# Patient Record
Sex: MALE | Race: WHITE | NOT HISPANIC OR LATINO | Employment: OTHER | URBAN - METROPOLITAN AREA
[De-identification: names, ages, dates, MRNs, and addresses within clinical notes are randomized per-mention and may not be internally consistent; named-entity substitution may affect disease eponyms.]

---

## 2017-01-05 ENCOUNTER — OFFICE VISIT (OUTPATIENT)
Dept: DERMATOLOGY | Facility: CLINIC | Age: 67
End: 2017-01-05

## 2017-01-05 DIAGNOSIS — B35.1 ONYCHOMYCOSIS: Primary | ICD-10-CM

## 2017-01-05 DIAGNOSIS — L73.8 SEBACEOUS HYPERPLASIA: ICD-10-CM

## 2017-01-05 ASSESSMENT — PAIN SCALES - GENERAL: PAINLEVEL: NO PAIN (0)

## 2017-01-05 NOTE — NURSING NOTE
Dermatology Rooming Note    Jefry Hatfield's goals for this visit include:   Chief Complaint   Patient presents with     Derm Problem     Patient comes to clinic today to check a spot on his face.     Ivy Gardner CMA

## 2017-01-05 NOTE — PROGRESS NOTES
[facility] Dermatology Note      Dermatology Problem List:  1. Sebaceous hyperplasia  2. Possible onychomycosis vs trauma, R 1st/2nd toenails, with likely ACD to topical tea aren oil vs triple abx    Encounter Date: Jan 5, 2017    CC:   Chief Complaint   Patient presents with     Derm Problem     Patient comes to clinic today to check a spot on his face.         History of Present Illness:  Mr. Jefry Hatfield is a 66 year old male who present for eval of raised non-tender bumps on his left cheek, present for over 1 year, not growing painful, or bleeding. He also reports thickening and yellowing of his R 1st and 2nd toenails, not improving with topical tea tree oil (rather, worsening with a new itchy rash on the treated toes x4 weeks).     Past Medical History:   Patient Active Problem List   Diagnosis     Vision changes     Senile nuclear sclerosis     Malignant myopia     Pseudophakia of both eyes - Both Eyes     Myopic degeneration     Anisometropia     Cervicalgia     Cervical radiculopathy     Past Medical History   Diagnosis Date     Posterior staphyloma      LE     Pseudophakia of left eye      Myopic degeneration      Cataract, right eye      Retinal detachment RE 1971,[id number]     BE     Anisometropia      High myopia      Past Surgical History   Procedure Laterality Date     Scleral buckle  1971     RE     Scleral buckle  1980     LE     Cataract iol, rt/lt Bilateral 11/14/12LE 7/1/14RE     BE     Phacoemulsification clear cornea with standard intraocular lens implant  7/1/2014     Procedure: PHACOEMULSIFICATION CLEAR CORNEA WITH STANDARD INTRAOCULAR LENS IMPLANT;  Surgeon: Milan Bernardo MD;  Location: [facility]     Yag capsulotomy od (right eye)  10/30/2014       Medications:  Current Outpatient Prescriptions   Medication Sig Dispense Refill     NORTRIPTYLINE HCL PO Take 125 mg by mouth daily       LORazepam (ATIVAN) 0.5 MG tablet Take 30 minutes prior toMRI, may repeat one  time as needed if not relaxed for MRI.  Do not operate a vehicle after taking this medication 2 tablet 0     CALCIUM-MAG-VIT C-VIT D PO Take by mouth 2 times daily       VITAMIN D, CHOLECALCIFEROL, PO Take by mouth daily       Omega-3 Fatty Acids (OMEGA-3 FISH OIL PO) Take by mouth daily       Saw Palmetto, Serenoa repens, (SAW PALMETTO EXTRACT PO) Take by mouth daily          No Known Allergies      Review of Systems:  General: No recent infections, fevers, chills, malaise, arthralgias, unexplained weight/appetite changes  Skin: No new/changing moles, nothing bleeding/nonhealing/painful/tender/rapidly-growing.  Lymphatic: No subcutaneous lumps/bumps.      Physical exam:  Vitals: There were no vitals taken for this visit.  GEN: Well-appearing male, no discomfort or distress, cooperative with the exam  SKIN: Focused exam of face, right foot  - L cheek: umbilicated 2mm yellow-white papules x4  -yellowing/thickening R first and 2nd toenails, no webspace maceration  - xerotic red thin crusted plaques on the dorsal first two toes  -No other lesions of concern on areas examined.     Impression/Plan:  1. Sebaceous hyperplasia   - Benign nature was discussed. No further intervention required at this time.     2. Possibly onychomycosis, R 1st and 2nd toenails. No webspace maceration  - Clipping for PAS  - if negative, will RX urea 40% cream. If positive, will discuss possible oral terbinafine      3. ACD, likely to tea tree oil vs triple abx oint  - Advise discontinuing and using a daily moisturizer    Follow-up: pending clipping result.      Discussed and examined with [facility] staff dermatologist Dr. Chiara Lux.    Arash Lakhani MD, TR  PGY-4, Dermatology      Staff Involved:  Resident(Arash Lakhani)/Staff(as above)    .I was present for key portions of the history and exam.  See resident note for pertinent history, exam, and treatment plan.  Chiara Lux MD

## 2017-01-05 NOTE — Clinical Note
1/5/2017       RE: Jefry Hatfield  [address] [address]     Dear Colleague,    Thank you for referring your patient, Jefry Hatfield, to the [facility] DERMATOLOGY at [facility]. Please see a copy of my visit note below.    [facility] Dermatology Note      Dermatology Problem List:  1. Sebaceous hyperplasia  2. Possible onychomycosis vs trauma, R 1st/2nd toenails, with likely ACD to topical tea aren oil vs triple abx    Encounter Date: Jan 5, 2017    CC:   Chief Complaint   Patient presents with     Derm Problem     Patient comes to clinic today to check a spot on his face.         History of Present Illness:  Mr. Jefry Hatfield is a 66 year old male who present for eval of raised non-tender bumps on his left cheek, present for over 1 year, not growing painful, or bleeding. He also reports thickening and yellowing of his R 1st and 2nd toenails, not improving with topical tea tree oil (rather, worsening with a new itchy rash on the treated toes x4 weeks).     Past Medical History:   Patient Active Problem List   Diagnosis     Vision changes     Senile nuclear sclerosis     Malignant myopia     Pseudophakia of both eyes - Both Eyes     Myopic degeneration     Anisometropia     Cervicalgia     Cervical radiculopathy     Past Medical History   Diagnosis Date     Posterior staphyloma      LE     Pseudophakia of left eye      Myopic degeneration      Cataract, right eye      Retinal detachment RE 1971,[id number]     BE     Anisometropia      High myopia      Past Surgical History   Procedure Laterality Date     Scleral buckle  1971     RE     Scleral buckle  1980     LE     Cataract iol, rt/lt Bilateral 11/14/12LE 7/1/14RE     BE     Phacoemulsification clear cornea with standard intraocular lens implant  7/1/2014     Procedure: PHACOEMULSIFICATION CLEAR CORNEA WITH STANDARD INTRAOCULAR LENS IMPLANT;  Surgeon: Milan Bernardo,  MD;  Location:  [facility]     Yag capsulotomy od (right eye)  10/30/2014       Medications:  Current Outpatient Prescriptions   Medication Sig Dispense Refill     NORTRIPTYLINE HCL PO Take 125 mg by mouth daily       LORazepam (ATIVAN) 0.5 MG tablet Take 30 minutes prior toMRI, may repeat one time as needed if not relaxed for MRI.  Do not operate a vehicle after taking this medication 2 tablet 0     CALCIUM-MAG-VIT C-VIT D PO Take by mouth 2 times daily       VITAMIN D, CHOLECALCIFEROL, PO Take by mouth daily       Omega-3 Fatty Acids (OMEGA-3 FISH OIL PO) Take by mouth daily       Saw Palmetto, Serenoa repens, (SAW PALMETTO EXTRACT PO) Take by mouth daily          No Known Allergies      Review of Systems:  General: No recent infections, fevers, chills, malaise, arthralgias, unexplained weight/appetite changes  Skin: No new/changing moles, nothing bleeding/nonhealing/painful/tender/rapidly-growing.  Lymphatic: No subcutaneous lumps/bumps.      Physical exam:  Vitals: There were no vitals taken for this visit.  GEN: Well-appearing male, no discomfort or distress, cooperative with the exam  SKIN: Focused exam of face, right foot  - L cheek: umbilicated 2mm yellow-white papules x4  -yellowing/thickening R first and 2nd toenails, no webspace maceration  - xerotic red thin crusted plaques on the dorsal first two toes  -No other lesions of concern on areas examined.     Impression/Plan:  1. Sebaceous hyperplasia   - Benign nature was discussed. No further intervention required at this time.     2. Possibly onychomycosis, R 1st and 2nd toenails. No webspace maceration  - Clipping for PAS  - if negative, will RX urea 40% cream. If positive, will discuss possible oral terbinafine      3. ACD, likely to tea tree oil vs triple abx oint  - Advise discontinuing and using a daily moisturizer    Follow-up: pending clipping result.      Discussed and examined with [facility] staff dermatologist Dr. Chiara Lux.    Arash Lakhani MD,  TR  PGY-4, Dermatology      Staff Involved:  Resident(Arash Lakhani)/Staff(as above)    .I was present for key portions of the history and exam.  See resident note for pertinent history, exam, and treatment plan.  Chiara Lux MD        Again, thank you for allowing me to participate in the care of your patient.      Sincerely,    Arash Lakhani MD

## 2017-01-05 NOTE — PATIENT INSTRUCTIONS
Go to the lab for a blood draw    If appropriate, start terbinafine 250 mg by mouth daily for 6 weeks.    Go to the lab for a blood draw 6 weeks after starting the medication; if normal, we will prescribe the last 6 weeks.    Side effects: taste changes, GI upset. Stop the medication and call the clinic if you develop severe fatigue or a new rash.    Follow-up: 6 months

## 2017-01-09 LAB — COPATH REPORT: NORMAL

## 2017-01-16 ENCOUNTER — TELEPHONE (OUTPATIENT)
Dept: DERMATOLOGY | Facility: CLINIC | Age: 67
End: 2017-01-16

## 2017-01-16 DIAGNOSIS — L60.3 NAIL DYSTROPHY: Primary | ICD-10-CM

## 2017-01-16 RX ORDER — UREA 40 %
CREAM (GRAM) TOPICAL
Qty: 28 G | Refills: 5 | Status: SHIPPED | OUTPATIENT
Start: 2017-01-16 | End: 2018-03-27

## 2017-01-16 NOTE — TELEPHONE ENCOUNTER
Reached Mr. Hatfield to discuss negative nail PAS. Will RX urea 40% cream nightly under bandage occlusion to help with file/nipper debridement. Will order 3-mo recall for reassessment.    Arash Lakhani MD, TR  PGY-4, Dermatology

## 2017-05-04 ENCOUNTER — OFFICE VISIT (OUTPATIENT)
Dept: OPHTHALMOLOGY | Facility: CLINIC | Age: 67
End: 2017-05-04
Attending: OPHTHALMOLOGY
Payer: COMMERCIAL

## 2017-05-04 DIAGNOSIS — H44.23 MYOPIC DEGENERATION, BILATERAL: Primary | ICD-10-CM

## 2017-05-04 PROCEDURE — 92134 CPTRZ OPH DX IMG PST SGM RTA: CPT | Mod: ZF | Performed by: OPHTHALMOLOGY

## 2017-05-04 PROCEDURE — 99212 OFFICE O/P EST SF 10 MIN: CPT | Mod: ZF

## 2017-05-04 ASSESSMENT — SLIT LAMP EXAM - LIDS
COMMENTS: NORMAL
COMMENTS: NORMAL

## 2017-05-04 ASSESSMENT — VISUAL ACUITY
OD_PH_CC: 20/125
OS_PH_CC: 20/100+1
OS_CC: 20/200
OD_CC: 20/150
CORRECTION_TYPE: CONTACTS
METHOD: SNELLEN - LINEAR

## 2017-05-04 ASSESSMENT — EXTERNAL EXAM - RIGHT EYE: OD_EXAM: NORMAL

## 2017-05-04 ASSESSMENT — CONF VISUAL FIELD
OD_INFERIOR_NASAL_RESTRICTION: 3
OS_INFERIOR_NASAL_RESTRICTION: 3
OD_INFERIOR_TEMPORAL_RESTRICTION: 3
OS_INFERIOR_TEMPORAL_RESTRICTION: 1
OD_SUPERIOR_TEMPORAL_RESTRICTION: 3
OS_SUPERIOR_TEMPORAL_RESTRICTION: 3

## 2017-05-04 ASSESSMENT — TONOMETRY
IOP_METHOD: TONOPEN
OS_IOP_MMHG: 16
OD_IOP_MMHG: 16

## 2017-05-04 ASSESSMENT — EXTERNAL EXAM - LEFT EYE: OS_EXAM: NORMAL

## 2017-05-04 NOTE — NURSING NOTE
Chief Complaints and History of Present Illnesses   Patient presents with     Follow Up For     Myopic degeneration, bilateral - Both Eyes stable     HPI    Affected eye(s):  Both   Symptoms:        Duration:  1 year   Frequency:  Constant       Do you have eye pain now?:  No      Comments:  Pt. States that he is doing well.  No change in VA BE.  No c/o comfort BE.  No flashes BE.  Occasional floaters BE.  Smiley CHURCH 8:19 AM May 4, 2017

## 2017-05-04 NOTE — MR AVS SNAPSHOT
After Visit Summary   5/4/2017    Jefry Hatfield    MRN: [id number]           Patient Information     Date Of Birth          1950        Visit Information        Provider Department      5/4/2017 8:15 AM Lisa Chang MD [facility]        Today's Diagnoses     Myopic degeneration, bilateral    -  1       Follow-ups after your visit        Your next 10 appointments already scheduled     May 16, 2017  8:00 AM CDT   New Patient Visit with Gabriela Saenz OD   Eye Clinic ([facility])    [facility] 9th Fl  Clinic [address]  [address] [address]   [phone]              Who to contact     Please call your clinic at [phone] to:    Ask questions about your health    Make or cancel appointments    Discuss your medicines    Learn about your test results    Speak to your doctor   If you have compliments or concerns about an experience at your clinic, or if you wish to file a complaint, please contact [facility] Patient Relations at [phone] or email us at [email or web address]         Additional Information About Your Visit        MyChart Information     [employer] gives you secure access to your electronic health record. If you see a primary care provider, you can also send messages to your care team and make appointments. If you have questions, please call your primary care clinic.  If you do not have a primary care provider, please call [phone] and they will assist you.      [employer] is an electronic gateway that provides easy, online access to your medical records. With [employer], you can request a clinic appointment, read your test results, renew a prescription or communicate with your care team.     To access your existing account, please contact your [facility] or call [phone] for assistance.        Care EveryWhere ID     This is your Care EveryWhere ID.  This could be used by other organizations to access your [place] medical records  [id number]         Blood Pressure from Last 3 Encounters:   08/18/15 131/89   07/01/14 109/82   02/20/13 127/90    Weight from Last 3 Encounters:   08/18/15 80.7 kg (178 lb)   08/06/15 80.7 kg (178 lb)   07/30/15 80.7 kg (178 lb)              We Performed the Following     OCT Retina Spectralis OU (both eyes)        Primary Care Provider    None       No address on file        Thank you!     Thank you for choosing EYE CLINIC  for your care. Our goal is always to provide you with excellent care. Hearing back from our patients is one way we can continue to improve our services. Please take a few minutes to complete the written survey that you may receive in the mail after your visit with us. Thank you!             Your Updated Medication List - Protect others around you: Learn how to safely use, store and throw away your medicines at www.disposemymeds.org.          This list is accurate as of: 5/4/17 10:49 AM.  Always use your most recent med list.                   Brand Name Dispense Instructions for use    CALCIUM-MAG-VIT C-VIT D PO      Take by mouth 2 times daily       LORazepam 0.5 MG tablet    ATIVAN    2 tablet    Take 30 minutes prior toMRI, may repeat one time as needed if not relaxed for MRI.  Do not operate a vehicle after taking this medication       NORTRIPTYLINE HCL PO      Take 125 mg by mouth daily       OMEGA-3 FISH OIL PO      Take by mouth daily       SAW PALMETTO EXTRACT PO      Take by mouth daily       Urea 40 % Crea     28 g    Apply a thin layer to affected toenails under bandage occlusion nightly as needed.       VITAMIN D (CHOLECALCIFEROL) PO      Take by mouth daily

## 2017-05-04 NOTE — PROGRESS NOTES
CC: follow up myopic degeneration, posterior staphylomas  HPI: Jefry Hatfield is a  66 year old year-old patient with history of myopic degeneration both eyes., He has been followed in the past by Dr. Gopi Anne. Patient here for fundus evaluation. Reports progressive decreased vision both eyes, no new flashes and floaters     OCULAR HISTORY  Retinal detachment  With scleral buckle both eyes  right eye 1971, [id number]  Cataract extraction/IOL right eye 7-1-14  Cataract extraction/IOL left eye 11-14-12  S/p yag pc right eye 10-30-14    Retinal Imaging:  OCT  5-4-17  RE: posterior staphyloma, retina atrophic changes. Thin choroid  LE: posterior staphyloma, retina atrophic changes. Thin choroid and trace Epiretinal membrane     Assessment & Plan:    1.  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 pseudophakia both eyes - observe    Plan:  - observe  Discussed with patient the use of low vision aids including orcam camera  Consider follow up with low vision - Dr. Kate Herrera  Follow up in 12 months, sooner as needed    ~~~~~~~~~~~~~~~~~~~~~~~~~~~~~~~~~~   Complete documentation of historical and exam elements from today's encounter can be found in the full encounter summary report (not reduplicated in this progress note).  I personally obtained the chief complaint(s) and history of present illness.  I confirmed and edited as necessary the review of systems, past medical/surgical history, family history, social history, and examination findings as documented by others; and I examined the patient myself.  I personally reviewed the relevant tests, images, and reports as documented above.  I formulated and edited as  necessary the assessment and plan and discussed the findings and management plan with the patient and family    Lisa Chang MD  .  Retina Service   Department of Ophthalmology and Visual Neurosciences   [facility]  Phone: [phone]   Fax: [phone]

## 2017-05-16 ENCOUNTER — OFFICE VISIT (OUTPATIENT)
Dept: OPTOMETRY | Facility: CLINIC | Age: 67
End: 2017-05-16

## 2017-05-16 DIAGNOSIS — H44.23 DEGENERATIVE MYOPIA, BILATERAL: Primary | ICD-10-CM

## 2017-05-16 DIAGNOSIS — Z96.1 PSEUDOPHAKIA: ICD-10-CM

## 2017-05-16 DIAGNOSIS — H52.203 ASTIGMATISM, BILATERAL: ICD-10-CM

## 2017-05-16 ASSESSMENT — REFRACTION_CURRENTRX
OS_DIAMETER: 14.9
OD_SPHERE: -12.00
OS_SPHERE: -1.50
OS_BASECURVE: 9.0
OD_BRAND: ONEFIT 2.0
OD_DIAMETER: 14.2
OD_DIAMETER: 14.9
OD_BASECURVE: 7.5
OS_BRAND: ONEFIT 2.0
OS_SPHERE: -3.50
OS_DIAMETER: 14.7
OD_BASECURVE: 9.0
OS_BASECURVE: 7.4
OD_SPHERE: -3.00

## 2017-05-16 ASSESSMENT — VISUAL ACUITY
OS_CC: 20/200
METHOD: SNELLEN - LINEAR
OD_CC: 20/200
CORRECTION_TYPE: CONTACTS

## 2017-05-16 ASSESSMENT — KERATOMETRY
OS_AXISANGLE_DEGREES: 49
OD_K1POWER_DIOPTERS: 43.10
OS_K2POWER_DIOPTERS: 43.89
OD_AXISANGLE_DEGREES: 116
METHOD_AUTO_MANUAL: TOPOGRAPHY
OD_AXISANGLE2_DEGREES: 26
OS_K1POWER_DIOPTERS: 42.94
OS_AXISANGLE2_DEGREES: 139
OD_K2POWER_DIOPTERS: 44.06

## 2017-05-16 ASSESSMENT — SLIT LAMP EXAM - LIDS
COMMENTS: NORMAL
COMMENTS: NORMAL

## 2017-05-16 ASSESSMENT — EXTERNAL EXAM - LEFT EYE: OS_EXAM: NORMAL

## 2017-05-16 ASSESSMENT — EXTERNAL EXAM - RIGHT EYE: OD_EXAM: NORMAL

## 2017-05-16 NOTE — PROGRESS NOTES
A/P  1.) Degenerative Myopia/Anisometropia OU  -Currently in soft lenses, interested in improving acuity  -Does note subjective increase in acuity with scleral lens trial today  -Given motivation and current level of fit it is reasonable to pursue scleral lens fitting  -High anisometropia s/p CE/IOL  -Reviewed findings with pt, he would like to attempt     RTC 2 weeks for CL dispense    Contact Lens Billing  V-Code:  - GP scleral  Final Contact Lens Rx      Brand Base Curve Diameter Sphere Lens Addl. Specs   Right Onefit 2.0 (TO BE ORDERED) 7.7 15.2 -13.00 x-tra limbal clearance, std edge Optimum Extra clear   Left Onefit 2.0 (TO BE ORDERED) 7.7 15.2 -3.50 xtra limbal clearance, std edge Optimum Extra blue            # of units: 2  Price per Unit: $250    This patient requires contact lenses that are medically necessary for either improvement in vision over spectacles, support of the ocular surface, or other therapeutic benefit. These are not cosmetic contact lenses.     Encounter Diagnoses   Name Primary?     Astigmatism, bilateral      Degenerative myopia, bilateral Yes     Pseudophakia

## 2017-05-16 NOTE — MR AVS SNAPSHOT
After Visit Summary   5/16/2017    Jefry Hatfield    MRN: [id number]           Patient Information     Date Of Birth          1950        Visit Information        Provider Department      5/16/2017 8:00 AM Gabriela Saenz, [facility]        Today's Diagnoses     Degenerative myopia, bilateral    -  1    Astigmatism, bilateral        Pseudophakia           Follow-ups after your visit        Who to contact     Please call your clinic at [phone] to:    Ask questions about your health    Make or cancel appointments    Discuss your medicines    Learn about your test results    Speak to your doctor   If you have compliments or concerns about an experience at your clinic, or if you wish to file a complaint, please contact [facility] Patient Relations at [phone] or email us at [email or web address]         Additional Information About Your Visit        MyChart Information     [employer] gives you secure access to your electronic health record. If you see a primary care provider, you can also send messages to your care team and make appointments. If you have questions, please call your primary care clinic.  If you do not have a primary care provider, please call [phone] and they will assist you.      "[employer] is an electronic gateway that provides easy, online access to your medical records. With "[employer] you can request a clinic appointment, read your test results, renew a prescription or communicate with your care team.     To access your existing account, please contact your [facility] or call [phone] for assistance.        Care EveryWhere ID     This is your Care EveryWhere ID. This could be used by other organizations to access your [place] medical records  [id number]         Blood Pressure from Last 3 Encounters:   08/18/15 131/89   07/01/14 109/82   02/20/13 127/90    Weight from Last 3  Encounters:   08/18/15 80.7 kg (178 lb)   08/06/15 80.7 kg (178 lb)   07/30/15 80.7 kg (178 lb)              We Performed the Following     Corneal Topography OU (both eyes)        Primary Care Provider    None       No address on file        Thank you!     Thank you for choosing EYE CLINIC  for your care. Our goal is always to provide you with excellent care. Hearing back from our patients is one way we can continue to improve our services. Please take a few minutes to complete the written survey that you may receive in the mail after your visit with us. Thank you!             Your Updated Medication List - Protect others around you: Learn how to safely use, store and throw away your medicines at www.disposemymeds.org.          This list is accurate as of: 5/16/17  4:09 PM.  Always use your most recent med list.                   Brand Name Dispense Instructions for use    CALCIUM-MAG-VIT C-VIT D PO      Take by mouth 2 times daily       LORazepam 0.5 MG tablet    ATIVAN    2 tablet    Take 30 minutes prior toMRI, may repeat one time as needed if not relaxed for MRI.  Do not operate a vehicle after taking this medication       NORTRIPTYLINE HCL PO      Take 125 mg by mouth daily       OMEGA-3 FISH OIL PO      Take by mouth daily       SAW PALMETTO EXTRACT PO      Take by mouth daily       Urea 40 % Crea     28 g    Apply a thin layer to affected toenails under bandage occlusion nightly as needed.       VITAMIN D (CHOLECALCIFEROL) PO      Take by mouth daily

## 2017-05-24 ENCOUNTER — OFFICE VISIT (OUTPATIENT)
Dept: OPTOMETRY | Facility: CLINIC | Age: 67
End: 2017-05-24

## 2017-05-24 DIAGNOSIS — H52.203 ASTIGMATISM, BILATERAL: ICD-10-CM

## 2017-05-24 DIAGNOSIS — H44.23 DEGENERATIVE MYOPIA, BILATERAL: Primary | ICD-10-CM

## 2017-05-24 DIAGNOSIS — Z96.1 PSEUDOPHAKIA: ICD-10-CM

## 2017-05-24 ASSESSMENT — REFRACTION_CURRENTRX
OD_DIAMETER: 15.2
OS_ADDL_SPECS: OPTIMUM EXTRA BLUE
OD_BASECURVE: 7.7
OD_BRAND: ONEFIT 2.0
OS_BRAND: ONEFIT 2.0
OS_SPHERE: -3.50
OD_ADDL_SPECS: OPTIMUM EXTRA CLEAR
OS_BASECURVE: 7.7
OS_DIAMETER: 15.2
OD_SPHERE: -13.00

## 2017-05-24 ASSESSMENT — VISUAL ACUITY
OD_CC: 20/150-
OS_CC: 20/150-
METHOD: SNELLEN - LINEAR
CORRECTION_TYPE: CONTACTS

## 2017-05-24 ASSESSMENT — SLIT LAMP EXAM - LIDS
COMMENTS: NORMAL
COMMENTS: NORMAL

## 2017-05-24 ASSESSMENT — EXTERNAL EXAM - LEFT EYE: OS_EXAM: NORMAL

## 2017-05-24 ASSESSMENT — EXTERNAL EXAM - RIGHT EYE: OD_EXAM: NORMAL

## 2017-05-24 NOTE — MR AVS SNAPSHOT
After Visit Summary   5/24/2017    Jefry Hatfield    MRN: [id number]           Patient Information     Date Of Birth          1950        Visit Information        Provider Department      5/24/2017 3:00 PM Gabriela Saenz, [facility]        Today's Diagnoses     Degenerative myopia, bilateral    -  1    Pseudophakia        Astigmatism, bilateral           Follow-ups after your visit        Who to contact     Please call your clinic at [phone] to:    Ask questions about your health    Make or cancel appointments    Discuss your medicines    Learn about your test results    Speak to your doctor   If you have compliments or concerns about an experience at your clinic, or if you wish to file a complaint, please contact [facility] Patient Relations at [phone] or email us at [email or web address]         Additional Information About Your Visit        MyChart Information     [employer] gives you secure access to your electronic health record. If you see a primary care provider, you can also send messages to your care team and make appointments. If you have questions, please call your primary care clinic.  If you do not have a primary care provider, please call [phone] and they will assist you.      [employer] is an electronic gateway that provides easy, online access to your medical records. With [employer], you can request a clinic appointment, read your test results, renew a prescription or communicate with your care team.     To access your existing account, please contact your [facility] or call [phone] for assistance.        Care EveryWhere ID     This is your Care EveryWhere ID. This could be used by other organizations to access your [place] medical records  [id number]         Blood Pressure from Last 3 Encounters:   08/18/15 131/89   07/01/14 109/82   02/20/13 127/90    Weight from Last 3  Encounters:   08/18/15 80.7 kg (178 lb)   08/06/15 80.7 kg (178 lb)   07/30/15 80.7 kg (178 lb)              Today, you had the following     No orders found for display       Primary Care Provider    None       No address on file        Thank you!     Thank you for choosing EYE CLINIC  for your care. Our goal is always to provide you with excellent care. Hearing back from our patients is one way we can continue to improve our services. Please take a few minutes to complete the written survey that you may receive in the mail after your visit with us. Thank you!             Your Updated Medication List - Protect others around you: Learn how to safely use, store and throw away your medicines at www.disposemymeds.org.          This list is accurate as of: 5/24/17 11:59 PM.  Always use your most recent med list.                   Brand Name Dispense Instructions for use    CALCIUM-MAG-VIT C-VIT D PO      Take by mouth 2 times daily       LORazepam 0.5 MG tablet    ATIVAN    2 tablet    Take 30 minutes prior toMRI, may repeat one time as needed if not relaxed for MRI.  Do not operate a vehicle after taking this medication       NORTRIPTYLINE HCL PO      Take 125 mg by mouth daily       OMEGA-3 FISH OIL PO      Take by mouth daily       SAW PALMETTO EXTRACT PO      Take by mouth daily       Urea 40 % Crea     28 g    Apply a thin layer to affected toenails under bandage occlusion nightly as needed.       VITAMIN D (CHOLECALCIFEROL) PO      Take by mouth daily

## 2017-05-25 NOTE — PROGRESS NOTES
A/P  1.) Degenerative Myopia/Anisometropia OU  -Currently in soft lenses, interested in improving acuity  -Good comfort/fit with scleral lenses today  -Successful I&R, reviewed CL care and hygiene with pt  -Dispensed lenses, pt will try for several week to compare performance and acuity over soft lenses    RTC 2-3 weeks for f/u

## 2017-08-30 ENCOUNTER — OFFICE VISIT (OUTPATIENT)
Dept: OPTOMETRY | Facility: CLINIC | Age: 67
End: 2017-08-30

## 2017-08-30 DIAGNOSIS — H44.23 DEGENERATIVE MYOPIA, BILATERAL: Primary | ICD-10-CM

## 2017-08-30 DIAGNOSIS — Z96.1 PSEUDOPHAKIA: ICD-10-CM

## 2017-08-30 ASSESSMENT — REFRACTION_CURRENTRX
OS_BASECURVE: 7.7
OD_BASECURVE: 7.7
OD_BRAND: ONEFIT 2.0
OD_SPHERE: -13.00
OS_BRAND: ONEFIT 2.0
OS_DIAMETER: 15.2
OS_ADDL_SPECS: OPTIMUM EXTRA BLUE
OD_DIAMETER: 15.2
OS_SPHERE: -3.50
OD_ADDL_SPECS: OPTIMUM EXTRA CLEAR

## 2017-08-30 ASSESSMENT — SLIT LAMP EXAM - LIDS
COMMENTS: NORMAL
COMMENTS: NORMAL

## 2017-08-30 ASSESSMENT — VISUAL ACUITY
METHOD: SNELLEN - LINEAR
OD_CC: 20/200
CORRECTION_TYPE: CONTACTS
OS_CC: 20/200

## 2017-08-30 ASSESSMENT — EXTERNAL EXAM - RIGHT EYE: OD_EXAM: NORMAL

## 2017-08-30 ASSESSMENT — EXTERNAL EXAM - LEFT EYE: OS_EXAM: NORMAL

## 2017-08-30 NOTE — MR AVS SNAPSHOT
After Visit Summary   8/30/2017    Jefry Hatfield    MRN: [id number]           Patient Information     Date Of Birth          1950        Visit Information        Provider Department      8/30/2017 3:00 PM Gabriela Saenz, [facility]        Today's Diagnoses     Degenerative myopia, bilateral    -  1    Pseudophakia           Follow-ups after your visit        Who to contact     Please call your clinic at [phone] to:    Ask questions about your health    Make or cancel appointments    Discuss your medicines    Learn about your test results    Speak to your doctor   If you have compliments or concerns about an experience at your clinic, or if you wish to file a complaint, please contact [facility] Patient Relations at [phone] or email us at [email or web address]         Additional Information About Your Visit        MyChart Information     Lumiert gives you secure access to your electronic health record. If you see a primary care provider, you can also send messages to your care team and make appointments. If you have questions, please call your primary care clinic.  If you do not have a primary care provider, please call [phone] and they will assist you.      gBox is an electronic gateway that provides easy, online access to your medical records. With gBox, you can request a clinic appointment, read your test results, renew a prescription or communicate with your care team.     To access your existing account, please contact your [facility] or call [phone] for assistance.        Care EveryWhere ID     This is your Care EveryWhere ID. This could be used by other organizations to access your [place] medical records  [id number]         Blood Pressure from Last 3 Encounters:   08/18/15 131/89   07/01/14 109/82   02/20/13 127/90    Weight from Last 3 Encounters:   08/18/15 80.7 kg (178  lb)   08/06/15 80.7 kg (178 lb)   07/30/15 80.7 kg (178 lb)              Today, you had the following     No orders found for display       Primary Care Provider    None       No address on file        Equal Access to Services     UET BOSTON : Megan arminda quiroz dhaval Desai, waaxda luqadaha, qaybta kaalmada adefrancisca, danika henrytila imani. So [facility] [phone].    ATENCIÓN: Si habla español, tiene a payan disposición servicios gratuitos de asistencia lingüística. Llame al [phone].    We comply with applicable federal civil rights laws and [place] laws. We do not discriminate on the basis of race, color, national origin, age, disability sex, sexual orientation or gender identity.            Thank you!     Thank you for choosing EYE CLINIC  for your care. Our goal is always to provide you with excellent care. Hearing back from our patients is one way we can continue to improve our services. Please take a few minutes to complete the written survey that you may receive in the mail after your visit with us. Thank you!             Your Updated Medication List - Protect others around you: Learn how to safely use, store and throw away your medicines at www.disposemymeds.org.          This list is accurate as of: 8/30/17 11:59 PM.  Always use your most recent med list.                   Brand Name Dispense Instructions for use Diagnosis    CALCIUM-MAG-VIT C-VIT D PO      Take by mouth 2 times daily        LORazepam 0.5 MG tablet    ATIVAN    2 tablet    Take 30 minutes prior toMRI, may repeat one time as needed if not relaxed for MRI.  Do not operate a vehicle after taking this medication    Cervicalgia       NORTRIPTYLINE HCL PO      Take 125 mg by mouth daily        OMEGA-3 FISH OIL PO      Take by mouth daily        SAW PALMETTO EXTRACT PO      Take by mouth daily        Urea 40 % Crea     28 g    Apply a thin layer to affected toenails under bandage occlusion nightly as needed.    Nail dystrophy        VITAMIN D (CHOLECALCIFEROL) PO      Take by mouth daily

## 2017-09-01 NOTE — PROGRESS NOTES
A/P  1.) Degenerative Myopia/Anisometropia OU  -Overall doing well in part-time scleral lens wear, alternates with soft lenses  -Did not return for f/u on initial lenses  -Several episode of lid swelling after wearing scleral lenses (happens quickly after lens instillation)  -Does not sound like tight lens syndrome, though the peripheral curves could be slightly looser  -Rec switching totally to PF  (Clearcare) for rigid lenses, use addipak instead of reusable Purilens bottle  -If continued episodes, I would recommend ordering new lenses with looser peripheries    Monitor as needed for worsening symptoms

## 2018-01-22 ENCOUNTER — OFFICE VISIT (OUTPATIENT)
Dept: OPTOMETRY | Facility: CLINIC | Age: 68
End: 2018-01-22
Payer: COMMERCIAL

## 2018-01-22 DIAGNOSIS — H44.23 DEGENERATIVE MYOPIA, BILATERAL: Primary | ICD-10-CM

## 2018-01-22 DIAGNOSIS — H52.31 ANISOMETROPIA: ICD-10-CM

## 2018-01-22 DIAGNOSIS — Z96.1 PSEUDOPHAKIA: ICD-10-CM

## 2018-01-22 ASSESSMENT — REFRACTION_CURRENTRX
OS_DIAMETER: 14.7
OS_SPHERE: -1.50
OS_DIAMETER: 14.1
OD_BASECURVE: 9.0
OS_BASECURVE: 9.0
OD_BASECURVE: 8.5
OD_DIAMETER: 14.2
OS_BASECURVE: 8.5
OS_BRAND: DAILIES TOTAL 1
OD_SPHERE: -11.00
OD_DIAMETER: 14.1
OD_BRAND: DAILIES TOTAL 1
OS_SPHERE: -1.50
OD_SPHERE: -12.00

## 2018-01-22 ASSESSMENT — VISUAL ACUITY
OS_CC: 20/150
CORRECTION_TYPE: CONTACTS
OD_CC: 20/300
METHOD: SNELLEN - LINEAR

## 2018-01-22 ASSESSMENT — EXTERNAL EXAM - RIGHT EYE: OD_EXAM: NORMAL

## 2018-01-22 ASSESSMENT — EXTERNAL EXAM - LEFT EYE: OS_EXAM: NORMAL

## 2018-01-22 ASSESSMENT — SLIT LAMP EXAM - LIDS
COMMENTS: NORMAL
COMMENTS: NORMAL

## 2018-01-22 NOTE — MR AVS SNAPSHOT
After Visit Summary   1/22/2018    Jefry Hatfield    MRN: [id number]           Patient Information     Date Of Birth          1950        Visit Information        Provider Department      1/22/2018 8:30 AM Gabriela Saenz, [facility]        Today's Diagnoses     Degenerative myopia, bilateral    -  1    Pseudophakia        Anisometropia           Follow-ups after your visit        Who to contact     Please call your clinic at [phone] to:    Ask questions about your health    Make or cancel appointments    Discuss your medicines    Learn about your test results    Speak to your doctor   If you have compliments or concerns about an experience at your clinic, or if you wish to file a complaint, please contact [facility] Patient Relations at [phone] or email us at [email or web address]         Additional Information About Your Visit        MyChart Information     Ovulinet gives you secure access to your electronic health record. If you see a primary care provider, you can also send messages to your care team and make appointments. If you have questions, please call your primary care clinic.  If you do not have a primary care provider, please call [phone] and they will assist you.      Sparkcloud is an electronic gateway that provides easy, online access to your medical records. With Sparkcloud, you can request a clinic appointment, read your test results, renew a prescription or communicate with your care team.     To access your existing account, please contact your [facility] or call [phone] for assistance.        Care EveryWhere ID     This is your Care EveryWhere ID. This could be used by other organizations to access your [place] medical records  [id number]         Blood Pressure from Last 3 Encounters:   08/18/15 131/89   07/01/14 109/82   02/20/13 127/90    Weight from Last 3 Encounters:    08/18/15 80.7 kg (178 lb)   08/06/15 80.7 kg (178 lb)   07/30/15 80.7 kg (178 lb)              Today, you had the following     No orders found for display       Primary Care Provider    None Specified       No primary provider on file.        Equal Access to Services     TREYTOBIAS LUDY : Hadii aad ku hadangelitokerri Sokristiali, waaxda luqadaha, qaybta kaalmada keelyvalentinoclari, danika hernandeschiloviolette igleisas . So [facility] [phone].    ATENCIÓN: Si habla español, tiene a payan disposición servicios gratuitos de asistencia lingüística. Llame al [phone].    We comply with applicable federal civil rights laws and [place] laws. We do not discriminate on the basis of race, color, national origin, age, disability, sex, sexual orientation, or gender identity.            Thank you!     Thank you for choosing [facility]  for your care. Our goal is always to provide you with excellent care. Hearing back from our patients is one way we can continue to improve our services. Please take a few minutes to complete the written survey that you may receive in the mail after your visit with us. Thank you!             Your Updated Medication List - Protect others around you: Learn how to safely use, store and throw away your medicines at www.disposemymeds.org.          This list is accurate as of: 1/22/18 11:59 PM.  Always use your most recent med list.                   Brand Name Dispense Instructions for use Diagnosis    CALCIUM-MAG-VIT C-VIT D PO      Take by mouth 2 times daily        LORazepam 0.5 MG tablet    ATIVAN    2 tablet    Take 30 minutes prior toMRI, may repeat one time as needed if not relaxed for MRI.  Do not operate a vehicle after taking this medication    Cervicalgia       NORTRIPTYLINE HCL PO      Take 125 mg by mouth daily        OMEGA-3 FISH OIL PO      Take by mouth daily        SAW PALMETTO EXTRACT PO      Take by mouth daily        Urea 40 % Crea     28 g    Apply a thin layer to affected toenails under bandage  occlusion nightly as needed.    Nail dystrophy       VITAMIN D (CHOLECALCIFEROL) PO      Take by mouth daily

## 2018-01-22 NOTE — PROGRESS NOTES
A/P  1.) Degenerative Myopia/Anisometropia OU  -Currently wearing [place] soft lenses on planned replacement schedule  -Rec switching to higher Dk, more frequent replacement given peripheral corneal vascularization  -Pt prefers to stay in -12.00 right eye  -Okay to try DT1, but will order Continentals as well    Contact Lens Billing  V-Code:  - Soft spherical  Final Contact Lens Rx      Brand Base Curve Diameter Sphere   Right [place] Clear 9.0 14.2 -12.00   Left [place] Clear 9.0 14.7 -1.50            # of units: 2  Price per Unit: $45    This patient requires contact lenses that are medically necessary for either improvement in vision over spectacles, support of the ocular surface, or other therapeutic benefit. These are not cosmetic contact lenses.     Encounter Diagnoses   Name Primary?     Degenerative myopia, bilateral Yes     Pseudophakia      Anisometropia

## 2018-02-06 ENCOUNTER — OFFICE VISIT (OUTPATIENT)
Dept: OPTOMETRY | Facility: CLINIC | Age: 68
End: 2018-02-06

## 2018-02-06 DIAGNOSIS — Z96.1 PSEUDOPHAKIA: ICD-10-CM

## 2018-02-06 DIAGNOSIS — H44.23 DEGENERATIVE MYOPIA, BILATERAL: Primary | ICD-10-CM

## 2018-02-06 DIAGNOSIS — H52.31 ANISOMETROPIA: ICD-10-CM

## 2018-02-06 NOTE — MR AVS SNAPSHOT
After Visit Summary   2/6/2018    Jefry Hatfield    MRN: [id number]           Patient Information     Date Of Birth          1950        Visit Information        Provider Department      2/6/2018 8:00 AM Gabriela Saenz, [facility]        Today's Diagnoses     Degenerative myopia, bilateral    -  1    Pseudophakia        Anisometropia           Follow-ups after your visit        Who to contact     Please call your clinic at [phone] to:    Ask questions about your health    Make or cancel appointments    Discuss your medicines    Learn about your test results    Speak to your doctor   If you have compliments or concerns about an experience at your clinic, or if you wish to file a complaint, please contact [facility] Patient Relations at [phone] or email us at [email or web address]         Additional Information About Your Visit        MyChart Information     Kumu Networkst gives you secure access to your electronic health record. If you see a primary care provider, you can also send messages to your care team and make appointments. If you have questions, please call your primary care clinic.  If you do not have a primary care provider, please call [phone] and they will assist you.      RunMyProcess is an electronic gateway that provides easy, online access to your medical records. With RunMyProcess, you can request a clinic appointment, read your test results, renew a prescription or communicate with your care team.     To access your existing account, please contact your [facility] or call [phone] for assistance.        Care EveryWhere ID     This is your Care EveryWhere ID. This could be used by other organizations to access your [place] medical records  [id number]         Blood Pressure from Last 3 Encounters:   08/18/15 131/89   07/01/14 109/82   02/20/13 127/90    Weight from Last 3 Encounters:    08/18/15 80.7 kg (178 lb)   08/06/15 80.7 kg (178 lb)   07/30/15 80.7 kg (178 lb)              Today, you had the following     No orders found for display       Primary Care Provider    None Specified       No primary provider on file.        Equal Access to Services     TREYTOBIAS LUDY : Hadii aad ku hadangelitokerri Sokristiali, waaxda luqadaha, qaybta kaalmada keelyvalentinoclari, danika hernandeschiloviolette iglesias . So [facility] [phone].    ATENCIÓN: Si habla español, tiene a payan disposición servicios gratuitos de asistencia lingüística. Llame al [phone].    We comply with applicable federal civil rights laws and [place] laws. We do not discriminate on the basis of race, color, national origin, age, disability, sex, sexual orientation, or gender identity.            Thank you!     Thank you for choosing [facility]  for your care. Our goal is always to provide you with excellent care. Hearing back from our patients is one way we can continue to improve our services. Please take a few minutes to complete the written survey that you may receive in the mail after your visit with us. Thank you!             Your Updated Medication List - Protect others around you: Learn how to safely use, store and throw away your medicines at www.disposemymeds.org.          This list is accurate as of 2/6/18  3:14 PM.  Always use your most recent med list.                   Brand Name Dispense Instructions for use Diagnosis    CALCIUM-MAG-VIT C-VIT D PO      Take by mouth 2 times daily        LORazepam 0.5 MG tablet    ATIVAN    2 tablet    Take 30 minutes prior toMRI, may repeat one time as needed if not relaxed for MRI.  Do not operate a vehicle after taking this medication    Cervicalgia       NORTRIPTYLINE HCL PO      Take 125 mg by mouth daily        OMEGA-3 FISH OIL PO      Take by mouth daily        SAW PALMETTO EXTRACT PO      Take by mouth daily        Urea 40 % Crea     28 g    Apply a thin layer to affected toenails under bandage  occlusion nightly as needed.    Nail dystrophy       VITAMIN D (CHOLECALCIFEROL) PO      Take by mouth daily

## 2018-02-06 NOTE — PROGRESS NOTES
No office visit. CL order only.  Pt liked dailies, would like extended trial of them.    Contact Lens Billing  V-Code:  - Soft spherical  Final Contact Lens Rx      Brand Base Curve Diameter Sphere Lens   Right Dailies Total 1 8.5 14.1 -11.00/-12.00 pt trialing both kirkpatrick   Left Dailies Total 1 8.5 14.1 -1.50          Final Contact Lens Rx #2      Brand Base Curve Diameter Sphere Lens   Right [place] Clear 9.0 14.2 -12.00    Left [place] Clear 9.0 14.7 -1.50             # of units:   2 30-packs right eye (one -11.00 power, one -12.00 power) @ $40 per 30-pack  1 90-pack left eye @ $95 per 90-pack    This patient requires contact lenses that are medically necessary for either improvement in vision over spectacles, support of the ocular surface, or other therapeutic benefit. These are not cosmetic contact lenses.     Encounter Diagnoses   Name Primary?     Degenerative myopia, bilateral Yes     Pseudophakia      Anisometropia         Date of last eye exam: 1/22/18

## 2018-03-25 ASSESSMENT — ENCOUNTER SYMPTOMS
BACK PAIN: 0
MUSCLE WEAKNESS: 1
JOINT SWELLING: 0
MYALGIAS: 1
MUSCLE CRAMPS: 1
STIFFNESS: 0
HEMATURIA: 0
ARTHRALGIAS: 1
FLANK PAIN: 0
DYSURIA: 0
DIFFICULTY URINATING: 1
NECK PAIN: 0

## 2018-03-27 ENCOUNTER — OFFICE VISIT (OUTPATIENT)
Dept: INTERNAL MEDICINE | Facility: CLINIC | Age: 68
End: 2018-03-27
Payer: COMMERCIAL

## 2018-03-27 VITALS
HEIGHT: 72 IN | HEART RATE: 68 BPM | DIASTOLIC BLOOD PRESSURE: 73 MMHG | WEIGHT: 174.1 LBS | SYSTOLIC BLOOD PRESSURE: 109 MMHG | BODY MASS INDEX: 23.58 KG/M2

## 2018-03-27 DIAGNOSIS — Z23 NEED FOR PROPHYLACTIC VACCINATION WITH TETANUS-DIPHTHERIA (TD): ICD-10-CM

## 2018-03-27 DIAGNOSIS — Z13.6 SCREENING FOR AAA (ABDOMINAL AORTIC ANEURYSM): ICD-10-CM

## 2018-03-27 DIAGNOSIS — Z12.11 SPECIAL SCREENING FOR MALIGNANT NEOPLASMS, COLON: ICD-10-CM

## 2018-03-27 DIAGNOSIS — Z11.59 NEED FOR HEPATITIS C SCREENING TEST: ICD-10-CM

## 2018-03-27 DIAGNOSIS — Z76.89 ENCOUNTER TO ESTABLISH CARE: ICD-10-CM

## 2018-03-27 DIAGNOSIS — Z00.00 ROUTINE GENERAL MEDICAL EXAMINATION AT A HEALTH CARE FACILITY: Primary | ICD-10-CM

## 2018-03-27 LAB
CHOLEST SERPL-MCNC: 184 MG/DL
GLUCOSE SERPL-MCNC: 100 MG/DL (ref 70–99)
HDLC SERPL-MCNC: 46 MG/DL
LDLC SERPL CALC-MCNC: 115 MG/DL
NONHDLC SERPL-MCNC: 138 MG/DL
PSA SERPL-ACNC: 0.87 UG/L (ref 0–4)
TRIGL SERPL-MCNC: 116 MG/DL

## 2018-03-27 ASSESSMENT — PAIN SCALES - GENERAL: PAINLEVEL: NO PAIN (0)

## 2018-03-27 ASSESSMENT — ACTIVITIES OF DAILY LIVING (ADL)
IN_THE_PAST_7_DAYS,_DID_YOU_NEED_HELP_FROM_OTHERS_TO_TAKE_CARE_OF_THINGS_SUCH_AS_LAUNDRY_AND_HOUSEKEEPING,_BANKING,_SHOPPING,_USING_THE_TELEPHONE,_FOOD_PREPARATION,_TRANSPORTATION,_OR_TAKING_YOUR_OWN_MEDICATIONS?: N
IN_THE_PAST_7_DAYS,_DID_YOU_NEED_HELP_FROM_OTHERS_TO_PERFORM_EVERYDAY_ACTIVITIES_SUCH_AS_EATING,_GETTING_DRESSED,_GROOMING,_BATHING,_WALKING,_OR_USING_THE_TOILET: N

## 2018-03-27 NOTE — PATIENT INSTRUCTIONS
[facility]: [phone]     [facility] Medication Refill Request Information:  * Please contact your pharmacy regarding ANY request for medication refills.  ** [facility] Prescription Fax = [phone]  * Please allow 3 business days for routine medication refills.  * Please allow 5 business days for controlled substance medication refills.     [facility] Test Result notification information:  *You will be notified with in 7-10 days of your appointment day regarding the results of your test.  If you are on MyChart you will be notified as soon as the provider has reviewed the results and signed off on them.      Colonoscopy [phone]

## 2018-03-27 NOTE — MR AVS SNAPSHOT
After Visit Summary   3/27/2018    Jefry Hatfield    MRN: [id number]           Patient Information     Date Of Birth          1950        Visit Information        Provider Department      3/27/2018 3:05 PM Daniela Palomo MD [facility]        Today's Diagnoses     Encounter to establish care    -  1    Need for hepatitis C screening test        Need for prophylactic vaccination with tetanus-diphtheria (TD)        Screening for AAA (abdominal aortic aneurysm)          Care Instructions    [facility]: [phone]     [facility] Medication Refill Request Information:  * Please contact your pharmacy regarding ANY request for medication refills.  ** [facility] Pres[facility] Fax = [phone]  * Please allow 3 business days for routine medication refills.  * Please allow 5 business days for controlled substance medication refills.     Primary Care Center Test Result notification information:  *You will be notified with in 7-10 days of your appointment day regarding the results of your test.  If you are on MyChart you will be notified as soon as the provider has reviewed the results and signed off on them.      Colonoscopy [phone]            Follow-ups after your visit        Additional Services     GASTROENTEROLOGY ADULT REF PROCEDURE ONLY       Last Lab Result: No results found for: CR  Body mass index is 23.5 kg/(m^2).     Needed:  No  Language:  English    Patient will be contacted to schedule procedure.     Please be aware that coverage of these services is subject to the terms and limitations of your health insurance plan.  Call member services at your health plan with any benefit or coverage questions.  Any procedures must be performed at a [facility] facility OR coordinated by your clinic's referral office.    Please bring the following with you to your appointment:    (1) Any X-Rays, CTs or MRIs which have been performed.  Contact the facility  where they were done to arrange for  prior to your scheduled appointment.    (2) List of current medications   (3) This referral request   (4) Any documents/labs given to you for this referral                  Your next 10 appointments already scheduled     Mar 27, 2018  5:45 PM CDT   LAB with  LAB   [facility] ([facility])    [address] [place] [address]  1st Floor  [facility] [id number] [phone]           Please do not eat 10-12 hours before your appointment if you are coming in fasting for labs on lipids, cholesterol, or glucose (sugar). This does not apply to pregnant women. Water, hot tea and black coffee (with nothing added) are okay. Do not drink other fluids, diet soda or chew gum.            Apr 02, 2018  3:00 PM CDT   (Arrive by 2:45 PM)   Return Visit with Milan Scruggs MD   [facility] ([facility])    [address] [place] [address]  [address] [facility] [id number] [phone]              Future tests that were ordered for you today     Open Future Orders        Priority Expected Expires Ordered    Hepatitis C Screen Reflex to HCV RNA Quant and Genotype Routine 3/27/2018 3/27/2019 3/27/2018    Lipid panel reflex to direct LDL Fasting Routine 3/27/2018 3/27/2019 3/27/2018    Glucose Routine 3/27/2018 3/27/2019 3/27/2018    Vitamin D Deficiency Routine 3/27/2018 3/27/2019 3/27/2018    PSA screen Routine 3/27/2018 3/27/2019 3/27/2018            Who to contact     Please call your clinic at [phone] to:    Ask questions about your health    Make or cancel appointments    Discuss your medicines    Learn about your test results    Speak to your doctor            Additional Information About Your Visit        MyChart Information     [employer] Therapeuticst gives you secure access to your electronic health record. If you see a primary care provider, you can also send messages to your care team and make appointments. If you have questions,  "please call your primary care clinic.  If you do not have a primary care provider, please call [phone] and they will assist you.      Gen110 is an electronic gateway that provides easy, online access to your medical records. With Gen110, you can request a clinic appointment, read your test results, renew a prescription or communicate with your care team.     To access your existing account, please contact your [facility] or call [phone] for assistance.        Care EveryWhere ID     This is your Care EveryWhere ID. This could be used by other organizations to access your [place] medical records  [id number]        Your Vitals Were     Pulse Height BMI (Body Mass Index)             68 1.833 m (6' 0.17\") 23.5 kg/m2          Blood Pressure from Last 3 Encounters:   03/27/18 109/73   08/18/15 131/89   07/01/14 109/82    Weight from Last 3 Encounters:   03/27/18 79 kg (174 lb 1.6 oz)   08/18/15 80.7 kg (178 lb)   08/06/15 80.7 kg (178 lb)              We Performed the Following     GASTROENTEROLOGY ADULT REF PROCEDURE ONLY     Pneumococcal vaccine 13 valent PCV13 IM (Prevnar) [17918]     TDAP ( BOOSTRIX AGES 10-64)          Today's Medication Changes          These changes are accurate as of 3/27/18  4:47 PM.  If you have any questions, ask your nurse or doctor.               Stop taking these medicines if you haven't already. Please contact your care team if you have questions.     LORazepam 0.5 MG tablet   Commonly known as:  ATIVAN   Stopped by:  Daniela Palomo MD           NORTRIPTYLINE HCL PO   Stopped by:  Daniela Palomo MD           Urea 40 % Crea   Stopped by:  Daniela Palomo MD                    Primary Care Provider Office Phone # Fax #    Daniela Palomo [phone]       [address] [address]        Equal Access to Services     UTE BOSTON AH: Megan Desai, oswaldo daly, surinder arreola " danika leechacorta gretario henryaaclarence ah. [facility] [phone].    ATENCIÓN: Si habla thu, tiene a payan disposición servicios gratuitos de asistencia lingüística. Gael al [phone].    We comply with applicable federal civil rights laws and [place] laws. We do not discriminate on the basis of race, color, national origin, age, disability, sex, sexual orientation, or gender identity.            Thank you!     Thank you for choosing [facility]  for your care. Our goal is always to provide you with excellent care. Hearing back from our patients is one way we can continue to improve our services. Please take a few minutes to complete the written survey that you may receive in the mail after your visit with us. Thank you!             Your Updated Medication List - Protect others around you: Learn how to safely use, store and throw away your medicines at www.disposemymeds.org.          This list is accurate as of 3/27/18  4:47 PM.  Always use your most recent med list.                   Brand Name Dispense Instructions for use Diagnosis    CALCIUM-MAG-VIT C-VIT D PO      Take by mouth 2 times daily        OMEGA-3 FISH OIL PO      Take by mouth daily        SAW PALMETTO EXTRACT PO      Take by mouth daily        VITAMIN D (CHOLECALCIFEROL) PO      Take by mouth daily

## 2018-03-27 NOTE — NURSING NOTE
Chief Complaint   Patient presents with     Establish Care     pt here to establish care     Physical     pt here for physical     Mignon Guevara CMA at 3:35 PM on 3/27/2018.

## 2018-03-27 NOTE — PROGRESS NOTES
"  PRIMARY CARE CENTER         HPI:       Jefry Hatfield is a 67 year old male who presents for the following  Patient presents with: Establish Care (pt here to establish care) and Physical (pt here for physical)    No acute complains. Patient has a history of cerivcal radiculopathy and follows with sports medicine. 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     He is pretty active and exercises regularly. Swims for half an hour a week.  He also does [employer] and rowing for 45 minutes twice a week. He also walks 2 miles daily to and from work. He follows a healthy diet. He is , lives with wife and both are planning to retire next year.         Problem, Medication and Allergy Lists were reviewed and are current.  Patient is a new patient to this clinic and so  I reviewed/updated the Past Medical History, the Family History and the Social History.          Review of Systems:   ROS  I have personally reviewed and updated the complete ROS on the day of the visit.      Positive for decreased urine stream        Physical Exam:   /73  Pulse 68  Ht 1.833 m (6' 0.17\")  Wt 79 kg (174 lb 1.6 oz)  BMI 23.5 kg/m2  Body mass index is 23.5 kg/(m^2).  Vitals were reviewed       GENERAL APPEARANCE: healthy, alert and no distress     EYES: EOMI,  PERRL     HENT: ear canals and TM's normal and nose and mouth without ulcers or lesions     NECK: No adenopathy, no asymmetry, masses, or scars and thyroid normal to palpation     RESP: lungs clear to auscultation - no rales, rhonchi or wheezes     CV: regular rates and rhythm, normal S1 S2, no S3 or S4 and no murmur, click or rub     ABDOMEN:  soft, nontender, no HSM or masses and bowel sounds normal     Rectal exam: prostate symmetric w/o nodularity, no masses palpated     MS: extremities normal- no gross deformities noted, no evidence of inflammation in joints, FROM in all extremities.     SKIN: no suspicious " lesions or rashes     NEURO: AOx3, moves all extremities      PSYCH: mentation appears normal. and affect normal/bright     LYMPHATICS: No cervical adenopathy        Results:     Assessment and Plan     Jefry was seen today for establish care and physical.    Diagnoses and all orders for this visit:    Encounter to establish care  -     Lipid panel reflex to direct LDL Fasting; Future  -     GASTROENTEROLOGY ADULT REF PROCEDURE ONLY  -     Pneumococcal vaccine 13 valent PCV13 IM (Prevnar) [07309]  -     Glucose; Future  -     Vitamin D Deficiency; Future  -     Hepatitis C Screen Reflex to HCV RNA Quant and Genotype; Future  -     TDAP ( BOOSTRIX AGES 10-64)    Decreased urine stream  For the past year. No other urinary symptoms. Rectal exam shows a normal size prostate with no nodularity. His symptoms is likely 2/2 to mild BPH. Not causing him any significant distress. Discussed with patient the utility of PSA check and false positives.    -     PSA screen; Future    RTC in 6 months    Options for treatment and follow-up care were reviewed with the patient. Jefry Hatfield engaged in the decision making process and verbalized understanding of the options discussed and agreed with the final plan.    Daniela Palomo MD  [facility]  [phone]  Mar 27, 2018    Pt was seen and plan of care discussed with Dr. Christina.     Attending Addendum:  Patient seen and examined with resident in clinic today.  Pertinent portions of history and exam were independently verified by myself.  I agree with the exam and plan as outlined above with the following modifications: none.  Kate Christina MD  Internal Medicine

## 2018-03-28 ENCOUNTER — TELEPHONE (OUTPATIENT)
Dept: GASTROENTEROLOGY | Facility: CLINIC | Age: 68
End: 2018-03-28

## 2018-03-28 LAB
DEPRECATED CALCIDIOL+CALCIFEROL SERPL-MC: 76 UG/L (ref 20–75)
HCV AB SERPL QL IA: NONREACTIVE

## 2018-04-02 ENCOUNTER — OFFICE VISIT (OUTPATIENT)
Dept: ORTHOPEDICS | Facility: CLINIC | Age: 68
End: 2018-04-02
Payer: COMMERCIAL

## 2018-04-02 VITALS — WEIGHT: 174 LBS | HEIGHT: 72 IN | RESPIRATION RATE: 16 BRPM | BODY MASS INDEX: 23.57 KG/M2

## 2018-04-02 DIAGNOSIS — M67.911 TENDINOPATHY OF ROTATOR CUFF, RIGHT: Primary | ICD-10-CM

## 2018-04-02 NOTE — MR AVS SNAPSHOT
After Visit Summary   4/2/2018    Jefry Hatfield    MRN: [id number]           Patient Information     Date Of Birth          1950        Visit Information        Provider Department      4/2/2018 3:00 PM Milan Scruggs MD [facility] Sports Medicine        Today's Diagnoses     Tendinopathy of rotator cuff, right    -  1       Follow-ups after your visit        Your next 10 appointments already scheduled     Apr 19, 2018  4:30 PM CDT   (Arrive by 4:15 PM)   [facility] Extremity with Blake Murray PT   [facility] Physical Therapy [facility] ([facility])    [address] [place] [address] [phone]            Apr 26, 2018  5:10 PM CDT   AMADOU Extremity with Blake Murray PT   [facility] ([facility])    [address] [place] [address] [phone]            May 25, 2018   Procedure with Zak Mcclelland MD   [facility] ([facility])    [address] [place] [address] [phone]           Located in the Clinics and Surgery Center at [address] [place] [address].   parking is very convenient and highly recommended.  is a $6 flat rate fee.  Both  and self parkers should enter the main arrival plaza from [facility]; parking attendants will direct you based on your parking preference.              Who to contact     Please call your clinic at [phone] to:    Ask questions about your health    Make or cancel appointments    Discuss your medicines    Learn about your test results    Speak to your doctor            Additional Information About Your Visit        MyChart Information     [employer] gives you secure access to your electronic health record. If you see a primary care provider, you can also send messages to your care team and make  "appointments. If you have questions, please call your primary care clinic.  If you do not have a primary care provider, please call [phone] and they will assist you.      [employer] is an electronic gateway that provides easy, online access to your medical records. With [employer], you can request a clinic appointment, read your test results, renew a prescription or communicate with your care team.     To access your existing account, please contact your [facility] or call [phone] for assistance.        Care EveryWhere ID     This is your Care EveryWhere ID. This could be used by other organizations to access your [place] medical records  [id number]        Your Vitals Were     Respirations Height BMI (Body Mass Index)             16 6' 0.17\" (1.833 m) 23.49 kg/m2          Blood Pressure from Last 3 Encounters:   03/27/18 109/73   08/18/15 131/89   07/01/14 109/82    Weight from Last 3 Encounters:   04/02/18 174 lb (78.9 kg)   03/27/18 174 lb 1.6 oz (79 kg)   08/18/15 178 lb (80.7 kg)              Today, you had the following     No orders found for display       Primary Care Provider Office Phone # Fax #    Daniela Palomo [phone]       [place] [address]        Equal Access to Services     [place] : Hadii arminda quiroz hadangelitoo Sodenise, waaxda luqadaha, qaybta kaalmada adechacortayada, danika diallo. So [facility] [phone].    ATENCIÓN: Si habla español, tiene a payan disposición servicios gratuitos de asistencia lingüística. Llame al [phone].    We comply with applicable federal civil rights laws and [place] laws. We do not discriminate on the basis of race, color, national origin, age, disability, sex, sexual orientation, or gender identity.            Thank you!     Thank you for choosing [facility]  for your care. Our goal is always to provide you with excellent care. Hearing back from " our patients is one way we can continue to improve our services. Please take a few minutes to complete the written survey that you may receive in the mail after your visit with us. Thank you!             Your Updated Medication List - Protect others around you: Learn how to safely use, store and throw away your medicines at www.disposemymeds.org.          This list is accurate as of 4/2/18 11:59 PM.  Always use your most recent med list.                   Brand Name Dispense Instructions for use Diagnosis    CALCIUM-MAG-VIT C-VIT D PO      Take by mouth 2 times daily        OMEGA-3 FISH OIL PO      Take by mouth daily        SAW PALMETTO EXTRACT PO      Take by mouth daily        VITAMIN D (CHOLECALCIFEROL) PO      Take by mouth daily

## 2018-04-02 NOTE — LETTER
4/2/2018      RE: Jefry Hatfield  [address] [address]        Subjective:   Jefry Hatfield is a 67 year old male who presents with right shoulder pain. He swims, rows and runs a couple times a week. He is right handed. 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      Background:   Date of injury: NA   Duration of symptoms: 3 months  Mechanism of Injury: Insidious Onset; Unknown   Aggravating factors: sleeping on right shoulder, reaching,crawling with swimming sometimes will irritate his shoulder  Relieving Factors: rest  Prior Evaluation: Chiropractor, PT for cervical     PAST MEDICAL, SOCIAL, SURGICAL AND FAMILY HISTORY: He  has a past medical history of Anisometropia; Cataract, right eye; High myopia; Myopic degeneration; Posterior staphyloma; Pseudophakia of left eye; and Retinal detachment (RE 1971,IS2128). He also has no past medical history of Diabetes (H) or Diabetic retinopathy (H).  He  has a past surgical history that includes Scleral buckle (1971); Scleral buckle (1980); cataract iol, rt/lt (Bilateral, 11/14/12LE 7/1/14RE); Phacoemulsification clear cornea with standard intraocular lens implant (7/1/2014); YAG Capsulotomy OD (right eye) (10/30/2014); and appendectomy.  His family history includes Asthma in his father; DIABETES in his paternal grandfather; Dementia in his mother; Heart Failure in his father. There is no history of CANCER, Glaucoma, or Macular Degeneration.  He reports that he has never smoked. He has never used smokeless tobacco. He reports that he drinks alcohol. He reports that he does not use illicit drugs.    ALLERGIES: He is allergic to ranitidine.    CURRENT MEDICATIONS: He has a current medication list which includes the following prescription(s): calcium-mag-vit c-vit d, cholecalciferol, omega-3 fatty acids, and saw palmetto (serenoa repens).     REVIEW OF SYSTEMS: 3 point review of systems is negative except as noted above.     Exam:   Resp 16  Ht 6'  "0.17\" (1.833 m)  Wt 174 lb (78.9 kg)  BMI 23.49 kg/m2     CONSTITUTIONAL: healthy, alert and no distress  HEAD: Normocephalic. No masses, lesions, tenderness or abnormalities  SKIN: no suspicious lesions or rashes  GAIT: normal  NEUROLOGIC: Non-focal  PSYCHIATRIC: affect normal/bright and mentation appears normal.    MUSCULOSKELETAL:   Right Shoulder  Inspection no atrophy, but kyphotic posture with ER of both scapulae  Palpation tender lateral shoulder, nontender biceps or ac joint. nontender cervical spine.    AROM  abduction 170 ER 40 IR L1  PROM full    Strength:   FF 5/5 + pain  abduction 5/5+ pain   ER at 0 deg 5/5 +pain   ER at 90 deg 5/5- pain  (-) belly press    (+) Neer's, (+) James (-) modified James  (-) ac joint tenderness (-) crossover test  (-) [place]'s (-) crank test  (-) Yergason's (-) Speeds  (-) sulcus sign (-) increased translation with load and shift (-) apprehension (-) relocation test       Assessment/Plan:   R cuff tendinopathy and impingment  Hx Cervical DDD  --hx of myopia which affects his biomechanics. Goal of returning to swimming with less pain. We discussed it is reassuring this improves with swimming. He has a screen mount that he will use more consistently for better biomechanics at work.  --we discussed good periscapular strengthening and some cuff strengthening. He will set apt with Robin Taylor for this 1-2 apt and recheck as needed.  --we did review differences in symptoms between shoulder and neck pain.  --if symptoms not improving in 4-6 weeks f/u.      Milan Scruggs MD CAQ        "

## 2018-04-02 NOTE — PROGRESS NOTES
" Subjective:   Jefry Hatfield is a 67 year old male who presents with right shoulder pain. He swims, rows and runs a couple times a week. He is right handed. 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      Background:   Date of injury: NA   Duration of symptoms: 3 months  Mechanism of Injury: Insidious Onset; Unknown   Aggravating factors: sleeping on right shoulder, reaching,crawling with swimming sometimes will irritate his shoulder  Relieving Factors: rest  Prior Evaluation: Chiropractor, PT for cervical     PAST MEDICAL, SOCIAL, SURGICAL AND FAMILY HISTORY: He  has a past medical history of Anisometropia; Cataract, right eye; High myopia; Myopic degeneration; Posterior staphyloma; Pseudophakia of left eye; and Retinal detachment (RE 1971,[id number]). He also has no past medical history of Diabetes (H) or Diabetic retinopathy (H).  He  has a past surgical history that includes Scleral buckle (1971); Scleral buckle (1980); cataract iol, rt/lt (Bilateral, 11/14/12LE 7/1/14RE); Phacoemulsification clear cornea with standard intraocular lens implant (7/1/2014); YAG Capsulotomy OD (right eye) (10/30/2014); and appendectomy.  His family history includes Asthma in his father; DIABETES in his paternal grandfather; Dementia in his mother; Heart Failure in his father. There is no history of CANCER, Glaucoma, or Macular Degeneration.  He reports that he has never smoked. He has never used smokeless tobacco. He reports that he drinks alcohol. He reports that he does not use illicit drugs.    ALLERGIES: He is allergic to ranitidine.    CURRENT MEDICATIONS: He has a current medication list which includes the following prescription(s): calcium-mag-vit c-vit d, cholecalciferol, omega-3 fatty acids, and saw palmetto (serenoa repens).     REVIEW OF SYSTEMS: 3 point review of systems is negative except as noted above.     Exam:   Resp 16  Ht 6' 0.17\" (1.833 m)  Wt 174 lb (78.9 kg)  BMI 23.49 kg/m2     CONSTITUTIONAL: healthy, alert " and no distress  HEAD: Normocephalic. No masses, lesions, tenderness or abnormalities  SKIN: no suspicious lesions or rashes  GAIT: normal  NEUROLOGIC: Non-focal  PSYCHIATRIC: affect normal/bright and mentation appears normal.    MUSCULOSKELETAL:   Right Shoulder  Inspection no atrophy, but kyphotic posture with ER of both scapulae  Palpation tender lateral shoulder, nontender biceps or ac joint. nontender cervical spine.    AROM  abduction 170 ER 40 IR L1  PROM full    Strength:   FF 5/5 + pain  abduction 5/5+ pain   ER at 0 deg 5/5 +pain   ER at 90 deg 5/5- pain  (-) belly press    (+) Neer's, (+) James (-) modified James  (-) ac joint tenderness (-) crossover test  (-) [place]'s (-) crank test  (-) Yergason's (-) Speeds  (-) sulcus sign (-) increased translation with load and shift (-) apprehension (-) relocation test       Assessment/Plan:   R cuff tendinopathy and impingment  Hx Cervical DDD  --hx of myopia which affects his biomechanics. Goal of returning to swimming with less pain. We discussed it is reassuring this improves with swimming. He has a screen mount that he will use more consistently for better biomechanics at work.  --we discussed good periscapular strengthening and some cuff strengthening. He will set apt with Robin Taylor for this 1-2 apt and recheck as needed.  --we did review differences in symptoms between shoulder and neck pain.  --if symptoms not improving in 4-6 weeks f/u.      Milan Scruggs MD CAQ

## 2018-04-19 ENCOUNTER — THERAPY VISIT (OUTPATIENT)
Dept: PHYSICAL THERAPY | Facility: CLINIC | Age: 68
End: 2018-04-19
Payer: COMMERCIAL

## 2018-04-19 DIAGNOSIS — M25.511 SHOULDER PAIN, RIGHT: Primary | ICD-10-CM

## 2018-04-19 PROCEDURE — 97161 PT EVAL LOW COMPLEX 20 MIN: CPT | Mod: GP | Performed by: PHYSICAL THERAPIST

## 2018-04-19 PROCEDURE — 97110 THERAPEUTIC EXERCISES: CPT | Mod: GP | Performed by: PHYSICAL THERAPIST

## 2018-04-19 PROCEDURE — 97112 NEUROMUSCULAR REEDUCATION: CPT | Mod: GP | Performed by: PHYSICAL THERAPIST

## 2018-04-19 NOTE — MR AVS SNAPSHOT
After Visit Summary   4/19/2018    Jefry Hatfield    MRN: [id number]           Patient Information     Date Of Birth          1950        Visit Information        Provider Department      4/19/2018 4:30 PM Blake Murray PT M [facility] Therapy [facility]        Today's Diagnoses     Shoulder pain, right    -  1       Follow-ups after your visit        Your next 10 appointments already scheduled     May 17, 2018 12:00 PM CDT   [facility] Extremity with CHRIS Douglas [facility] ([facility])    [address] [place] [address] [phone]            May 25, 2018   Procedure with Zak Mcclelland MD   [facility] ([facility])    [address] [place] [address] [phone]           Located in the Clinics and Surgery Center at [address] [place] [address].   parking is very convenient and highly recommended.  is a $6 flat rate fee.  Both  and self parkers should enter the main arrival plaza from [facility]; parking attendants will direct you based on your parking preference.              Who to contact     If you have questions or need follow up information about today's clinic visit or your schedule please contact [facility] PHYSICAL THERAPY [facility] directly at [phone].  Normal or non-critical lab and imaging results will be communicated to you by MyChart, letter or phone within 4 business days after the clinic has received the results. If you do not hear from us within 7 days, please contact the clinic through MyChart or phone. If you have a critical or abnormal lab result, we will notify you by phone as soon as possible.  Submit refill requests through [employer] or call your pharmacy and they will forward the refill request to us. Please allow 3 business days for your refill to be completed.           Additional Information About Your Visit        HAM-IThart Information     [employer] gives you secure access to your electronic health record. If you see a primary care provider, you can also send messages to your care team and make appointments. If you have questions, please call your primary care clinic.  If you do not have a primary care provider, please call [phone] and they will assist you.        Care EveryWhere ID     This is your Care EveryWhere ID. This could be used by other organizations to access your [place] medical records  [id number]         Blood Pressure from Last 3 Encounters:   03/27/18 109/73   08/18/15 131/89   07/01/14 109/82    Weight from Last 3 Encounters:   04/02/18 78.9 kg (174 lb)   03/27/18 79 kg (174 lb 1.6 oz)   08/18/15 80.7 kg (178 lb)              We Performed the Following     HC PT EVAL, LOW COMPLEXITY     AMADOU INITIAL EVAL REPORT     NEUROMUSCULAR RE-EDUCATION     THERAPEUTIC EXERCISES        Primary Care Provider Office Phone # Fax #    Daniela Palomo [phone]       [place] [address]        Equal Access to Services     [facility] Center[facility] : Hadii arminda ku hadbrandin Sodenise, waaxda lumaritzaadaha, qaybta kaalmada jesus, danika iglesias . So [facility] [phone].    ATENCIÓN: Si habla español, tiene a payan disposición servicios gratuitos de asistencia lingüística. LlLake [facility] [phone].    We comply with applicable federal civil rights laws and [place] laws. We do not discriminate on the basis of race, color, national origin, age, disability, sex, sexual orientation, or gender identity.            Thank you!     Thank you for choosing [facility] PHYSICAL THERAPY [facility]  for your care. Our goal is always to provide you with excellent care. Hearing back from our patients is one way we can continue to improve our services. Please take a few minutes to complete the written survey that you may receive in the mail after  your visit with us. Thank you!             Your Updated Medication List - Protect others around you: Learn how to safely use, store and throw away your medicines at www.disposemymeds.org.          This list is accurate as of 4/19/18 11:59 PM.  Always use your most recent med list.                   Brand Name Dispense Instructions for use Diagnosis    CALCIUM-MAG-VIT C-VIT D PO      Take by mouth 2 times daily        OMEGA-3 FISH OIL PO      Take by mouth daily        SAW PALMETTO EXTRACT PO      Take by mouth daily        VITAMIN D (CHOLECALCIFEROL) PO      Take by mouth daily

## 2018-04-19 NOTE — PROGRESS NOTES
[facility] Initial Evaluation  Subjective:  Patient is a 67 year old male presenting with rehab right shoulder hpi.   Jefry Hatfield is a 67 year old male with a right shoulder condition.  Condition occurred with:  Unknown cause.  Condition occurred: for unknown reasons.  This is a new condition  February 2018.    Patient reports pain:  Lateral.    Pain is described as aching and is intermittent   Associated symptoms:  Painful arc.   Symptoms are exacerbated by using arm behind back, using arm overhead, using arm at shoulder level, lying on extremity and certain positions and relieved by rest and activity/movement.      Previous treatment includes chiropractic and physical therapy.  There was mild improvement following previous treatment.  General health as reported by patient is good.    Medical allergies: no.  Other surgeries include:  None reported.  Current medications:  None as reported by the patient.  Current occupation is Works at the [employer].  Patient is working in normal job without restrictions.  Primary job tasks include:  Prolonged sitting.    Barriers include:  None as reported by the patient.    Red flags:  None as reported by the patient.                        Objective:  Standing Alignment:    Cervical/Thoracic:  Forward head and thoracic kyphosis increased  Shoulder/UE:  Rounded shoulders and scapular winging R  Lumbar:  Lordosis decr and posterior pelvic tilt            Gait:    Gait Type:  Normal                         Cervical/Thoracic Evaluation  Cervical AROM: normal         Headaches: none                         Shoulder Evaluation:  ROM:  AROM:  normal                                  Strength:  normal  Flexion: Right: 5-/5     Pain:           External Rotation:   Right:4+/5     Pain:            Stability Testing:  normal      Special Tests:      Right shoulder positive for the following special tests:Impingement  Right shoulder negative for the following  special tests:Labral and Rotator cuff tear  Palpation:  normal                                             General Evaluation:  AROM:          Upper Extremity:  Significant findings:  Scapular Dyskinesis, Upper Trap Dominant                                                                 Patient seen Chiropractor was given multiple stretching exercises.     ROS    Assessment/Plan:    Patient is a 67 year old male with right side shoulder complaints.    Patient has the following significant findings with corresponding treatment plan.                Diagnosis 1:  Right Shoulder Pain  Pain -  hot/cold therapy, US, electric stimulation, manual therapy, education and home program  Decreased strength - therapeutic exercise, therapeutic activities and home program  Decreased proprioception - neuro re-education, therapeutic activities and home program  Impaired muscle performance - neuro re-education and home program  Decreased function - therapeutic activities, home program and functional performance testing    Therapy Evaluation Codes:   1) History comprised of:   Personal factors that impact the plan of care:      Age and poor vision.    Comorbidity factors that impact the plan of care are:      None.     Medications impacting care: None.  2) Examination of Body Systems comprised of:   Body structures and functions that impact the plan of care:      Shoulder.   Activity limitations that impact the plan of care are:      Grasping, Lifting, Working, Sleeping and Reaching.  3) Clinical presentation characteristics are:   Stable/Uncomplicated.  4) Decision-Making    Low complexity using standardized patient assessment instrument and/or measureable assessment of functional outcome.  Cumulative Therapy Evaluation is: Low complexity.    Previous and current functional limitations:  (See Goal Flow Sheet for this information)    Short term and Long term goals: (See Goal Flow Sheet for this information)     Communication ability:   Patient appears to be able to clearly communicate and understand verbal and written communication and follow directions correctly.  Treatment Explanation - The following has been discussed with the patient:   RX ordered/plan of care  Anticipated outcomes  Possible risks and side effects  This patient would benefit from PT intervention to resume normal activities.   Rehab potential is good.    Frequency:  2 X a month, once daily  Duration:  for 2 months  Discharge Plan:  Achieve all LTG.  Independent in home treatment program.  Reach maximal therapeutic benefit.    Please refer to the daily flowsheet for treatment today, total treatment time and time spent performing 1:1 timed codes.

## 2018-04-26 ENCOUNTER — THERAPY VISIT (OUTPATIENT)
Dept: PHYSICAL THERAPY | Facility: CLINIC | Age: 68
End: 2018-04-26
Payer: COMMERCIAL

## 2018-04-26 DIAGNOSIS — M25.511 SHOULDER PAIN, RIGHT: ICD-10-CM

## 2018-04-26 PROCEDURE — 97110 THERAPEUTIC EXERCISES: CPT | Mod: GP | Performed by: PHYSICAL THERAPIST

## 2018-04-26 PROCEDURE — 97112 NEUROMUSCULAR REEDUCATION: CPT | Mod: GP | Performed by: PHYSICAL THERAPIST

## 2018-04-28 ENCOUNTER — HOSPITAL ENCOUNTER (EMERGENCY)
Facility: CLINIC | Age: 68
Discharge: HOME | End: 2018-04-29
Attending: EMERGENCY MEDICINE | Admitting: EMERGENCY MEDICINE
Payer: COMMERCIAL

## 2018-04-28 ENCOUNTER — NURSE TRIAGE (OUTPATIENT)
Dept: NURSING | Facility: CLINIC | Age: 68
End: 2018-04-28

## 2018-04-28 DIAGNOSIS — N20.0 NEPHROLITHIASIS: ICD-10-CM

## 2018-04-28 DIAGNOSIS — N13.2 HYDRONEPHROSIS WITH RENAL AND URETERAL CALCULUS OBSTRUCTION: ICD-10-CM

## 2018-04-28 PROCEDURE — 99285 EMERGENCY DEPT VISIT HI MDM: CPT | Mod: 25

## 2018-04-28 PROCEDURE — 99285 EMERGENCY DEPT VISIT HI MDM: CPT | Mod: Z6 | Performed by: EMERGENCY MEDICINE

## 2018-04-28 NOTE — ED AVS SNAPSHOT
[facility], [place], Emergency Department    [address] [place] [id number]    Phone:  [phone]                                       Jefry Hatfield   MRN: [id number]    Department:  [facility], Emergency Department   Date of Visit:  4/28/2018           After Visit Summary Signature Page     I have received my discharge instructions, and my questions have been answered. I have discussed any challenges I see with this plan with the nurse or doctor.    ..........................................................................................................................................  Patient/Patient Representative Signature      ..........................................................................................................................................  Patient Representative Print Name and Relationship to Patient    ..................................................               ................................................  Date                                            Time    ..........................................................................................................................................  Reviewed by Signature/Title    ...................................................              ..............................................  Date                                                            Time

## 2018-04-28 NOTE — ED AVS SNAPSHOT
" [facility], Emergency Department    [address], [place] [address]    Phone:  [phone]                                       Jefry Hatfield   MRN: [id number]    Department:  [facility], Emergency Department   Date of Visit:  4/28/2018           Patient Information     Date Of Birth          1950        Your diagnoses for this visit were:     Nephrolithiasis        You were seen by Blas Escalera MD.        Discharge Instructions       Please make an appointment to follow up with Urology Clinic (phone: [phone]).         * KIDNEY STONE (w/ Colic)    The sharp cramping pain and nausea/vomiting that you have is due to a small stone which has formed in the kidney and is now passing down a narrow tube (ureter) on its way to your bladder. Once it reaches your bladder, the pain will stop. The stone may pass in your urine stream in one piece. [The size may be 1/16\" to 1/4\" (1-6mm)]. Or, the stone may also break up into joann fragments which you may not even notice.  Once you have had a kidney stone there is a risk for recurrence in the future.  HOME CARE:      Drink lots of fluid (at least 8-10 glasses of water a day).    Most stones will pass on their own, but may take from a few hours to a few days.    Each time you urinate, do so in a jar. Pour the urine from the jar through the strainer and into the toilet. Continue doing this until 24 hours after your pain stops. By then, if there was a kidney stone, it should pass from your bladder. Some stones dissolve into sand-like particles and pass right through the strainer. In that case, you won't ever see a stone.    Save any stone that you find in the strainer and bring it to your doctor for analysis. It may be possible to prevent certain types of stones from forming. Therefore, it is important to know what kind of stone you have.    Try to stay as active as possible since this will help the stone pass. Do not stay in bed unless " your pain prevents you from getting up. You may notice a red, pink or brown color to your urine. This is normal while passing a kidney stone.  FOLLOW UP with your doctor or return to this facility if the pain lasts more than 48 hours.  GET PROMPT MEDICAL ATTENTION if any of the following occur:    Pain that is not controlled by the medicine given    Repeated vomiting or unable to keep down fluids    Weakness, dizziness or fainting    Fever over 101  F (38.3  C)    Passage of solid red or brown urine (can't see through it) or urine with lots of blood clots    Unable to pass urine for 8 hours and increasing bladder pressure    5067-5465 [employer]. [address] [place] [address]. All rights reserved. This information is not intended as a substitute for professional medical care. Always follow your healthcare professional's instructions.  This information has been modified by your health care provider with permission from the publisher.        Your next 10 appointments already scheduled     May 17, 2018 12:00 PM CDT   [facility] Extremity with Blake Murray PT   [facility] ([facility])    [address] [place] [address]   [phone]            May 25, 2018   Procedure with Zak Mcclelland MD   [facility] ([facility])    [address] [place] [address]   [phone]           Located in the Clinics and Surgery Center at [address] [place] [address].   parking is very convenient and highly recommended.  is a $6 flat rate fee.  Both  and self parkers should enter the main arrival plaza from [facility]; parking attendants will direct you based on your parking preference.              24 Hour Appointment Hotline       To make an appointment at any [facility], call [phone] ([phone]). If you  don't have a family doctor or clinic, we will help you find one. [facility] clinics are conveniently located to serve the needs of you and your family.             Review of your medicines      START taking        Dose / Directions Last dose taken    oxyCODONE IR 5 MG tablet   Commonly known as:  ROXICODONE   Dose:  5 mg   Quantity:  15 tablet        Take 1 tablet (5 mg) by mouth every 6 hours as needed for pain   Refills:  0        tamsulosin 0.4 MG capsule   Commonly known as:  FLOMAX   Dose:  0.4 mg   Quantity:  10 capsule        Take 1 capsule (0.4 mg) by mouth daily for 10 doses   Refills:  0          Our records show that you are taking the medicines listed below. If these are incorrect, please call your family doctor or clinic.        Dose / Directions Last dose taken    CALCIUM-MAG-VIT C-VIT D PO        Take by mouth 2 times daily   Refills:  0        OMEGA-3 FISH OIL PO        Take by mouth daily   Refills:  0        SAW PALMETTO EXTRACT PO        Take by mouth daily   Refills:  0        VITAMIN D (CHOLECALCIFEROL) PO        Take by mouth daily   Refills:  0                Information about OPIOIDS     PRESCRIPTION OPIOIDS: WHAT YOU NEED TO KNOW   You have a prescription for an opioid (narcotic) pain medicine. Opioids can cause addiction. If you have a history of chemical dependency of any type, you are at a higher risk of becoming addicted to opioids. Only take this medicine after all other options have been tried. Take it for as short a time and as few doses as possible.     Do not:    Drive. If you drive while taking these medicines, you could be arrested for driving under the influence (DUI).    Operate heavy machinery    Do any other dangerous activities while taking these medicines.     Drink any alcohol while taking these medicines.      Take with any other medicines that contain acetaminophen. Read all labels carefully. Look for the word  acetaminophen  or  Tylenol.  Ask your pharmacist if you have  questions or are unsure.    Store your pills in a secure place, locked if possible. We will not replace any lost or stolen medicine. If you don t finish your medicine, please throw away (dispose) as directed by your pharmacist. The [place] Pollution Control Agency has more information about safe disposal: https://www.pca.[facility].mn.us/living-green/managing-unwanted-medications    All opioids tend to cause constipation. Drink plenty of water and eat foods that have a lot of fiber, such as fruits, vegetables, prune juice, apple juice and high-fiber cereal. Take a laxative (Miralax, milk of magnesia, Colace, Senna) if you don t move your bowels at least every other day.         Prescriptions were sent or printed at these locations (2 Prescriptions)                   Other Prescriptions                Printed at Department/Unit printer (2 of 2)         oxyCODONE IR (ROXICODONE) 5 MG tablet               tamsulosin (FLOMAX) 0.4 MG capsule                Procedures and tests performed during your visit     Abd/pelvis CT no contrast - Stone Protocol    Basic metabolic panel    CBC with platelets differential    UA with Microscopic reflex to Culture      Orders Needing Specimen Collection     None      Pending Results     Date and Time Order Name Status Description    4/29/2018 0018 Abd/pelvis CT no contrast - Stone Protocol Preliminary             Pending Culture Results     No orders found for last 3 day(s).            Pending Results Instructions     If you had any lab results that were not finalized at the time of your Discharge, you can call the ED Lab Result RN at [phone]. You will be contacted by this team for any positive Lab results or changes in treatment. The nurses are available 7 days a week from 10A to 6:30P.  You can leave a message 24 hours per day and they will return your call.        Thank you for choosing [facility]       Thank you for choosing [facility] for your care. Our goal is always to provide you  with excellent care. Hearing back from our patients is one way we can continue to improve our services. Please take a few minutes to complete the written survey that you may receive in the mail after you visit with us. Thank you!        [facility] Information     [facility] gives you secure access to your electronic health record. If you see a primary care provider, you can also send messages to your care team and make appointments. If you have questions, please call your primary care clinic.  If you do not have a primary care provider, please call [phone] and they will assist you.        Care EveryWhere ID     This is your Care EveryWhere ID. This could be used by other organizations to access your [facility] medical records  [id number]        Equal Access to Services     UTE [place] : Megan Desai, oswaldo daly, surinder lee, danika diallo. So [facility] [phone].    ATENCIÓN: Si habla español, tiene a payan disposición servicios gratuitos de asistencia lingüística. Llame al [phone].    We comply with applicable federal civil rights laws and [place] laws. We do not discriminate on the basis of race, color, national origin, age, disability, sex, sexual orientation, or gender identity.            After Visit Summary       This is your record. Keep this with you and show to your community pharmacist(s) and doctor(s) at your next visit.

## 2018-04-29 ENCOUNTER — APPOINTMENT (OUTPATIENT)
Dept: CT IMAGING | Facility: CLINIC | Age: 68
End: 2018-04-29
Attending: EMERGENCY MEDICINE
Payer: COMMERCIAL

## 2018-04-29 VITALS
HEIGHT: 72 IN | TEMPERATURE: 97.5 F | RESPIRATION RATE: 18 BRPM | WEIGHT: 175 LBS | BODY MASS INDEX: 23.7 KG/M2 | SYSTOLIC BLOOD PRESSURE: 120 MMHG | DIASTOLIC BLOOD PRESSURE: 70 MMHG | OXYGEN SATURATION: 100 % | HEART RATE: 60 BPM

## 2018-04-29 LAB
ALBUMIN UR-MCNC: 10 MG/DL
AMORPH CRY #/AREA URNS HPF: ABNORMAL /HPF
ANION GAP SERPL CALCULATED.3IONS-SCNC: 4 MMOL/L (ref 3–14)
APPEARANCE UR: CLEAR
BASOPHILS # BLD AUTO: 0 10E9/L (ref 0–0.2)
BASOPHILS NFR BLD AUTO: 0.2 %
BILIRUB UR QL STRIP: NEGATIVE
BUN SERPL-MCNC: 29 MG/DL (ref 7–30)
CALCIUM SERPL-MCNC: 8.8 MG/DL (ref 8.5–10.1)
CHLORIDE SERPL-SCNC: 106 MMOL/L (ref 94–109)
CO2 SERPL-SCNC: 31 MMOL/L (ref 20–32)
COLOR UR AUTO: YELLOW
CREAT SERPL-MCNC: 1.24 MG/DL (ref 0.66–1.25)
DIFFERENTIAL METHOD BLD: NORMAL
EOSINOPHIL # BLD AUTO: 0.1 10E9/L (ref 0–0.7)
EOSINOPHIL NFR BLD AUTO: 0.8 %
ERYTHROCYTE [DISTWIDTH] IN BLOOD BY AUTOMATED COUNT: 12.6 % (ref 10–15)
GFR SERPL CREATININE-BSD FRML MDRD: 58 ML/MIN/1.7M2
GLUCOSE SERPL-MCNC: 115 MG/DL (ref 70–99)
GLUCOSE UR STRIP-MCNC: NEGATIVE MG/DL
HCT VFR BLD AUTO: 44.5 % (ref 40–53)
HGB BLD-MCNC: 15.5 G/DL (ref 13.3–17.7)
HGB UR QL STRIP: ABNORMAL
IMM GRANULOCYTES # BLD: 0 10E9/L (ref 0–0.4)
IMM GRANULOCYTES NFR BLD: 0.2 %
KETONES UR STRIP-MCNC: 40 MG/DL
LEUKOCYTE ESTERASE UR QL STRIP: NEGATIVE
LYMPHOCYTES # BLD AUTO: 1.9 10E9/L (ref 0.8–5.3)
LYMPHOCYTES NFR BLD AUTO: 28.5 %
MCH RBC QN AUTO: 31.5 PG (ref 26.5–33)
MCHC RBC AUTO-ENTMCNC: 34.8 G/DL (ref 31.5–36.5)
MCV RBC AUTO: 90 FL (ref 78–100)
MONOCYTES # BLD AUTO: 0.4 10E9/L (ref 0–1.3)
MONOCYTES NFR BLD AUTO: 6 %
MUCOUS THREADS #/AREA URNS LPF: PRESENT /LPF
NEUTROPHILS # BLD AUTO: 4.3 10E9/L (ref 1.6–8.3)
NEUTROPHILS NFR BLD AUTO: 64.3 %
NITRATE UR QL: NEGATIVE
NRBC # BLD AUTO: 0 10*3/UL
NRBC BLD AUTO-RTO: 0 /100
PH UR STRIP: 8 PH (ref 5–7)
PLATELET # BLD AUTO: 181 10E9/L (ref 150–450)
POTASSIUM SERPL-SCNC: 4.5 MMOL/L (ref 3.4–5.3)
RADIOLOGIST FLAGS: ABNORMAL
RBC # BLD AUTO: 4.92 10E12/L (ref 4.4–5.9)
RBC #/AREA URNS AUTO: 1 /HPF (ref 0–2)
SODIUM SERPL-SCNC: 141 MMOL/L (ref 133–144)
SOURCE: ABNORMAL
SP GR UR STRIP: 1.02 (ref 1–1.03)
UROBILINOGEN UR STRIP-MCNC: NORMAL MG/DL (ref 0–2)
WBC # BLD AUTO: 6.6 10E9/L (ref 4–11)
WBC #/AREA URNS AUTO: 2 /HPF (ref 0–5)

## 2018-04-29 PROCEDURE — 96375 TX/PRO/DX INJ NEW DRUG ADDON: CPT

## 2018-04-29 PROCEDURE — 81001 URINALYSIS AUTO W/SCOPE: CPT | Performed by: EMERGENCY MEDICINE

## 2018-04-29 PROCEDURE — 96361 HYDRATE IV INFUSION ADD-ON: CPT | Mod: 59

## 2018-04-29 PROCEDURE — 74176 CT ABD & PELVIS W/O CONTRAST: CPT

## 2018-04-29 PROCEDURE — 25000128 H RX IP 250 OP 636: Performed by: EMERGENCY MEDICINE

## 2018-04-29 PROCEDURE — 25000132 ZZH RX MED GY IP 250 OP 250 PS 637: Performed by: EMERGENCY MEDICINE

## 2018-04-29 PROCEDURE — 80048 BASIC METABOLIC PNL TOTAL CA: CPT | Performed by: EMERGENCY MEDICINE

## 2018-04-29 PROCEDURE — 85025 COMPLETE CBC W/AUTO DIFF WBC: CPT | Performed by: EMERGENCY MEDICINE

## 2018-04-29 PROCEDURE — 96374 THER/PROPH/DIAG INJ IV PUSH: CPT

## 2018-04-29 RX ORDER — HYDROMORPHONE HYDROCHLORIDE 1 MG/ML
1 INJECTION, SOLUTION INTRAMUSCULAR; INTRAVENOUS; SUBCUTANEOUS ONCE
Status: COMPLETED | OUTPATIENT
Start: 2018-04-29 | End: 2018-04-29

## 2018-04-29 RX ORDER — TAMSULOSIN HYDROCHLORIDE 0.4 MG/1
0.4 CAPSULE ORAL DAILY
Status: DISCONTINUED | OUTPATIENT
Start: 2018-04-29 | End: 2018-04-29

## 2018-04-29 RX ORDER — OXYCODONE HYDROCHLORIDE 5 MG/1
5 TABLET ORAL EVERY 6 HOURS PRN
Qty: 15 TABLET | Refills: 0 | Status: SHIPPED | OUTPATIENT
Start: 2018-04-29 | End: 2021-09-01

## 2018-04-29 RX ORDER — TAMSULOSIN HYDROCHLORIDE 0.4 MG/1
0.4 CAPSULE ORAL ONCE
Status: COMPLETED | OUTPATIENT
Start: 2018-04-29 | End: 2018-04-29

## 2018-04-29 RX ORDER — KETOROLAC TROMETHAMINE 30 MG/ML
15 INJECTION, SOLUTION INTRAMUSCULAR; INTRAVENOUS ONCE
Status: COMPLETED | OUTPATIENT
Start: 2018-04-29 | End: 2018-04-29

## 2018-04-29 RX ORDER — TAMSULOSIN HYDROCHLORIDE 0.4 MG/1
0.4 CAPSULE ORAL DAILY
Qty: 10 CAPSULE | Refills: 0 | Status: SHIPPED | OUTPATIENT
Start: 2018-04-29 | End: 2019-03-19

## 2018-04-29 RX ADMIN — HYDROMORPHONE HYDROCHLORIDE 1 MG: 1 INJECTION, SOLUTION INTRAMUSCULAR; INTRAVENOUS; SUBCUTANEOUS at 00:35

## 2018-04-29 RX ADMIN — SODIUM CHLORIDE 1000 ML: 9 INJECTION, SOLUTION INTRAVENOUS at 00:35

## 2018-04-29 RX ADMIN — SODIUM CHLORIDE 1000 ML: 9 INJECTION, SOLUTION INTRAVENOUS at 02:42

## 2018-04-29 RX ADMIN — KETOROLAC TROMETHAMINE 15 MG: 30 INJECTION, SOLUTION INTRAMUSCULAR at 03:07

## 2018-04-29 RX ADMIN — TAMSULOSIN HYDROCHLORIDE 0.4 MG: 0.4 CAPSULE ORAL at 02:00

## 2018-04-29 ASSESSMENT — ENCOUNTER SYMPTOMS
BACK PAIN: 1
DYSURIA: 0
DIFFICULTY URINATING: 0
NAUSEA: 0
FEVER: 0
HEMATURIA: 0
ABDOMINAL PAIN: 1
VOMITING: 0
FREQUENCY: 0

## 2018-04-29 NOTE — ED PROVIDER NOTES
[facility] EMERGENCY DEPARTMENT ([facility])  4/28/18   History     Chief Complaint   Patient presents with     Back Pain     Abdominal Pain     Testicular/scrotal Pain     HPI  Jefry Hatfield is a 67 year old male with a medical history significant for cervical radiculopathy and s/p appendectomy who presents to the Emergency Department for evaluation of back pain and abdominal pain.  The patient states that he was doing some stretches tonight and after he began to notice some right lower back pain.  He soon after developed RLQ abdominal pain that began radiating down into his right groin.  He denies any fevers, nausea, vomiting, chest pain, difficulty urinating, hematuria or pain radiating down his leg.  The patient states that his bowel movements have been normal, he notes that he has not had a bowel movement today.  The patient is s/p appendectomy.  He denies any history of kidney stones.    I have reviewed the Medications, Allergies, Past Medical and Surgical History, and Social History in the [facility] system.    Past Medical History:   Diagnosis Date     Anisometropia      Cataract, right eye      High myopia      Myopic degeneration      Posterior staphyloma     LE     Pseudophakia of left eye      Retinal detachment RE 1971,[id number]    BE       Past Surgical History:   Procedure Laterality Date     APPENDECTOMY       CATARACT IOL, RT/LT Bilateral 11/14/12LE 7/1/14RE    BE     PHACOEMULSIFICATION CLEAR CORNEA WITH STANDARD INTRAOCULAR LENS IMPLANT  7/1/2014    Procedure: PHACOEMULSIFICATION CLEAR CORNEA WITH STANDARD INTRAOCULAR LENS IMPLANT;  Surgeon: Milan Bernardo MD;  Location: [facility]     SCLERAL BUCKLE  1971    RE     SCLERAL BUCKLE  1980    LE     YAG CAPSULOTOMY OD (RIGHT EYE)  10/30/2014       Family History   Problem Relation Age of Onset     Dementia Mother      Heart Failure Father      Asthma Father      DIABETES Paternal Grandfather      CANCER No family hx of      Glaucoma No  family hx of      Macular Degeneration No family hx of        Social History   Substance Use Topics     Smoking status: Never Smoker     Smokeless tobacco: Never Used     Alcohol use Yes      Comment: occaisonal        Current Facility-Administered Medications   Medication     0.9% sodium chloride BOLUS     HYDROmorphone (PF) (DILAUDID) injection 1 mg     Current Outpatient Prescriptions   Medication     CALCIUM-MAG-VIT C-VIT D PO     Omega-3 Fatty Acids (OMEGA-3 FISH OIL PO)     Saw Palmetto, Serenoa repens, (SAW PALMETTO EXTRACT PO)     VITAMIN D, CHOLECALCIFEROL, PO        Allergies   Allergen Reactions     Ranitidine Unknown         Review of Systems   Constitutional: Negative for fever.   Cardiovascular: Negative for chest pain.   Gastrointestinal: Positive for abdominal pain (RLQ with radiation into the right groin). Negative for nausea and vomiting.   Genitourinary: Negative for decreased urine volume, difficulty urinating, dysuria, frequency, hematuria and urgency.   Musculoskeletal: Positive for back pain (right lower).   All other systems reviewed and are negative.      Physical Exam   BP: 143/87  Pulse: 53  Temp: 97.5  F (36.4  C)  Resp: 18  Height: 182.9 cm (6')  Weight: 79.4 kg (175 lb)  SpO2: 100 %      Physical Exam   Constitutional: He is oriented to person, place, and time. No distress.   HENT:   Head: Normocephalic and atraumatic.   Mouth/Throat: No oropharyngeal exudate.   Eyes: Conjunctivae are normal. Pupils are equal, round, and reactive to light. Right eye exhibits no discharge. Left eye exhibits no discharge.   Neck: Normal range of motion. Neck supple.   Cardiovascular: Normal rate and intact distal pulses.    Pulmonary/Chest: Effort normal. No respiratory distress. He has no wheezes. He has no rales. He exhibits no tenderness.   Abdominal: Soft. He exhibits no distension. There is no tenderness. There is no rebound and no guarding.   Musculoskeletal: Normal range of motion. He exhibits no  edema or tenderness.   Neurological: He is alert and oriented to person, place, and time. He exhibits normal muscle tone.   Skin: Skin is warm and dry. No rash noted. He is not diaphoretic.   Psychiatric: He has a normal mood and affect. His behavior is normal. Thought content normal.   Nursing note and vitals reviewed.      ED Course   12:15 AM  The patient was seen and examined by Blas Escalera MD in Room ED23.     ED Course     Procedures             Critical Care time:  none             Labs Ordered and Resulted from Time of ED Arrival Up to the Time of Departure from the ED - No data to display         Assessments & Plan (with Medical Decision Making)   1.  R ureteral calculus    67-year-old male who presents with right flank pain and groin pain.  CT abdomen pelvis confirmed a 3 mm stone at the right UVJ with mild to moderate hydronephrosis.  Urinalysis with no evidence of infection.  Lab evaluation was unremarkable and he was afebrile.  His pain was controlled with IV Dilaudid and IV Toradol.  He was discharged with Flomax oxycodone and referral to urology.  Should return if any fever or intractable pain.         I have reviewed the nursing notes.    I have reviewed the findings, diagnosis, plan and need for follow up with the patient.    New Prescriptions    No medications on file       Final diagnoses:   None     ISandro, am serving as a trained medical scribe to document services personally performed by Blas Escalera MD, based on the provider's statements to me.   Blas KAPLAN MD, was physically present and have reviewed and verified the accuracy of this note documented by Sandro Hamilton.    4/28/2018   [facility], EMERGENCY DEPARTMENT     Blas Escalera MD  05/07/18 0228

## 2018-04-29 NOTE — DISCHARGE INSTRUCTIONS
"Please make an appointment to follow up with Urology Clinic (phone: [phone]).         * KIDNEY STONE (w/ Colic)    The sharp cramping pain and nausea/vomiting that you have is due to a small stone which has formed in the kidney and is now passing down a narrow tube (ureter) on its way to your bladder. Once it reaches your bladder, the pain will stop. The stone may pass in your urine stream in one piece. [The size may be 1/16\" to 1/4\" (1-6mm)]. Or, the stone may also break up into joann fragments which you may not even notice.  Once you have had a kidney stone there is a risk for recurrence in the future.  HOME CARE:      Drink lots of fluid (at least 8-10 glasses of water a day).    Most stones will pass on their own, but may take from a few hours to a few days.    Each time you urinate, do so in a jar. Pour the urine from the jar through the strainer and into the toilet. Continue doing this until 24 hours after your pain stops. By then, if there was a kidney stone, it should pass from your bladder. Some stones dissolve into sand-like particles and pass right through the strainer. In that case, you won't ever see a stone.    Save any stone that you find in the strainer and bring it to your doctor for analysis. It may be possible to prevent certain types of stones from forming. Therefore, it is important to know what kind of stone you have.    Try to stay as active as possible since this will help the stone pass. Do not stay in bed unless your pain prevents you from getting up. You may notice a red, pink or brown color to your urine. This is normal while passing a kidney stone.  FOLLOW UP with your doctor or return to this facility if the pain lasts more than 48 hours.  GET PROMPT MEDICAL ATTENTION if any of the following occur:    Pain that is not controlled by the medicine given    Repeated vomiting or unable to keep down fluids    Weakness, dizziness or fainting    Fever over 101  F (38.3  C)    Passage of " solid red or brown urine (can't see through it) or urine with lots of blood clots    Unable to pass urine for 8 hours and increasing bladder pressure    5164-0246 [employer]. [address] [place] [address], [place] [address]. All rights reserved. This information is not intended as a substitute for professional medical care. Always follow your healthcare professional's instructions.  This information has been modified by your health care provider with permission from the publisher.

## 2018-04-29 NOTE — ED TRIAGE NOTES
Pt had sudden onset right sided back pain that wraps into his abdomen and down into his right testicle after doing some stretches at around 2300

## 2018-05-01 ENCOUNTER — PRE VISIT (OUTPATIENT)
Dept: UROLOGY | Facility: CLINIC | Age: 68
End: 2018-05-01

## 2018-05-01 NOTE — TELEPHONE ENCOUNTER
MEDICAL RECORDS REQUEST   [facility]  Urology Clinic  [address] [place] [address]  PHONE: [phone]  Fax: [phone]        FUTURE VISIT INFORMATION                                                   Jefry Hatfield, : 1950 scheduled for future visit at [facility]    APPOINTMENT INFORMATION:    Date: 2018    Provider:  CARA LIMA    Reason for Visit/Diagnosis: STONES    REFERRAL INFORMATION:    Referring provider:  EMERGENCY DEPARTMENT    Specialty: ER    Referring providers clinic:  [facility]    Clinic contact number:  [phone]    RECORDS REQUESTED FOR VISIT                                                     NOTES  STATUS/DETAILS   OFFICE NOTE from referring provider  yes   OFFICE NOTE from other specialist  no   DISCHARGE SUMMARY from hospital  no   DISCHARGE REPORT from the ER  YES   OPERATIVE REPORT  no   MEDICATION LIST  yes       PRE-VISIT CHECKLIST      Record collection complete Yes RECORDS IN EPIC.. CDK   Appointment appropriately scheduled           (right time/right provider) Yes   Joint diagnostic appointment coordinated correctly          (ensure right order & amount of time) Yes   MyChart activation Yes   Questionnaire complete If no, please explain IN PROCESS     Completed by: Clarita King

## 2018-05-02 DIAGNOSIS — N20.0 KIDNEY STONE: Primary | ICD-10-CM

## 2018-05-06 LAB
APPEARANCE STONE: NORMAL
COMPN STONE: NORMAL
NUMBER STONE: 1
SIZE STONE: NORMAL MM
WT STONE: 13 MG

## 2018-05-08 ENCOUNTER — OFFICE VISIT (OUTPATIENT)
Dept: OPTOMETRY | Facility: CLINIC | Age: 68
End: 2018-05-08

## 2018-05-08 DIAGNOSIS — H52.31 ANISOMETROPIA: ICD-10-CM

## 2018-05-08 DIAGNOSIS — H44.23 DEGENERATIVE MYOPIA, BILATERAL: Primary | ICD-10-CM

## 2018-05-08 NOTE — MR AVS SNAPSHOT
After Visit Summary   5/8/2018    Jefry Hatfield    MRN: [id number]           Patient Information     Date Of Birth          1950        Visit Information        Provider Department      5/8/2018 8:00 AM Gabriela Saenz OD [facility]        Today's Diagnoses     Degenerative myopia, bilateral    -  1    Anisometropia           Follow-ups after your visit        Your next 10 appointments already scheduled     May 17, 2018 12:00 PM CDT   AMADOU Extremity with Blake Murray PT   [facility] Physical Therapy AMADOU ([facility] [place])    [address] [place] [address] [phone]            May 25, 2018   Procedure with Zak Mcclelland MD   [facility] ([facility])    [address] [place] [address] [phone]           Located in the Clinics and Surgery Center at [address] [place] [address].   parking is very convenient and highly recommended.  is a $6 flat rate fee.  Both  and self parkers should enter the main arrival plaza from [facility]; parking attendants will direct you based on your parking preference.            Jun 12, 2018  8:00 AM CDT   (Arrive by 7:45 AM)   New Renal Calculi with Milan Estrella MD   [facility]rology and [facility] ([facility] [place])    [address] [place] [address] [phone]              Who to contact     Please call your clinic at [phone] to:    Ask questions about your health    Make or cancel appointments    Discuss your medicines    Learn about your test results    Speak to your doctor            Additional Information About Your Visit        MyChart Information     Tailt gives you secure access to your electronic health record. If you see a primary care provider, you can also send messages  to your care team and make appointments. If you have questions, please call your primary care clinic.  If you do not have a primary care provider, please call [phone] and they will assist you.      SmartKickz is an electronic gateway that provides easy, online access to your medical records. With [employer], you can request a clinic appointment, read your test results, renew a prescription or communicate with your care team.     To access your existing account, please contact your [facility] or call [phone] for assistance.        Care EveryWhere ID     This is your Care EveryWhere ID. This could be used by other organizations to access your [place] medical records  [id number]         Blood Pressure from Last 3 Encounters:   04/29/18 120/70   03/27/18 109/73   08/18/15 131/89    Weight from Last 3 Encounters:   04/28/18 79.4 kg (175 lb)   04/02/18 78.9 kg (174 lb)   03/27/18 79 kg (174 lb 1.6 oz)              Today, you had the following     No orders found for display       Primary Care Provider Office Phone # Fax #    Mograbiel Palomo [phone]       [place] [address]        Equal Access to Services     UTE [place] : Hadii arminda ku hadasho Soomaali, waaxda luqadaha, qaybta kaalmada adeegyada, danika diallo. So [facility] [phone].    ATENCIÓN: Si habla español, tiene a payan disposición servicios gratuitos de asistencia lingüística. Llame al [phone].    We comply with applicable federal civil rights laws and [place] laws. We do not discriminate on the basis of race, color, national origin, age, disability, sex, sexual orientation, or gender identity.            Thank you!     Thank you for choosing [facility] CLINIC  for your care. Our goal is always to provide you with excellent care. Hearing back from our patients is one way we can continue to improve our services. Please take a few minutes to complete  the written survey that you may receive in the mail after your visit with us. Thank you!             Your Updated Medication List - Protect others around you: Learn how to safely use, store and throw away your medicines at www.disposemymeds.org.          This list is accurate as of 5/8/18 11:59 PM.  Always use your most recent med list.                   Brand Name Dispense Instructions for use Diagnosis    CALCIUM-MAG-VIT C-VIT D PO      Take by mouth 2 times daily        OMEGA-3 FISH OIL PO      Take by mouth daily        oxyCODONE IR 5 MG tablet    ROXICODONE    15 tablet    Take 1 tablet (5 mg) by mouth every 6 hours as needed for pain        SAW PALMETTO EXTRACT PO      Take by mouth daily        tamsulosin 0.4 MG capsule    FLOMAX    10 capsule    Take 1 capsule (0.4 mg) by mouth daily for 10 doses        VITAMIN D (CHOLECALCIFEROL) PO      Take by mouth daily

## 2018-05-09 ASSESSMENT — REFRACTION_CURRENTRX
OS_BRAND: DAILIES TOTAL 1
OS_SPHERE: -1.50
OD_BASECURVE: 8.5
OS_SPHERE: -1.50
OD_DIAMETER: 14.1
OD_BASECURVE: 9.0
OD_BRAND: DAILIES TOTAL 1
OS_BASECURVE: 9.0
OS_DIAMETER: 14.1
OS_DIAMETER: 14.7
OS_BASECURVE: 8.5
OD_SPHERE: -12.00
OD_DIAMETER: 14.2

## 2018-05-09 NOTE — PROGRESS NOTES
No office visit. CL order only.    Contact Lens Billing  V-Code:  - Soft spherical  Final Contact Lens Rx      Brand Base Curve Diameter Sphere Lens   Right Dailies Total 1 8.5 14.1 -12.00 pt preferred -12.00 over -11.00   Left Dailies Total 1 8.5 14.1 -1.50             # of units: 2 90-packs  Price per Unit: $95 per 90-pack    This patient requires contact lenses that are medically necessary for either improvement in vision over spectacles, support of the ocular surface, or other therapeutic benefit. These are not cosmetic contact lenses.  Significant anisometropia, degenerative myopia    Encounter Diagnoses   Name Primary?     Degenerative myopia, bilateral Yes     Anisometropia         Date of last eye exam: 1/22/18

## 2018-05-14 ENCOUNTER — OFFICE VISIT (OUTPATIENT)
Dept: ORTHOPEDICS | Facility: CLINIC | Age: 68
End: 2018-05-14
Payer: COMMERCIAL

## 2018-05-14 ENCOUNTER — RADIANT APPOINTMENT (OUTPATIENT)
Dept: GENERAL RADIOLOGY | Facility: CLINIC | Age: 68
End: 2018-05-14
Attending: FAMILY MEDICINE
Payer: COMMERCIAL

## 2018-05-14 VITALS — RESPIRATION RATE: 16 BRPM | WEIGHT: 175 LBS | HEIGHT: 72 IN | BODY MASS INDEX: 23.7 KG/M2

## 2018-05-14 DIAGNOSIS — M25.511 PAIN IN JOINT OF RIGHT SHOULDER: ICD-10-CM

## 2018-05-14 DIAGNOSIS — M25.511 PAIN IN JOINT OF RIGHT SHOULDER: Primary | ICD-10-CM

## 2018-05-14 RX ORDER — TRIAMCINOLONE ACETONIDE 40 MG/ML
80 INJECTION, SUSPENSION INTRA-ARTICULAR; INTRAMUSCULAR ONCE
Qty: 2 ML | Refills: 0 | OUTPATIENT
Start: 2018-05-14 | End: 2019-03-19

## 2018-05-14 NOTE — MR AVS SNAPSHOT
After Visit Summary   5/14/2018    Jefry Hatfield    MRN: [id number]           Patient Information     Date Of Birth          1950        Visit Information        Provider Department      5/14/2018 3:15 PM Milan Scruggs MD [facility] Sports Medicine        Today's Diagnoses     Pain in joint of right shoulder    -  1       Follow-ups after your visit        Your next 10 appointments already scheduled     May 17, 2018 12:00 PM CDT   AMADOU Extremity with Blake Murray PT   [facility] Physical Therapy AMADOU ([facility])    [address] [place] [address] [phone]            May 25, 2018   Procedure with Zak Mcclelland MD   [facility] ([facility])    [address] [place] [address] [phone]           Located in the Clinics and Surgery Center at [address] [place] [address].   parking is very convenient and highly recommended.  is a $6 flat rate fee.  Both  and self parkers should enter the main arrival plaza from [facility]; parking attendants will direct you based on your parking preference.            Jun 12, 2018  8:00 AM CDT   (Arrive by 7:45 AM)   New Renal Calculi with Milan Estrella MD   [facility] and [facility] ([facility])    [address] [place] [address] [phone]              Who to contact     Please call your clinic at [phone] to:    Ask questions about your health    Make or cancel appointments    Discuss your medicines    Learn about your test results    Speak to your doctor            Additional Information About Your Visit        MyChart Information     T[employer]Upt gives you secure access to your electronic health record. If you see a primary care provider, you can also send messages to  your care team and make appointments. If you have questions, please call your primary care clinic.  If you do not have a primary care provider, please call [phone] and they will assist you.      [employer] Media is an electronic gateway that provides easy, online access to your medical records. With [employer], you can request a clinic appointment, read your test results, renew a prescription or communicate with your care team.     To access your existing account, please contact your [facility] or call [phone] for assistance.        Care EveryWhere ID     This is your Care EveryWhere ID. This could be used by other organizations to access your [place] medical records  [id number]        Your Vitals Were     Respirations Height BMI (Body Mass Index)             16 6' (1.829 m) 23.73 kg/m2          Blood Pressure from Last 3 Encounters:   04/29/18 120/70   03/27/18 109/73   08/18/15 131/89    Weight from Last 3 Encounters:   05/14/18 175 lb (79.4 kg)   04/28/18 175 lb (79.4 kg)   04/02/18 174 lb (78.9 kg)              We Performed the Following     Large Joint/Bursa injection and/or drainage - Unilateral (Shoulder, Knee) [90533]     TRIAMCINOLONE ACET INJ NOS          Today's Medication Changes          These changes are accurate as of 5/14/18  4:31 PM.  If you have any questions, ask your nurse or doctor.               Start taking these medicines.        Dose/Directions    triamcinolone acetonide 40 MG/ML injection   Commonly known as:  KENALOG   Used for:  Pain in joint of right shoulder   Started by:  Milan Scruggs MD        Dose:  80 mg   2 mLs (80 mg) by INTRA-ARTICULAR route once for 1 dose   Quantity:  2 mL   Refills:  0            Where to get your medicines      Some of these will need a paper prescription and others can be bought over the counter.  Ask your nurse if you have questions.     You don't need a prescription for these medications     triamcinolone  acetonide 40 MG/ML injection                Primary Care Provider Office Phone # Fax #    Daniela MD Dewey [phone]       [place] [address]        Equal Access to Services     UTE [place] : Hadii aad ku hadbrandin Desai, waaxda luqadaha, qaybta kaalmada adefrancisca, danika filippoin hayaaclarence riggschacorta aguirre harris diallo. So [facility] [phone].    ATENCIÓN: Si habla español, tiene a payan disposición servicios gratuitos de asistencia lingüística. Llame al [phone].    We comply with applicable federal civil rights laws and [place] laws. We do not discriminate on the basis of race, color, national origin, age, disability, sex, sexual orientation, or gender identity.            Thank you!     Thank you for choosing [facility]  for your care. Our goal is always to provide you with excellent care. Hearing back from our patients is one way we can continue to improve our services. Please take a few minutes to complete the written survey that you may receive in the mail after your visit with us. Thank you!             Your Updated Medication List - Protect others around you: Learn how to safely use, store and throw away your medicines at www.disposemymeds.org.          This list is accurate as of 5/14/18  4:31 PM.  Always use your most recent med list.                   Brand Name Dispense Instructions for use Diagnosis    CALCIUM-MAG-VIT C-VIT D PO      Take by mouth 2 times daily        OMEGA-3 FISH OIL PO      Take by mouth daily        oxyCODONE IR 5 MG tablet    ROXICODONE    15 tablet    Take 1 tablet (5 mg) by mouth every 6 hours as needed for pain        SAW PALMETTO EXTRACT PO      Take by mouth daily        triamcinolone acetonide 40 MG/ML injection    KENALOG    2 mL    2 mLs (80 mg) by INTRA-ARTICULAR route once for 1 dose    Pain in joint of right shoulder       VITAMIN D (CHOLECALCIFEROL) PO      Take by mouth daily

## 2018-05-14 NOTE — LETTER
5/14/2018      RE: Jefry Hatfield  [address] [place] [address]        Subjective:   Jefry Hatfield is a 67 year old male who is here following up on tendinopathy of rotator cuff on right shoulder.     He notes that the PT hasn't dealt with the problem, although he knows that PT takes time. He would like to additional imaging.       Date of injury: NA  Date last seen: 4/2/2018  Following Therapeutic Plan: Yes PT  Pain: Worsening  Function: Worsening  Interval History:      PAST MEDICAL, SOCIAL, SURGICAL AND FAMILY HISTORY: He  has a past medical history of Anisometropia; Cataract, right eye; High myopia; Myopic degeneration; Posterior staphyloma; Pseudophakia of left eye; and Retinal detachment (RE 1971,[id number]). He also has no past medical history of Diabetes (H) or Diabetic retinopathy (H).  He  has a past surgical history that includes Scleral buckle (1971); Scleral buckle (1980); cataract iol, rt/lt (Bilateral, 11/14/12LE 7/1/14RE); Phacoemulsification clear cornea with standard intraocular lens implant (7/1/2014); YAG Capsulotomy OD (right eye) (10/30/2014); and appendectomy.  His family history includes Asthma in his father; DIABETES in his paternal grandfather; Dementia in his mother; Heart Failure in his father. There is no history of CANCER, Glaucoma, or Macular Degeneration.  He reports that he has never smoked. He has never used smokeless tobacco. He reports that he drinks alcohol. He reports that he does not use illicit drugs.    ALLERGIES: He is allergic to ranitidine.    CURRENT MEDICATIONS: He has a current medication list which includes the following prescription(s): calcium-mag-vit c-vit d, omega-3 fatty acids, saw palmetto (serenoa repens), cholecalciferol, and oxycodone ir.     REVIEW OF SYSTEMS: 3 point review of systems is negative except as noted above.     Exam:   Resp 16  Ht 6' (1.829 m)  Wt 175 lb (79.4 kg)  BMI 23.73 kg/m2     CONSTITUTIONAL: healthy, alert and  no distress  HEAD: Normocephalic. No masses, lesions, tenderness or abnormalities  SKIN: no suspicious lesions or rashes  GAIT: normal  NEUROLOGIC: Non-focal  PSYCHIATRIC: affect normal/bright and mentation appears normal.    MUSCULOSKELETAL:   Right Shoulder  Inspection mils posterior wasting  Palpation tender lateral shoulder, nontender ac, nontender paracervical spine    AROM  abduction 160  ER 50 IR L3  PROM full    Strength:   FF 5/5 + pain  abduction 4/5+ pain   ER at 0 deg 5/5 +pain   ER at 90 deg 5/5-- pain  (-) belly press    (-) Neer's, (+) James (-) modified James  (-) ac joint tenderness (-) crossover test  (-) [place]'s (-) crank test  (-) Yergason's (-) Speeds  (-) sulcus sign (-) increased translation with load and shift (-) apprehension (-) relocation test    AROM cervical spine without pain, neg yergasons, symmetric UE reflexes.     Assessment/Plan:   R shoulder pain   --consistent with subacromial impingement  Hx Cervical Spondolysis    Plan  --we discussed options and I think this will likely get better with pain control and therapy, but we discussed the option of MRI R shoulder to look for surgical lesions such as complete cuff tear.  We decided on subacromial injection and resumption of PT R shoulder. If not improving in 2 weeks he can call for R shoulder MRI to see if there fitz more significant lesion. If more neck pain, we could evaluate the cervical spine though those symptoms are not prominent today.      A steroid injection was performed at R subacromial space using 8cc 1% plain Lidocaine and 80 mg of Kenalog. This was well tolerated.    Milan Scruggs MD CAQ

## 2018-05-14 NOTE — NURSING NOTE
[address] [address]  Dept: [phone]  ______________________________________________________________________________    Patient: Jefry Hatfield   : 1950   MRN: [id number]   May 14, 2018    INVASIVE PROCEDURE SAFETY CHECKLIST    Date: 18   Procedure:Right shoulder sub-acromial kenalog injection  Patient Name: Jefry Hatfield  MRN: [id number]  YOB: 1950    Action: Complete sections as appropriate. Any discrepancy results in a HARD COPY until resolved.     PRE PROCEDURE:  Patient ID verified with 2 identifiers (name and  or MRN): Yes  Procedure and site verified with patient/designee (when able): Yes  Accurate consent documentation in medical record: Yes  H&P (or appropriate assessment) documented in medical record: Yes  H&P must be up to 20 days prior to procedure and updates within 24 hours of procedure as applicable: NA  Relevant diagnostic and radiology test results appropriately labeled and displayed as applicable: Yes  Procedure site(s) marked with provider initials: NA    TIMEOUT:  Time-Out performed immediately prior to starting procedure, including verbal and active participation of all team members addressing the following:Yes  * Correct patient identify  * Confirmed that the correct side and site are marked  * An accurate procedure consent form  * Agreement on the procedure to be done  * Correct patient position  * Relevant images and results are properly labeled and appropriately displayed  * The need to administer antibiotics or fluids for irrigation purposes during the procedure as applicable   * Safety precautions based on patient history or medication use    DURING PROCEDURE: Verification of correct person, site, and procedures any time the responsibility for care of the patient is transferred to another member of the care team.     The following medication was given:     MEDICATION:   Kenalog 80 mg; 8ml of 1% lidocaine  ROUTE: Sub-Acromial  SITE: Right shoulder  DOSE: Kenalog 80 mg; 8ml of 1% lidocaine  LOT #: Kenalog: [id number]; Lidocaine: [id number]  : Kenalog: [employer]; Lidocaine: [employer]  EXPIRATION DATE: Kenalo/20; Lidocaine:   NDC#: Kenalo23779-7793-3; Lidocaine: 67357-852-43   Was there drug waste? Yes  Amount of drug waste (mL): 2.  Reason for waste:  As per MD Orlin Nuno, ATC  May 14, 2018

## 2018-05-14 NOTE — PROGRESS NOTES
Subjective:   Jefry Hatfield is a 67 year old male who is here following up on tendinopathy of rotator cuff on right shoulder.     He notes that the PT hasn't dealt with the problem, although he knows that PT takes time. He would like to additional imaging.       Date of injury: NA  Date last seen: 4/2/2018  Following Therapeutic Plan: Yes PT  Pain: Worsening  Function: Worsening  Interval History:      PAST MEDICAL, SOCIAL, SURGICAL AND FAMILY HISTORY: He  has a past medical history of Anisometropia; Cataract, right eye; High myopia; Myopic degeneration; Posterior staphyloma; Pseudophakia of left eye; and Retinal detachment (RE 1971,[id number]). He also has no past medical history of Diabetes (H) or Diabetic retinopathy (H).  He  has a past surgical history that includes Scleral buckle (1971); Scleral buckle (1980); cataract iol, rt/lt (Bilateral, 11/14/12LE 7/1/14RE); Phacoemulsification clear cornea with standard intraocular lens implant (7/1/2014); YAG Capsulotomy OD (right eye) (10/30/2014); and appendectomy.  His family history includes Asthma in his father; DIABETES in his paternal grandfather; Dementia in his mother; Heart Failure in his father. There is no history of CANCER, Glaucoma, or Macular Degeneration.  He reports that he has never smoked. He has never used smokeless tobacco. He reports that he drinks alcohol. He reports that he does not use illicit drugs.    ALLERGIES: He is allergic to ranitidine.    CURRENT MEDICATIONS: He has a current medication list which includes the following prescription(s): calcium-mag-vit c-vit d, omega-3 fatty acids, saw palmetto (serenoa repens), cholecalciferol, and oxycodone ir.     REVIEW OF SYSTEMS: 3 point review of systems is negative except as noted above.     Exam:   Resp 16  Ht 6' (1.829 m)  Wt 175 lb (79.4 kg)  BMI 23.73 kg/m2     CONSTITUTIONAL: healthy, alert and no distress  HEAD: Normocephalic. No masses, lesions, tenderness or abnormalities  SKIN:  no suspicious lesions or rashes  GAIT: normal  NEUROLOGIC: Non-focal  PSYCHIATRIC: affect normal/bright and mentation appears normal.    MUSCULOSKELETAL:   Right Shoulder  Inspection mils posterior wasting  Palpation tender lateral shoulder, nontender ac, nontender paracervical spine    AROM  abduction 160  ER 50 IR L3  PROM full    Strength:   FF 5/5 + pain  abduction 4/5+ pain   ER at 0 deg 5/5 +pain   ER at 90 deg 5/5-- pain  (-) belly press    (-) Neer's, (+) James (-) modified James  (-) ac joint tenderness (-) crossover test  (-) Percy's (-) crank test  (-) Yergason's (-) Speeds  (-) sulcus sign (-) increased translation with load and shift (-) apprehension (-) relocation test    AROM cervical spine without pain, neg yergasons, symmetric UE reflexes.     Assessment/Plan:   R shoulder pain   --consistent with subacromial impingement  Hx Cervical Spondolysis    Plan  --we discussed options and I think this will likely get better with pain control and therapy, but we discussed the option of MRI R shoulder to look for surgical lesions such as complete cuff tear.  We decided on subacromial injection and resumption of PT R shoulder. If not improving in 2 weeks he can call for R shoulder MRI to see if there fitz more significant lesion. If more neck pain, we could evaluate the cervical spine though those symptoms are not prominent today.      A steroid injection was performed at R subacromial space using 8cc 1% plain Lidocaine and 80 mg of Kenalog. This was well tolerated.    Milan Scruggs MD CAQ

## 2018-05-16 ENCOUNTER — PRE VISIT (OUTPATIENT)
Dept: UROLOGY | Facility: CLINIC | Age: 68
End: 2018-05-16

## 2018-05-16 NOTE — TELEPHONE ENCOUNTER
New patient consulting for kidney stone management after ED admission. Records retrieval in progress.

## 2018-05-17 ENCOUNTER — THERAPY VISIT (OUTPATIENT)
Dept: PHYSICAL THERAPY | Facility: CLINIC | Age: 68
End: 2018-05-17
Payer: COMMERCIAL

## 2018-05-17 DIAGNOSIS — M25.511 SHOULDER PAIN, RIGHT: ICD-10-CM

## 2018-05-17 PROCEDURE — 97530 THERAPEUTIC ACTIVITIES: CPT | Mod: GP | Performed by: PHYSICAL THERAPIST

## 2018-05-17 PROCEDURE — 97112 NEUROMUSCULAR REEDUCATION: CPT | Mod: GP | Performed by: PHYSICAL THERAPIST

## 2018-05-17 PROCEDURE — 97110 THERAPEUTIC EXERCISES: CPT | Mod: GP | Performed by: PHYSICAL THERAPIST

## 2018-05-18 ENCOUNTER — TELEPHONE (OUTPATIENT)
Dept: GASTROENTEROLOGY | Facility: CLINIC | Age: 68
End: 2018-05-18

## 2018-05-18 DIAGNOSIS — Z12.11 ENCOUNTER FOR SCREENING COLONOSCOPY: Primary | ICD-10-CM

## 2018-05-18 RX ORDER — BISACODYL 5 MG/1
15 TABLET, DELAYED RELEASE ORAL ONCE
Qty: 4 TABLET | Refills: 0 | Status: SHIPPED | OUTPATIENT
Start: 2018-05-18 | End: 2019-03-19

## 2018-05-18 NOTE — TELEPHONE ENCOUNTER
Patient scheduled for colonoscopy     Indication for procedure. Screening for neoplasms     Referring Provider. Daniela Palomo MD    ? No     Arrival time verified? Patient instructed to arrive at 0745 am     Facility location verified? [address] [facility], 5th floor     Instructions given regarding prep and procedure. Prep reviewed; RN answered all questions. Transportation policy reviewed and verbalized understanding.     Prep Type Golytely.     Are you taking any anticoagulants or blood thinners? Denies     Instructions given? Yes     Electronic implanted devices? Denies     Pre procedure teaching completed? Yes    Transportation from procedure? Yes     H&P / Pre op physical completed? N/A    Nicolás Shah, YUSUF

## 2018-05-25 ENCOUNTER — SURGERY (OUTPATIENT)
Age: 68
End: 2018-05-25

## 2018-05-25 ENCOUNTER — HOSPITAL ENCOUNTER (OUTPATIENT)
Facility: AMBULATORY SURGERY CENTER | Age: 68
End: 2018-05-25
Attending: INTERNAL MEDICINE
Payer: COMMERCIAL

## 2018-05-25 VITALS
RESPIRATION RATE: 16 BRPM | OXYGEN SATURATION: 100 % | DIASTOLIC BLOOD PRESSURE: 81 MMHG | SYSTOLIC BLOOD PRESSURE: 134 MMHG | TEMPERATURE: 98 F

## 2018-05-25 LAB — COLONOSCOPY: NORMAL

## 2018-05-25 RX ORDER — LIDOCAINE 40 MG/G
CREAM TOPICAL
Status: DISCONTINUED | OUTPATIENT
Start: 2018-05-25 | End: 2018-05-26 | Stop reason: HOSPADM

## 2018-05-25 RX ORDER — FENTANYL CITRATE 50 UG/ML
INJECTION, SOLUTION INTRAMUSCULAR; INTRAVENOUS PRN
Status: DISCONTINUED | OUTPATIENT
Start: 2018-05-25 | End: 2018-05-25 | Stop reason: HOSPADM

## 2018-05-25 RX ORDER — ONDANSETRON 2 MG/ML
4 INJECTION INTRAMUSCULAR; INTRAVENOUS
Status: DISCONTINUED | OUTPATIENT
Start: 2018-05-25 | End: 2018-05-26 | Stop reason: HOSPADM

## 2018-05-25 RX ADMIN — FENTANYL CITRATE 100 MCG: 50 INJECTION, SOLUTION INTRAMUSCULAR; INTRAVENOUS at 09:02

## 2018-05-25 RX ADMIN — FENTANYL CITRATE 50 MCG: 50 INJECTION, SOLUTION INTRAMUSCULAR; INTRAVENOUS at 09:14

## 2018-05-25 RX ADMIN — FENTANYL CITRATE 50 MCG: 50 INJECTION, SOLUTION INTRAMUSCULAR; INTRAVENOUS at 09:22

## 2018-05-25 NOTE — IP AVS SNAPSHOT
[facility]    [address] [place] [address]    Phone:  [phone]    Fax:  [phone]                                       After Visit Summary   5/25/2018    Jefry Hatfield    MRN: [id number]           After Visit Summary Signature Page     I have received my discharge instructions, and my questions have been answered. I have discussed any challenges I see with this plan with the nurse or doctor.    ..........................................................................................................................................  Patient/Patient Representative Signature      ..........................................................................................................................................  Patient Representative Print Name and Relationship to Patient    ..................................................               ................................................  Date                                            Time    ..........................................................................................................................................  Reviewed by Signature/Title    ...................................................              ..............................................  Date                                                            Time

## 2018-05-25 NOTE — IP AVS SNAPSHOT
MRN:[id number]                      After Visit Summary   5/25/2018    Jefry Hatfield    MRN: [id number]           Thank you!     Thank you for choosing [place] for your care. Our goal is always to provide you with excellent care. Hearing back from our patients is one way we can continue to improve our services. Please take a few minutes to complete the written survey that you may receive in the mail after you visit with us. Thank you!        Patient Information     Date Of Birth          1950        About your hospital stay     You were admitted on:  May 25, 2018 You last received care in the:  [facility]    You were discharged on:  May 25, 2018       Who to Call     For medical emergencies, please call 911.  For non-urgent questions about your medical care, please call your primary care provider or clinic, [phone]  For questions related to your surgery, please call your surgery clinic        Attending Provider     Provider Specialty    Zak Kaplan MD Gastroenterology       Primary Care Provider Office Phone # Fax #    Daniela MD [place] [phone]      Your next 10 appointments already scheduled     Jun 11, 2018  8:40 AM CDT   AM[facility] Extremity with Blake Murray PT   [facility] ([facility])    [address] [place] [address] [address]th [facility] [address] [phone]            Jun 12, 2018  8:00 AM CDT   (Arrive by 7:45 AM)   New Renal Calculi with Milan Estrella MD   [facility] ([facility])    [address] [place] [address] [phone]              Pending Results     No orders found from 5/23/2018 to 5/26/2018.            Admission Information     Date & Time Provider Department Dept. Phone    5/25/2018 Zak Kaplan MD [facility]  [place] [phone]      Your Vitals Were     Blood Pressure Temperature Respirations Pulse Oximetry          127/85 98  F (36.7  C) (Temporal) 16 100%        [facility] Information     [employer] gives you secure access to your electronic health record. If you see a primary care provider, you can also send messages to your care team and make appointments. If you have questions, please call your primary care clinic.  If you do not have a primary care provider, please call [phone] and they will assist you.      [employer] is an electronic gateway that provides easy, online access to your medical records. With [employer], you can request a clinic appointment, read your test results, renew a prescription or communicate with your care team.     To access your existing account, please contact your [facility] or call [phone] for assistance.        Care EveryWhere ID     This is your Care EveryWhere ID. This could be used by other organizations to access your [place] medical records  [id number]        Equal Access to Services     UTE [place] : Hadodalis vierao Sodenise, waaxda luqadaha, qaybta kaalmada adefrancisca, danika diallo. So [facility] [phone].    ATENCIÓN: Si habla español, tiene a payan disposición servicios gratuitos de asistencia lingüística. Llame al [phone].    We comply with applicable federal civil rights laws and [place] laws. We do not discriminate on the basis of race, color, national origin, age, disability, sex, sexual orientation, or gender identity.               Review of your medicines      UNREVIEWED medicines. Ask your doctor about these medicines        Dose / Directions    CALCIUM-MAG-VIT C-VIT D PO        Take by mouth 2 times daily   Refills:  0       OMEGA-3 FISH OIL PO        Take by mouth daily   Refills:  0       oxyCODONE IR 5 MG tablet   Commonly known as:  ROXICODONE        Dose:  5 mg   Take 1 tablet (5 mg) by mouth  every 6 hours as needed for pain   Quantity:  15 tablet   Refills:  0       SAW PALMETTO EXTRACT PO        Take by mouth daily   Refills:  0       VITAMIN D (CHOLECALCIFEROL) PO        Take by mouth daily   Refills:  0                Protect others around you: Learn how to safely use, store and throw away your medicines at www.disposemymeds.org.             Medication List: This is a list of all your medications and when to take them. Check marks below indicate your daily home schedule. Keep this list as a reference.      Medications           Morning Afternoon Evening Bedtime As Needed    CALCIUM-MAG-VIT C-VIT D PO   Take by mouth 2 times daily                                OMEGA-3 FISH OIL PO   Take by mouth daily                                oxyCODONE IR 5 MG tablet   Commonly known as:  ROXICODONE   Take 1 tablet (5 mg) by mouth every 6 hours as needed for pain                                SAW PALMETTO EXTRACT PO   Take by mouth daily                                VITAMIN D (CHOLECALCIFEROL) PO   Take by mouth daily

## 2018-05-25 NOTE — OR NURSING
Colonoscopy completed and pt tolerated well with conscious sedation and on 2L nc oxygen.  Had to reposition from left side to back during procedure to facilitate scope positioning.

## 2018-06-11 ENCOUNTER — THERAPY VISIT (OUTPATIENT)
Dept: PHYSICAL THERAPY | Facility: CLINIC | Age: 68
End: 2018-06-11
Payer: COMMERCIAL

## 2018-06-11 DIAGNOSIS — M25.511 SHOULDER PAIN, RIGHT: ICD-10-CM

## 2018-06-11 PROCEDURE — 97112 NEUROMUSCULAR REEDUCATION: CPT | Mod: GP | Performed by: PHYSICAL THERAPIST

## 2018-06-11 PROCEDURE — 97110 THERAPEUTIC EXERCISES: CPT | Mod: GP | Performed by: PHYSICAL THERAPIST

## 2018-06-14 ENCOUNTER — MYC MEDICAL ADVICE (OUTPATIENT)
Dept: UROLOGY | Facility: CLINIC | Age: 68
End: 2018-06-14

## 2018-06-29 ENCOUNTER — OFFICE VISIT (OUTPATIENT)
Dept: OPTOMETRY | Facility: CLINIC | Age: 68
End: 2018-06-29

## 2018-06-29 DIAGNOSIS — H52.31 ANISOMETROPIA: Primary | ICD-10-CM

## 2018-06-29 DIAGNOSIS — H44.23 DEGENERATIVE MYOPIA, BILATERAL: ICD-10-CM

## 2018-06-29 DIAGNOSIS — Z96.1 PSEUDOPHAKIA: ICD-10-CM

## 2018-06-29 NOTE — MR AVS SNAPSHOT
After Visit Summary   6/29/2018    Jefry Hatfield    MRN: [id number]           Patient Information     Date Of Birth          1950        Visit Information        Provider Department      6/29/2018 8:00 AM [facility]        Today's Diagnoses     Anisometropia    -  1    Degenerative myopia, bilateral        Pseudophakia           Follow-ups after your visit        Your next 10 appointments already scheduled     Jul 09, 2018 10:40 AM CDT   AMADOU Extremity with Blake Murray PT   [facility] ([facility])    [address] [place] [address] [phone]              Who to contact     Please call your clinic at [phone] to:    Ask questions about your health    Make or cancel appointments    Discuss your medicines    Learn about your test results    Speak to your doctor            Additional Information About Your Visit        MyChart Information     "[employer]t gives you secure access to your electronic health record. If you see a primary care provider, you can also send messages to your care team and make appointments. If you have questions, please call your primary care clinic.  If you do not have a primary care provider, please call [phone] and they will assist you.      [employer] is an electronic gateway that provides easy, online access to your medical records. With [employer], you can request a clinic appointment, read your test results, renew a prescription or communicate with your care team.     To access your existing account, please contact your [facility] or call [phone] for assistance.        Care EveryWhere ID     This is your Care EveryWhere ID. This could be used by other organizations to access your [place] medical records  [id number]         Blood Pressure from Last 3 Encounters:   05/25/18 134/81   04/29/18 120/70   03/27/18  109/73    Weight from Last 3 Encounters:   05/14/18 79.4 kg (175 lb)   04/28/18 79.4 kg (175 lb)   04/02/18 78.9 kg (174 lb)              Today, you had the following     No orders found for display       Primary Care Provider Office Phone # Fax #    Daniela MD Dewey [phone]       [place] [address]        Equal Access to Services     [place] : Hadii aad ku hadasho Soomaali, waaxda luqadaha, qaybta kaalmada adeegyada, waxay idiin hayaan adeeg timothy lagilbert . So [facility] [phone].    ATENCIÓN: Si habla español, tiene a payan disposición servicios gratuitos de asistencia lingüística. [facility] [phone].    We comply with applicable federal civil rights laws and [place] laws. We do not discriminate on the basis of race, color, national origin, age, disability, sex, sexual orientation, or gender identity.            Thank you!     Thank you for choosing [facility]  for your care. Our goal is always to provide you with excellent care. Hearing back from our patients is one way we can continue to improve our services. Please take a few minutes to complete the written survey that you may receive in the mail after your visit with us. Thank you!             Your Updated Medication List - Protect others around you: Learn how to safely use, store and throw away your medicines at www.disposemymeds.org.          This list is accurate as of 6/29/18 11:59 PM.  Always use your most recent med list.                   Brand Name Dispense Instructions for use Diagnosis    CALCIUM-MAG-VIT C-VIT D PO      Take by mouth 2 times daily        OMEGA-3 FISH OIL PO      Take by mouth daily        oxyCODONE IR 5 MG tablet    ROXICODONE    15 tablet    Take 1 tablet (5 mg) by mouth every 6 hours as needed for pain        SAW PALMETTO EXTRACT PO      Take by mouth daily        VITAMIN D (CHOLECALCIFEROL) PO      Take by mouth daily

## 2018-07-02 ENCOUNTER — THERAPY VISIT (OUTPATIENT)
Dept: PHYSICAL THERAPY | Facility: CLINIC | Age: 68
End: 2018-07-02
Payer: COMMERCIAL

## 2018-07-02 DIAGNOSIS — M25.511 SHOULDER PAIN, RIGHT: ICD-10-CM

## 2018-07-02 PROCEDURE — 97112 NEUROMUSCULAR REEDUCATION: CPT | Mod: GP | Performed by: PHYSICAL THERAPIST

## 2018-07-02 PROCEDURE — 97110 THERAPEUTIC EXERCISES: CPT | Mod: GP | Performed by: PHYSICAL THERAPIST

## 2018-07-02 NOTE — PROGRESS NOTES
No office visit. CL order only.    Contact Lens Billing  V-Code:  - Soft spherical  Final Contact Lens Rx      Brand Base Curve Diameter Sphere Lens   Right Dailies Total 1 8.5 14.1 -12.00 pt preferred -12.00 over -11.00   Left Dailies Total 1 8.5 14.1 -1.50             # of units: 2 90-packs  Price per Unit: $95 per 90-pack    This patient requires contact lenses that are medically necessary for either improvement in vision over spectacles, support of the ocular surface, or other therapeutic benefit. These are not cosmetic contact lenses.  Significant anisometropia, degenerative myopia    Encounter Diagnoses   Name Primary?     Anisometropia Yes     Degenerative myopia, bilateral      Pseudophakia         Date of last eye exam: 1/22/18

## 2018-07-02 NOTE — MR AVS SNAPSHOT
After Visit Summary   7/2/2018    Jefry Hatfield    MRN: [id number]           Patient Information     Date Of Birth          1950        Visit Information        Provider Department      7/2/2018 10:40 AM Blake Murray PT [facility]        Today's Diagnoses     Shoulder pain, right           Follow-ups after your visit        Your next 10 appointments already scheduled     Jul 09, 2018 10:40 AM CDT   AMADOU Extremity with CHRIS Douglas [facility] ([facility])    [address] [place] [address] [address]th [facility] [address] [phone]              Who to contact     If you have questions or need follow up information about today's clinic visit or your schedule please contact [facility] directly at [phone].  Normal or non-critical lab and imaging results will be communicated to you by [employer], letter or phone within 4 business days after the clinic has received the results. If you do not hear from us within 7 days, please contact the clinic through [employer] or phone. If you have a critical or abnormal lab result, we will notify you by phone as soon as possible.  Submit refill requests through [employer] or call your pharmacy and they will forward the refill request to us. Please allow 3 business days for your refill to be completed.          Additional Information About Your Visit        MyChart Information     [employer] gives you secure access to your electronic health record. If you see a primary care provider, you can also send messages to your care team and make appointments. If you have questions, please call your primary care clinic.  If you do not have a primary care provider, please call [phone] and they will assist you.        Care EveryWhere ID     This is your Care EveryWhere ID. This could be used by other organizations to access your [place] medical records  [id number]          Blood Pressure from Last 3 Encounters:   05/25/18 134/81   04/29/18 120/70   03/27/18 109/73    Weight from Last 3 Encounters:   05/14/18 79.4 kg (175 lb)   04/28/18 79.4 kg (175 lb)   04/02/18 78.9 kg (174 lb)              We Performed the Following     NEUROMUSCULAR RE-EDUCATION     THERAPEUTIC EXERCISES        Primary Care Provider Office Phone # Fax #    Momarylinsimeon Palomo [phone]       [address] [address]        Equal Access to Services     [facility]: Hadii aad ku hadasho Soomaali, waaxda luqadaha, qaybta kaalmada adechacortayaclari, danika diallo. So [facility] [phone].    ATENCIÓN: Si habla español, tiene a payan disposición servicios gratuitos de asistencia lingüística. [facility] [phone].    We comply with applicable federal civil rights laws and [place] laws. We do not discriminate on the basis of race, color, national origin, age, disability, sex, sexual orientation, or gender identity.            Thank you!     Thank you for choosing [facility]  for your care. Our goal is always to provide you with excellent care. Hearing back from our patients is one way we can continue to improve our services. Please take a few minutes to complete the written survey that you may receive in the mail after your visit with us. Thank you!             Your Updated Medication List - Protect others around you: Learn how to safely use, store and throw away your medicines at www.disposemymeds.org.          This list is accurate as of 7/2/18 11:42 AM.  Always use your most recent med list.                   Brand Name Dispense Instructions for use Diagnosis    CALCIUM-MAG-VIT C-VIT D PO      Take by mouth 2 times daily        OMEGA-3 FISH OIL PO      Take by mouth daily        oxyCODONE IR 5 MG tablet    ROXICODONE    15 tablet    Take 1 tablet (5 mg) by mouth every 6 hours as needed for pain        SAW PALMETTO EXTRACT PO      Take  by mouth daily        VITAMIN D (CHOLECALCIFEROL) PO      Take by mouth daily

## 2018-07-09 ENCOUNTER — THERAPY VISIT (OUTPATIENT)
Dept: PHYSICAL THERAPY | Facility: CLINIC | Age: 68
End: 2018-07-09
Payer: COMMERCIAL

## 2018-07-09 DIAGNOSIS — M25.511 SHOULDER PAIN, RIGHT: ICD-10-CM

## 2018-07-09 PROCEDURE — 97110 THERAPEUTIC EXERCISES: CPT | Mod: GP | Performed by: PHYSICAL THERAPIST

## 2018-07-09 PROCEDURE — 97112 NEUROMUSCULAR REEDUCATION: CPT | Mod: GP | Performed by: PHYSICAL THERAPIST

## 2018-07-26 ENCOUNTER — OFFICE VISIT (OUTPATIENT)
Dept: DERMATOLOGY | Facility: CLINIC | Age: 68
End: 2018-07-26
Payer: COMMERCIAL

## 2018-07-26 DIAGNOSIS — K11.7 XEROSTOMIA: Primary | ICD-10-CM

## 2018-07-26 RX ORDER — NORTRIPTYLINE HCL 25 MG
100 CAPSULE ORAL DAILY
Refills: 2 | COMMUNITY
Start: 2018-06-25

## 2018-07-26 ASSESSMENT — PAIN SCALES - GENERAL: PAINLEVEL: NO PAIN (0)

## 2018-07-26 NOTE — NURSING NOTE
Dermatology Rooming Note    Jefry Hatfield's goals for this visit include:   Chief Complaint   Patient presents with     Derm Problem     Jefry is here regarding concerns with dry mouth related to medication      Debbie Scott LPN

## 2018-07-26 NOTE — PROGRESS NOTES
[facility] Dermatology Note      Dermatology Problem List:  1. Sebaceous hyperplasia  2. Possible onychomycosis vs trauma, R 1st/2nd toenails, with likely ACD to topical tea aren oil vs triple abx    Encounter Date: Jul 26, 2018    CC:   Chief Complaint   Patient presents with     Derm Problem     eJfry is here regarding concerns with dry mouth related to medication          History of Present Illness:  Mr. Jefry Hatfield is a 67 year old male who present for eval of burning and dryness of mouth. Last seen in January 2017 for ACD, seb hyp, and onychomycosis. Symptoms started about 3 weeks ago. Feels it all over but feels most on the hard palate. Occasionally gets choking sensation. He believes his mouth symptoms are related to nortriptyline use which he has been taking for 2.5 weeks. Has been using ACT mouthwash which has been helping. Biotene mouthwash did not help. Has been drinking plenty of water. Cannot eat spicy foods anymore since it burns. No ulcers in the mouth. Would like to try swishing coconut oil as recommended by his chiropracter.       Past Medical History:   Patient Active Problem List   Diagnosis     Vision changes     Senile nuclear sclerosis     Malignant myopia     Pseudophakia of both eyes - Both Eyes     Myopic degeneration     Anisometropia     Cervicalgia     Cervical radiculopathy     Shoulder pain, right     Past Medical History:   Diagnosis Date     Anisometropia      Cataract, right eye      High myopia      Myopic degeneration      Posterior staphyloma     LE     Pseudophakia of left eye      Retinal detachment RE 1971,[id number]    BE     Past Surgical History:   Procedure Laterality Date     APPENDECTOMY       CATARACT IOL, RT/LT Bilateral 11/14/12LE 7/1/14RE    BE     COLONOSCOPY N/A 5/25/2018    Procedure: COLONOSCOPY;  Screening Colonoscopy;  Surgeon: Zak Kaplan MD;  [facility]     PHACOEMULSIFICATION CLEAR CORNEA WITH STANDARD INTRAOCULAR  LENS IMPLANT  7/1/2014    Procedure: PHACOEMULSIFICATION CLEAR CORNEA WITH STANDARD INTRAOCULAR LENS IMPLANT;  Surgeon: Milan Bernardo MD;  Location:  [facility]     SCLERAL BUCKLE  1971    RE     SCLERAL BUCKLE  1980    LE     YAG CAPSULOTOMY OD (RIGHT EYE)  10/30/2014       Medications:  Current Outpatient Prescriptions   Medication Sig Dispense Refill     CALCIUM-MAG-VIT C-VIT D PO Take by mouth 2 times daily       nortriptyline (PAMELOR) 25 MG capsule   2     Omega-3 Fatty Acids (OMEGA-3 FISH OIL PO) Take by mouth daily       Saw Palmetto, Serenoa repens, (SAW PALMETTO EXTRACT PO) Take by mouth daily       VITAMIN D, CHOLECALCIFEROL, PO Take by mouth daily       oxyCODONE IR (ROXICODONE) 5 MG tablet Take 1 tablet (5 mg) by mouth every 6 hours as needed for pain (Patient not taking: Reported on 5/14/2018) 15 tablet 0        Allergies   Allergen Reactions     Ranitidine Unknown         Review of Systems:  General: No recent infections, fevers, chills, malaise, arthralgias, unexplained weight/appetite changes  Skin: No new/changing moles, nothing bleeding/nonhealing/painful/tender/rapidly-growing.  Lymphatic: No subcutaneous lumps/bumps.      Physical exam:  Vitals: There were no vitals taken for this visit.  GEN: Well-appearing male, no discomfort or distress, cooperative with the exam  SKIN: Focused exam of the mouth.  - No oral ulcers or erythema  - No other lesions of concern on areas examined.     Impression/Plan:  1. Xerostomia: likely related to nortriptyline use   - recommended that he discuss nortriptyline dosing with his psychiatrist; patient states that dose will likely decrease over time as he is currently having a more severe anxiety episode  - discussed that he should stay hydrated, continue using alcohol-free mouthwash, chewing xylitol gum  - provided handout regarding combating dry mouth symptoms  - instructed to let us know if symptoms worsen    Follow-up as needed    Dr. Moore staffed the  patient.     Staff Involved:  Resident(Mariam Feldman)/Staff(as above)    Mariam Feldman M.D.  PGY-3 Resident  Dermatology  [facility]    I have seen and examined this patient and agree with the assessment and plan as documented in the resident's note.    Semaj Moore MD  Dermatology Attending

## 2018-07-26 NOTE — PATIENT INSTRUCTIONS
The best way to treat dry mouth -- known medically as xerostomia (nsix-l-TCSU-me-uh) -- depends on what's causing it. You can do some things to relieve dry mouth temporarily. But for the best long-term dry mouth remedy, you need to address its cause.    To relieve your dry mouth:    -Chew sugar-free gum or suck on sugar-free hard candies to stimulate the flow of saliva. For some people, xylitol, which is often found in sugar-free gum or sugar-free candies, may cause diarrhea or cramps if consumed in large amounts.  -Limit your caffeine intake because caffeine can make your mouth drier.  -Don't use mouthwashes that contain alcohol because they can be drying.  -Stop all tobacco use if you smoke or chew tobacco.  -Sip water regularly.  -Try over-the-counter saliva substitutes -- look for products containing xylitol, such as Mouth Kote or Oasis Moisturizing Mouth Spray, or ones containing carboxymethylcellulose (kahr-bok-see-meth-ul-TONY-u-lohs) or hydroxyethyl cellulose (hi-drok-see-ETH-ul TONY-u-lohs), such as Biotene Oral Balance.  -Try a mouthwash designed for dry mouth -- especially one that contains xylitol, such as Biotene Dry Mouth Oral Rinse or ACT Total Care Dry Mouth Mouthwash, which also offer protection against tooth decay.  -Avoid using over-the-counter antihistamines and decongestants because they can make your symptoms worse.  -Breathe through your nose, not your mouth.  -Add moisture to the air at night with a room humidifier.  -Saliva is important to maintain the health of your teeth and mouth. If you frequently have a dry mouth, taking these steps to protect your oral health may also help your condition:    -Avoid sugary or acidic foods and drinks because they increase your risk of tooth decay.  -Brush with a fluoride toothpaste -- ask your dentist if you might benefit from prescription fluoride toothpaste.  -Use a fluoride rinse or brush-on fluoride gel before bedtime. Occasionally a custom-fit  fluoride applicator (made by your dentist) can make this more effective.  -Visit your dentist at least twice yearly to detect and treat tooth decay or other dental problems.    https://www.[facility].org/diseases-conditions/dry-mouth/expert-answers/dry-mouth/faq-83812322

## 2018-07-26 NOTE — MR AVS SNAPSHOT
After Visit Summary   7/26/2018    Jefry Hatfield    MRN: [id number]           Patient Information     Date Of Birth          1950        Visit Information        Provider Department      7/26/2018 8:40 AM Mariam Feldman MD [facility] Dermatology        Care Instructions    The best way to treat dry mouth -- known medically as xerostomia (clmx-t-XFVC-me-uh) -- depends on what's causing it. You can do some things to relieve dry mouth temporarily. But for the best long-term dry mouth remedy, you need to address its cause.    To relieve your dry mouth:    -Chew sugar-free gum or suck on sugar-free hard candies to stimulate the flow of saliva. For some people, xylitol, which is often found in sugar-free gum or sugar-free candies, may cause diarrhea or cramps if consumed in large amounts.  -Limit your caffeine intake because caffeine can make your mouth drier.  -Don't use mouthwashes that contain alcohol because they can be drying.  -Stop all tobacco use if you smoke or chew tobacco.  -Sip water regularly.  -Try over-the-counter saliva substitutes -- look for products containing xylitol, such as Mouth Kote or Oasis Moisturizing Mouth Spray, or ones containing carboxymethylcellulose (kahr-bok-see-meth-ul-TONY-u-lohs) or hydroxyethyl cellulose (hi-drok-see-ETH-ul TONY-u-lohs), such as Biotene Oral Balance.  -Try a mouthwash designed for dry mouth -- especially one that contains xylitol, such as Biotene Dry Mouth Oral Rinse or ACT Total Care Dry Mouth Mouthwash, which also offer protection against tooth decay.  -Avoid using over-the-counter antihistamines and decongestants because they can make your symptoms worse.  -Breathe through your nose, not your mouth.  -Add moisture to the air at night with a room humidifier.  -Saliva is important to maintain the health of your teeth and mouth. If you frequently have a dry mouth, taking these steps to protect your oral health may also help your  condition:    -Avoid sugary or acidic foods and drinks because they increase your risk of tooth decay.  -Brush with a fluoride toothpaste -- ask your dentist if you might benefit from prescription fluoride toothpaste.  -Use a fluoride rinse or brush-on fluoride gel before bedtime. Occasionally a custom-fit fluoride applicator (made by your dentist) can make this more effective.  -Visit your dentist at least twice yearly to detect and treat tooth decay or other dental problems.    [email or web address]          Follow-ups after your visit        Your next 10 appointments already scheduled     Jul 30, 2018 10:40 AM CDT   [facility] Extremity with CHRIS [facility] Physical Therapy [facility] ([facility])    [address] [place] [address] [phone]              Who to contact     Please call your clinic at [phone] to:    Ask questions about your health    Make or cancel appointments    Discuss your medicines    Learn about your test results    Speak to your doctor            Additional Information About Your Visit        [employer] Information     [employer] gives you secure access to your electronic health record. If you see a primary care provider, you can also send messages to your care team and make appointments. If you have questions, please call your primary care clinic.  If you do not have a primary care provider, please call [phone] and they will assist you.      [employer] is an electronic gateway that provides easy, online access to your medical records. With [employer], you can request a clinic appointment, read your test results, renew a prescription or communicate with your care team.     To access your existing account, please contact your [facility] or call [phone] for assistance.        Care EveryWhere ID     This is your Care  EveryWhere ID. This could be used by other organizations to access your [place] medical records  [id number]         Blood Pressure from Last 3 Encounters:   05/25/18 134/81   04/29/18 120/70   03/27/18 109/73    Weight from Last 3 Encounters:   05/14/18 79.4 kg (175 lb)   04/28/18 79.4 kg (175 lb)   04/02/18 78.9 kg (174 lb)              Today, you had the following     No orders found for display       Primary Care Provider Office Phone # Fax #    Daniela Palomo [phone]       [address] [address]        Equal Access to Services     UTE BOSTON : Megan Desai, oswaldo daly, qaliv kaalmada jesus, danika diallo. So [facility] [phone].    ATENCIÓN: Si habla español, tiene a payan disposición servicios gratuitos de asistencia lingüística. Llame al [phone].    We comply with applicable federal civil rights laws and [place] laws. We do not discriminate on the basis of race, color, national origin, age, disability, sex, sexual orientation, or gender identity.            Thank you!     Thank you for choosing [facility] DERMATOLOGY  for your care. Our goal is always to provide you with excellent care. Hearing back from our patients is one way we can continue to improve our services. Please take a few minutes to complete the written survey that you may receive in the mail after your visit with us. Thank you!             Your Updated Medication List - Protect others around you: Learn how to safely use, store and throw away your medicines at www.disposemymeds.org.          This list is accurate as of 7/26/18  9:42 AM.  Always use your most recent med list.                   Brand Name Dispense Instructions for use Diagnosis    CALCIUM-MAG-VIT C-VIT D PO      Take by mouth 2 times daily        nortriptyline 25 MG capsule    PAMELOR          OMEGA-3 FISH OIL PO      Take by mouth daily        oxyCODONE IR 5 MG tablet     ROXICODONE    15 tablet    Take 1 tablet (5 mg) by mouth every 6 hours as needed for pain        SAW PALMETTO EXTRACT PO      Take by mouth daily        VITAMIN D (CHOLECALCIFEROL) PO      Take by mouth daily

## 2018-07-26 NOTE — LETTER
7/26/2018       RE: Jefry Hatfield  [address]  [place] [address]     Dear Colleague,    Thank you for referring your patient, Jefry Hatfield, to the [facility] DERMATOLOGY at [facility]. Please see a copy of my visit note below.    [facility] Dermatology Note      Dermatology Problem List:  1. Sebaceous hyperplasia  2. Possible onychomycosis vs trauma, R 1st/2nd toenails, with likely ACD to topical tea aren oil vs triple abx    Encounter Date: Jul 26, 2018    CC:   Chief Complaint   Patient presents with     Derm Problem     Jefry is here regarding concerns with dry mouth related to medication          History of Present Illness:  Mr. Jefry Hatfield is a 67 year old male who present for eval of burning and dryness of mouth. Last seen in January 2017 for ACD, seb hyp, and onychomycosis. Symptoms started about 3 weeks ago. Feels it all over but feels most on the hard palate. Occasionally gets choking sensation. He believes his mouth symptoms are related to nortriptyline use which he has been taking for 2.5 weeks. Has been using ACT mouthwash which has been helping. Biotene mouthwash did not help. Has been drinking plenty of water. Cannot eat spicy foods anymore since it burns. No ulcers in the mouth. Would like to try swishing coconut oil as recommended by his chiropracter.       Past Medical History:   Patient Active Problem List   Diagnosis     Vision changes     Senile nuclear sclerosis     Malignant myopia     Pseudophakia of both eyes - Both Eyes     Myopic degeneration     Anisometropia     Cervicalgia     Cervical radiculopathy     Shoulder pain, right     Past Medical History:   Diagnosis Date     Anisometropia      Cataract, right eye      High myopia      Myopic degeneration      Posterior staphyloma     LE     Pseudophakia of left eye      Retinal detachment RE 1971,[id number]    BE     Past Surgical History:    Procedure Laterality Date     APPENDECTOMY       CATARACT IOL, RT/LT Bilateral 11/14/12LE 7/1/14RE    BE     COLONOSCOPY N/A 5/25/2018    Procedure: COLONOSCOPY;  Screening Colonoscopy;  Surgeon: Zak Kaplan MD;  Location: [facility] OR     PHACOEMULSIFICATION CLEAR CORNEA WITH STANDARD INTRAOCULAR LENS IMPLANT  7/1/2014    Procedure: PHACOEMULSIFICATION CLEAR CORNEA WITH STANDARD INTRAOCULAR LENS IMPLANT;  Surgeon: Milan Bernardo MD;  Location:  [facility]     SCLERAL BUCKLE  1971    RE     SCLERAL BUCKLE  1980    LE     YAG CAPSULOTOMY OD (RIGHT EYE)  10/30/2014       Medications:  Current Outpatient Prescriptions   Medication Sig Dispense Refill     CALCIUM-MAG-VIT C-VIT D PO Take by mouth 2 times daily       nortriptyline (PAMELOR) 25 MG capsule   2     Omega-3 Fatty Acids (OMEGA-3 FISH OIL PO) Take by mouth daily       Saw Palmetto, Serenoa repens, (SAW PALMETTO EXTRACT PO) Take by mouth daily       VITAMIN D, CHOLECALCIFEROL, PO Take by mouth daily       oxyCODONE IR (ROXICODONE) 5 MG tablet Take 1 tablet (5 mg) by mouth every 6 hours as needed for pain (Patient not taking: Reported on 5/14/2018) 15 tablet 0        Allergies   Allergen Reactions     Ranitidine Unknown         Review of Systems:  General: No recent infections, fevers, chills, malaise, arthralgias, unexplained weight/appetite changes  Skin: No new/changing moles, nothing bleeding/nonhealing/painful/tender/rapidly-growing.  Lymphatic: No subcutaneous lumps/bumps.      Physical exam:  Vitals: There were no vitals taken for this visit.  GEN: Well-appearing male, no discomfort or distress, cooperative with the exam  SKIN: Focused exam of the mouth.  - No oral ulcers or erythema  - No other lesions of concern on areas examined.     Impression/Plan:  1. 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 Moore staffed the patient.     Staff Involved:  Resident(Mariam Feldman)/Staff(as above)    Mariam Feldman M.D.  PGY-3 Resident  Dermatology  [facility]    I have seen and examined this patient and agree with the assessment and plan as documented in the resident's note.    Semaj Moore MD  Dermatology Attending      Again, thank you for allowing me to participate in the care of your patient.      Sincerely,    Mariam Feldman MD

## 2018-07-30 ENCOUNTER — THERAPY VISIT (OUTPATIENT)
Dept: PHYSICAL THERAPY | Facility: CLINIC | Age: 68
End: 2018-07-30
Payer: COMMERCIAL

## 2018-07-30 DIAGNOSIS — M25.511 SHOULDER PAIN, RIGHT: ICD-10-CM

## 2018-07-30 PROCEDURE — 97110 THERAPEUTIC EXERCISES: CPT | Mod: GP | Performed by: PHYSICAL THERAPIST

## 2018-07-30 PROCEDURE — 97112 NEUROMUSCULAR REEDUCATION: CPT | Mod: GP | Performed by: PHYSICAL THERAPIST

## 2018-08-05 PROBLEM — K11.7 XEROSTOMIA: Status: ACTIVE | Noted: 2018-08-05

## 2018-08-27 ENCOUNTER — THERAPY VISIT (OUTPATIENT)
Dept: PHYSICAL THERAPY | Facility: CLINIC | Age: 68
End: 2018-08-27
Payer: COMMERCIAL

## 2018-08-27 DIAGNOSIS — M25.511 SHOULDER PAIN, RIGHT: ICD-10-CM

## 2018-08-27 PROCEDURE — 97110 THERAPEUTIC EXERCISES: CPT | Mod: GP | Performed by: PHYSICAL THERAPIST

## 2018-08-27 PROCEDURE — 97112 NEUROMUSCULAR REEDUCATION: CPT | Mod: GP | Performed by: PHYSICAL THERAPIST

## 2018-10-02 ENCOUNTER — OFFICE VISIT (OUTPATIENT)
Dept: INTERNAL MEDICINE | Facility: CLINIC | Age: 68
End: 2018-10-02
Payer: COMMERCIAL

## 2018-10-02 VITALS — RESPIRATION RATE: 16 BRPM | SYSTOLIC BLOOD PRESSURE: 127 MMHG | DIASTOLIC BLOOD PRESSURE: 85 MMHG | HEART RATE: 90 BPM

## 2018-10-02 DIAGNOSIS — Z00.00 HEALTHCARE MAINTENANCE: Primary | ICD-10-CM

## 2018-10-02 DIAGNOSIS — H93.293 MISOPHONIA: ICD-10-CM

## 2018-10-02 DIAGNOSIS — H91.90 HEARING LOSS: Primary | ICD-10-CM

## 2018-10-02 ASSESSMENT — PAIN SCALES - GENERAL: PAINLEVEL: NO PAIN (0)

## 2018-10-02 NOTE — NURSING NOTE
Chief Complaint   Patient presents with     Ear Problem     Pt is here to discuss ear issues        Sunshine Whitaker CMA at 8:16 AM on 10/2/2018

## 2018-10-02 NOTE — MR AVS SNAPSHOT
After Visit Summary   10/2/2018    Jefry Hatfield    MRN: [id number]           Patient Information     Date Of Birth          1950        Visit Information        Provider Department      10/2/2018 7:55 AM Brooke Gonzalez MD [facility] Primary Care Clinic        Today's Diagnoses     Healthcare maintenance    -  1    Misophonia          Care Instructions    Primary Care Center Phone Number [phone]  Primary Care Center Medication Refill Request Information:  * Please contact your pharmacy regarding ANY request for medication refills.  ** [facility] Prescription Fax = [phone]  * Please allow 3 business days for routine medication refills.  * Please allow 5 business days for controlled substance medication refills.     Primary Care Center Test Result notification information:  *You will be notified with in 7-10 days of your appointment day regarding the results of your test.  If you are on MyChart you will be notified as soon as the provider has reviewed the results and signed off on them.                 [facility]         Internal Medicine Resident                   Continuity Clinic    Who We Are    Resident Continuity Clinic is a part of the [facility] Primary Care Clinic.  Resident physicians see patients independently and establish a relationship with them over the course of their three-year residency program.  As with the Primary Care Clinic, our Resident Continuity Clinic models a group practice.  If your doctor is not available, you will be able to see another resident physician.  At the end of a resident s training, patients will be transitioned to a new resident physician for ongoing care.     We treat patients with a wide array of medical needs from routine physicals, to acute illnesses, to diabetes and blood pressure management, to complex medical illness.  What is a Resident Physician?    Resident physicians hold medical degrees and are doctors. They  are training to become specialists in Internal Medicine. They work under the supervision of board-certified faculty physicians.  Expectations for Your Care    We strive to provide accessible, quality care at all times.    In order to provide this care, it is best to see your primary care resident doctor consistently rather switching between providers.  In the event you do see another physician, you should schedule a follow-up visit with your usual primary care doctor.    If you are transitioning your care from another clinic, it is helpful to have your records available for your doctor to review.    We do not prescribe controlled substances, such as ADD medications or narcotic pain medications, on your first visit.  We will review your health records and concerns prior to devising a treatment plan with you in order to provide the best care.      Clinic Services     Extended clinic hours; patient  to help navigate your visit;  parking; laboratory and imaging services with evening and weekend hours    Multiple medical and surgical specialties in one building    Complementary services, including Nutrition, Integrative Medicine, Pharmacy consultations, Mental and Behavioral Health, Sports Medicine and Physical Therapy    Thank You    We would like to thank you for choosing the [facility] Internal Medicine Resident Continuity Clinic for your primary care. You are making a priceless contribution to the training of the next generation of health care practitioners.     Contact us at [phone] for appointments or questions.    Resident Clinic Hours are Tuesdays and Thursdays, 7:30am-5:00pm    Residents  Sunshine Gupta MD   (Female )   Hazel Gabriel MD   (Female)   Eitan Randolph MD  (Male)   Jesus Ellis MD  (Male)   Quin Santos MD   (Female)   Mauro Walls MD  (Male)    Emery Franco MD  (Male)   Josef Child MD  (Male)   Jesus Ferro MD (Male)   Den Ramírez  MD  (Male)   Dania Alan MD (Female)    Alysia Spencer MD (Female)   Dave Palomo MD  (Male)   Gwen Espana MD(Female)   Amber Nicole MD  (Female)    Supervising Physicians   MD Delia Browne, MD Joshua Way, MD Parish Lanier, MD Marycarmen Rose MD Charles Moldow, MD Heather Thompson Buum, MD Aleena Das MD                    Follow-ups after your visit        Additional Services     AUDIOLOGY ADULT REFERRAL       Your provider has referred you to: [facility]: Audiology and Aural Rehab Services - [place] [phone]   [email or web address]    Specialty Testing:  Audiogram w/Tymps and Reflexes (Comprehensive Audiology Evaluation)    Evaluation for:  - Hearing loss  - Sensitivity/ intolerance of sounds                  Follow-up notes from your care team     Discussed this visit Return in about 6 months (around 4/2/2019), or if symptoms worsen or fail to improve, for Physical Exam.      Your next 10 appointments already scheduled     Oct 05, 2018  9:00 AM CDT   Walk In From ENT with Bekah Edgar   [facility] Audiology ([facility])    [address] [place] [id number] [phone]            Oct 05, 2018 10:00 AM CDT   (Arrive by 9:45 AM)   NEW NEUROTOLOGY VISIT with Miguel Choi MD   [facility] Nose and Throat ([facility])    [address] [place] [id number] [phone]            Oct 22, 2018 10:40 AM CDT   AMADOU Extremity with Blake Murray PT   [facility] Physical Therapy [facility] ([facility])    [address] [place] [id number] [phone]            Mar 12, 2019  7:55 AM CDT   (Arrive by 7:40 AM)   PHYSICAL with Brooke Gonzalez MD   [facility] ([facility]  Surgery Center)    [address]  4th Floor  [facility] [address] [phone]              Who to contact     Please call your clinic at [phone] to:    Ask questions about your health    Make or cancel appointments    Discuss your medicines    Learn about your test results    Speak to your doctor            Additional Information About Your Visit        MyChart Information     [employer] gives you secure access to your electronic health record. If you see a primary care provider, you can also send messages to your care team and make appointments. If you have questions, please call your primary care clinic.  If you do not have a primary care provider, please call [phone] and they will assist you.      [employer] is an electronic gateway that provides easy, online access to your medical records. With [employer], you can request a clinic appointment, read your test results, renew a prescription or communicate with your care team.     To access your existing account, please contact your [facility] or call [phone] for assistance.        Care EveryWhere ID     This is your Care EveryWhere ID. This could be used by other organizations to access your [place] medical records  [id number]        Your Vitals Were     Pulse Respirations                90 16           Blood Pressure from Last 3 Encounters:   10/02/18 127/85   05/25/18 134/81   04/29/18 120/70    Weight from Last 3 Encounters:   05/14/18 79.4 kg (175 lb)   04/28/18 79.4 kg (175 lb)   04/02/18 78.9 kg (174 lb)              We Performed the Following     AUDIOLOGY ADULT REFERRAL     ZOSTER VACCINE RECOMBINANT ADJUVANTED IM NJX        Primary Care Provider Office Phone # Fax #    Brooke Gonzalez [phone]       [address] [place] [address]  [facility] [address]        Equal Access to Services     UTE BOSTON AH: oswaldo Conley qaybta kaalmada  danika leechacorta gretario henryaan ah. So [facility] [phone].    ATENCIÓN: Si habla thu, tiene a payan disposición servicios gratuitos de asistencia lingüística. Gael al [phone].    We comply with applicable federal civil rights laws and [place] laws. We do not discriminate on the basis of race, color, national origin, age, disability, sex, sexual orientation, or gender identity.            Thank you!     Thank you for choosing [facility] PRIMARY CARE CLINIC  for your care. Our goal is always to provide you with excellent care. Hearing back from our patients is one way we can continue to improve our services. Please take a few minutes to complete the written survey that you may receive in the mail after your visit with us. Thank you!             Your Updated Medication List - Protect others around you: Learn how to safely use, store and throw away your medicines at www.disposemymeds.org.          This list is accurate as of 10/2/18  9:53 AM.  Always use your most recent med list.                   Brand Name Dispense Instructions for use Diagnosis    CALCIUM-MAG-VIT C-VIT D PO      Take by mouth 2 times daily        nortriptyline 25 MG capsule    PAMELOR          OMEGA-3 FISH OIL PO      Take by mouth daily        oxyCODONE IR 5 MG tablet    ROXICODONE    15 tablet    Take 1 tablet (5 mg) by mouth every 6 hours as needed for pain        SAW PALMETTO EXTRACT PO      Take by mouth daily        VITAMIN D (CHOLECALCIFEROL) PO      Take by mouth daily

## 2018-10-02 NOTE — PATIENT INSTRUCTIONS
Primary Care Center Phone Number [phone]  Primary Care Center Medication Refill Request Information:  * Please contact your pharmacy regarding ANY request for medication refills.  ** [facility] Prescription Fax = [phone]  * Please allow 3 business days for routine medication refills.  * Please allow 5 business days for controlled substance medication refills.     Primary Care Center Test Result notification information:  *You will be notified with in 7-10 days of your appointment day regarding the results of your test.  If you are on MyChart you will be notified as soon as the provider has reviewed the results and signed off on them.                 [facility]         Internal Medicine Resident                   Continuity Clinic    Who We Are    Resident Continuity Clinic is a part of the [facility] Primary Care Clinic.  Resident physicians see patients independently and establish a relationship with them over the course of their three-year residency program.  As with the Primary Care Clinic, our Resident Continuity Clinic models a group practice.  If your doctor is not available, you will be able to see another resident physician.  At the end of a resident s training, patients will be transitioned to a new resident physician for ongoing care.     We treat patients with a wide array of medical needs from routine physicals, to acute illnesses, to diabetes and blood pressure management, to complex medical illness.  What is a Resident Physician?    Resident physicians hold medical degrees and are doctors. They are training to become specialists in Internal Medicine. They work under the supervision of board-certified faculty physicians.  Expectations for Your Care    We strive to provide accessible, quality care at all times.    In order to provide this care, it is best to see your primary care resident doctor consistently rather switching between providers.  In the event you do see another physician, you  should schedule a follow-up visit with your usual primary care doctor.    If you are transitioning your care from another clinic, it is helpful to have your records available for your doctor to review.    We do not prescribe controlled substances, such as ADD medications or narcotic pain medications, on your first visit.  We will review your health records and concerns prior to devising a treatment plan with you in order to provide the best care.      Clinic Services     Extended clinic hours; patient  to help navigate your visit;  parking; laboratory and imaging services with evening and weekend hours    Multiple medical and surgical specialties in one building    Complementary services, including Nutrition, Integrative Medicine, Pharmacy consultations, Mental and Behavioral Health, Sports Medicine and Physical Therapy    Thank You    We would like to thank you for choosing the [facility] Internal Medicine Resident Continuity Clinic for your primary care. You are making a priceless contribution to the training of the next generation of health care practitioners.     Contact us at [phone] for appointments or questions.    Resident Clinic Hours are Tuesdays and Thursdays, 7:30am-5:00pm    Residents  Sunshine Gupta MD   (Female )   Hazel Gabriel MD   (Female)   Eitan Randolph MD  (Male)   Jesus Ellis MD  (Male)   Quin Santos MD   (Female)   Mauro Walls MD  (Male)    Emery Franco MD  (Male)   Josef Child MD  (Male)   Jesus Frero MD (Male)   Den Ramírez MD  (Male)   Dania Alan MD (Female)    Alysia Spencer MD (Female)   Dave Palomo MD  (Male)   Gwen Espana MD(Female)   Amber Nicole MD  (Female)    Supervising Physicians   MD Delia Browne MD Briar Duffy, MD James Langland, MD Mary Logeais, MD Tanya Melnik, MD Charles Moldow, MD Heather Thompson Buum, MD Kathleen Watson, MD

## 2018-10-02 NOTE — NURSING NOTE
Shingrix vaccine given. Patient stated they would like vaccine today without checking with insurance for verification of coverage. Pt aware of possible high charge if insurance does not cover the vaccine. Pt stated to proceed with injection

## 2018-10-03 NOTE — PROGRESS NOTES
PRIMARY CARE CENTER         HPI:       Jefry Hatfield is a 67 year old male who presents with chief complaint of noise sensitivity.     Patient has a history of anxiety, depression, and cervical radiculopathy (follows with sports medicine). 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 Mr. 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       Mr. Hatfield states that he has had vision problems for most of his life.  He states that he has had crossed eyes since being a child, but this has never caused him problems. On chart review, notes state that he has had a chronic bilateral sixth nerve palsy as well as 20/200 visual acuity.  He reports that his right eye has always been the stronger, more dominant eye, but now since undergoing a cataract surgery in 11/2012 his left eye is now dominant and he is still adjusting to this.  Notably, Mr. Hatfield denies any history of vertigo, balance issues, ataxia or incoordination.  On the contrary, he feels his balance is quite good; he is very active and exercises regularly. Swims for half an hour a week.  He also does [employer] and rowing for 45 minutes twice a week. He also walks 2 miles daily to and from work. He follows a healthy diet. He is , lives with wife, who is retired and made this appointment for him.     He has a history of sensory sensitivities going back to 2003. These include photophobia, sensitivities to smell, and intolerance of certain constant low-pitched noises such as ceiling fan, air conditioning, hum of mechanical devices, etc. 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 At this time, it was thought that his visual problems were secondary to his myopia and none of his symptoms are related to the arachnoid cyst.     He believes that the symptoms of sensory sensitivities are related to his underlying depression and anxiety, as they get worse before the onset of an episode of anxiety or depression. He sees an outside psychiatrist. His symptoms are managed with nortriptyline. Both he and his wife deny noticing any signs of hearing loss. He requests referral to an audiologist.       Problem, Medication and Allergy Lists were reviewed and are current.  Patient is an established patient of this clinic.         Review of Systems:   ROS  I have personally reviewed and updated the complete ROS on the day of the visit.           Physical Exam:   /85  Pulse 90  Resp 16  There is no height or weight on file to calculate BMI.  Vitals were reviewed       GENERAL APPEARANCE: healthy, alert and no distress     EYES: EOMI,  PERRL     HENT: ear canals and TM's normal and nose and mouth without ulcers or lesions. Positive Wilson test (decreased sound in left ear); normal Rinne test     NECK: no adenopathy, no asymmetry, masses, or scars and thyroid normal to palpation     RESP: lungs clear to auscultation - no rales, rhonchi or wheezes     CV: regular rates and rhythm, normal S1 S2, no S3 or S4 and no murmur, click or rub     ABDOMEN:  soft, nontender, no HSM or masses and bowel sounds normal     MS: extremities normal- no gross deformities noted, no evidence of inflammation in joints, FROM in all extremities.     SKIN: no suspicious lesions or rashes     NEURO: Normal strength and tone, sensory exam grossly normal, mentation intact and speech normal     PSYCH: mentation appears normal. and affect normal/bright     LYMPHATICS: No cervical adenopathy        Results:      Results from the last 24 hoursNo results found for this or any previous visit (from the past 24  hour(s)).  Assessment and Plan     Jefry was seen today for ear problem.    Diagnoses and all orders for this visit:    # Healthcare maintenance  -     ZOSTER VACCINE RECOMBINANT ADJUVANTED IM NJX  -     Will RTC in 5-6 months for follow up and for 2nd dose Shingrix    Misophonia  Many years' history of noise intolerance, as above. Positive Wilson test on physical exam with decreased hearing in left ear. Rinne test normal. Will refer patient to audiology.   -     AUDIOLOGY ADULT REFERRAL        Options for treatment and follow-up care were reviewed with the patient. Jefry Hatfield engaged in the decision making process and verbalized understanding of the options discussed and agreed with the final plan.    Brooke Gonzalez MD MPH  Intern, Internal Medicine  Oct 2, 2018    Pt was seen and plan of care discussed with Dr. MARTIN Guerra MD.   Mr Hatfield was seen and examined with the resident Dr Gonzalez,I have reviewed her note and plan and I agree.  Macario Guerra MD

## 2018-10-05 ENCOUNTER — OFFICE VISIT (OUTPATIENT)
Dept: OTOLARYNGOLOGY | Facility: CLINIC | Age: 68
End: 2018-10-05
Payer: COMMERCIAL

## 2018-10-05 ENCOUNTER — OFFICE VISIT (OUTPATIENT)
Dept: AUDIOLOGY | Facility: CLINIC | Age: 68
End: 2018-10-05
Attending: INTERNAL MEDICINE
Payer: COMMERCIAL

## 2018-10-05 ENCOUNTER — RADIANT APPOINTMENT (OUTPATIENT)
Dept: CT IMAGING | Facility: CLINIC | Age: 68
End: 2018-10-05
Attending: OTOLARYNGOLOGY
Payer: COMMERCIAL

## 2018-10-05 VITALS — WEIGHT: 165 LBS | BODY MASS INDEX: 22.35 KG/M2 | HEIGHT: 72 IN

## 2018-10-05 DIAGNOSIS — R42 VERTIGO: ICD-10-CM

## 2018-10-05 DIAGNOSIS — H83.8X2 SUPERIOR SEMICIRCULAR CANAL DEHISCENCE OF LEFT EAR: Primary | ICD-10-CM

## 2018-10-05 DIAGNOSIS — H91.90 HEARING LOSS: ICD-10-CM

## 2018-10-05 DIAGNOSIS — H90.3 SENSORY HEARING LOSS, BILATERAL: Primary | ICD-10-CM

## 2018-10-05 ASSESSMENT — PAIN SCALES - GENERAL: PAINLEVEL: NO PAIN (0)

## 2018-10-05 NOTE — LETTER
10/5/2018      RE: Jefry Hatfield  [address]  [place] [address]       Reason for Visit:  Jefry Hatfield is seen in consultation from Dr. Rodrigues.      History of Present Illness: He is a 67-year-old male being seen for noise sensitivity.  The patient reports that he experiences an abnormal noise that when he is exposed to any kind of sound produces some degree of anxiety.  He describes a sense of pressure or fullness that can be associated.  He denies any imbalance or dizziness with any of the symptoms.  The patient has no unilaterality to it.    Past Medical History:   Diagnosis Date     Anisometropia      Cataract, right eye      High myopia      Myopic degeneration      Posterior staphyloma     LE     Pseudophakia of left eye      Retinal detachment RE 1971,[id number]    BE       Past Surgical History:   Procedure Laterality Date     APPENDECTOMY       CATARACT IOL, RT/LT Bilateral 11/14/12LE 7/1/14RE    BE     COLONOSCOPY N/A 5/25/2018    Procedure: COLONOSCOPY;  Screening Colonoscopy;  Surgeon: Zak Kaplan MD;  [facility]     PHACOEMULSIFICATION CLEAR CORNEA WITH STANDARD INTRAOCULAR LENS IMPLANT  7/1/2014    Procedure: PHACOEMULSIFICATION CLEAR CORNEA WITH STANDARD INTRAOCULAR LENS IMPLANT;  Surgeon: Milan Bernardo MD;  [facility]     SCLERAL BUCKLE  1971    RE     SCLERAL BUCKLE  1980    LE     YAG CAPSULOTOMY OD (RIGHT EYE)  10/30/2014       Family History   Problem Relation Age of Onset     Dementia Mother      Heart Failure Father      Asthma Father      Diabetes Paternal Grandfather      Cancer No family hx of      Glaucoma No family hx of      Macular Degeneration No family hx of      Skin Cancer No family hx of      Melanoma No family hx of        Social History   Substance Use Topics     Smoking status: Never Smoker     Smokeless tobacco: Never Used     Alcohol use Yes      Comment: occaisonal        Patient Supplied Answers to Review of Systems  [facility]  ENT ROS 10/5/2018   Psychology Frequently feeling anxious   Eyes Visual loss   Ears, Nose, Throat Hearing loss   Endocrine Thirst     The remainder of the 10 point review of systems is otherwise negative.    Physical Examination:  Constitutional:  In no acute distress, appears stated age  Eyes:  Extraocular movements intact, no spontaneous nystagmus  Ears:  Both ears examined under the microscope.  Small amount of cerumen are removed from both ears.  The alligator forceps and right angle hook were used bilaterally.  The tympanic membranes are normal.  The Wilson deviates to the left.  The Rinne is negative on the left.  Respiratory:  No increased work of breathing, wheezing or stridor  Musculoskeletal:  Good upper extremity strength  Skin:  No rashes on the head and neck  Neurologic:  House Brackmann 1/6 bilaterally, ambulating normally  Psychiatric:  Alert, normal affect, answering questions appropriately    Audiogram: The audiogram does not confirm a low frequency supranormal threshold but only one frequency was tested at 250 Hz.  Tympanograms and reflexes are normal.    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    Plan:  In order to assess this better, a high-resolution CT scan and cVEMP testing should be completed.      SL/ms    Miguel Choi MD

## 2018-10-05 NOTE — PROGRESS NOTES
Reason for Visit:  Jefry Hatfield is seen in consultation from Dr. Rodrigues.      History of Present Illness: He is a 67-year-old male being seen for noise sensitivity.  The patient reports that he experiences an abnormal noise that when he is exposed to any kind of sound produces some degree of anxiety.  He describes a sense of pressure or fullness that can be associated.  He denies any imbalance or dizziness with any of the symptoms.  The patient has no unilaterality to it.    Past Medical History:   Diagnosis Date     Anisometropia      Cataract, right eye      High myopia      Myopic degeneration      Posterior staphyloma     LE     Pseudophakia of left eye      Retinal detachment RE 1971,[id number]    BE       Past Surgical History:   Procedure Laterality Date     APPENDECTOMY       CATARACT IOL, RT/LT Bilateral 11/14/12LE 7/1/14RE    BE     COLONOSCOPY N/A 5/25/2018    Procedure: COLONOSCOPY;  Screening Colonoscopy;  Surgeon: Zak Kaplan MD;  [facility]     PHACOEMULSIFICATION CLEAR CORNEA WITH STANDARD INTRAOCULAR LENS IMPLANT  7/1/2014    Procedure: PHACOEMULSIFICATION CLEAR CORNEA WITH STANDARD INTRAOCULAR LENS IMPLANT;  Surgeon: Milan Bernardo MD;  [facility]     SCLERAL BUCKLE  1971    RE     SCLERAL BUCKLE  1980    LE     YAG CAPSULOTOMY OD (RIGHT EYE)  10/30/2014       Family History   Problem Relation Age of Onset     Dementia Mother      Heart Failure Father      Asthma Father      Diabetes Paternal Grandfather      Cancer No family hx of      Glaucoma No family hx of      Macular Degeneration No family hx of      Skin Cancer No family hx of      Melanoma No family hx of        Social History   Substance Use Topics     Smoking status: Never Smoker     Smokeless tobacco: Never Used     Alcohol use Yes      Comment: occaisonal        Patient Supplied Answers to Review of Systems  [facility] ENT ROS 10/5/2018   Psychology Frequently feeling anxious   Eyes Visual loss   Ears, Nose,  Throat Hearing loss   Endocrine Thirst     The remainder of the 10 point review of systems is otherwise negative.    Physical Examination:  Constitutional:  In no acute distress, appears stated age  Eyes:  Extraocular movements intact, no spontaneous nystagmus  Ears:  Both ears examined under the microscope.  Small amount of cerumen are removed from both ears.  The alligator forceps and right angle hook were used bilaterally.  The tympanic membranes are normal.  The Wilson deviates to the left.  The Rinne is negative on the left.  Respiratory:  No increased work of breathing, wheezing or stridor  Musculoskeletal:  Good upper extremity strength  Skin:  No rashes on the head and neck  Neurologic:  House Brackmann 1/6 bilaterally, ambulating normally  Psychiatric:  Alert, normal affect, answering questions appropriately    Audiogram: The audiogram does not confirm a low frequency supranormal threshold but only one frequency was tested at 250 Hz.  Tympanograms and reflexes are normal.    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    Plan:  In order to assess this better, a high-resolution CT scan and cVEMP testing should be completed.      CLAUDIA/ms

## 2018-10-05 NOTE — PROGRESS NOTES
AUDIOLOGY REPORT    SUMMARY: Audiology visit completed. See audiogram for results.      RECOMMENDATIONS: Follow-up with ENT.      Latha Munroe.  Licensed Audiologist  [place] #[id number]

## 2018-10-05 NOTE — MR AVS SNAPSHOT
After Visit Summary   10/5/2018    Jefry Hatfield    MRN: [id number]           Patient Information     Date Of Birth          1950        Visit Information        Provider Department      10/5/2018 10:00 AM Miguel Choi MD [facility]        Today's Diagnoses     Hearing loss    -  1    Vertigo          Care Instructions    You will need  to schedule a CT scan. We will contact you with results of this testing.         Please call our clinic for any questions,concerns,or worsening symptoms.      Clinic #[phone]       Option 3  for the ENT Care Team.       Option 1 for scheduling.    Kate LILLY RNCC  [phone]            Follow-ups after your visit        Additional Services     AUDIOLOGY BALANCE REFERRAL       Your provider has referred you to: [facility] [phone] [email or web address]        Procedure(s): Vestibular Evoked Myogenic Potentials (VEMP) cVEMP Only                  Your next 10 appointments already scheduled     Oct 05, 2018  8:00 PM CDT   CT TEMPORAL WO CONTRAST with [facility]   [facility] ([facility])    [address] [address] [phone]           How do I prepare for my exam? (Food and drink instructions) No Food and Drink Restrictions.  How do I prepare for my exam? (Other instructions) You do not need to do anything special to prepare for this exam. For a sinus scan: Use your nose spray (nasal decongestant spray) as directed.  What should I wear: Please wear loose clothing, such as a sweat suit or jogging clothes. Avoid snaps, zippers and other metal. We may ask you to undress and put on a hospital gown.  How long does the exam take: Most scans take less than 20 minutes.  What should I bring: Please bring any scans or X-rays taken at other hospitals, if similar tests were done. Also bring a list of your  medicines, including vitamins, minerals and over-the-counter drugs. It is safest to leave personal items at home.  Do I need a : No  is needed.  What do I need to tell my doctor? Be sure to tell your doctor: * If you have any allergies. * If there s any chance you are pregnant. * If you are breastfeeding.  What should I do after the exam: No restrictions, You may resume normal activities.  What is this test: A CT (computed tomography) scan is a series of pictures that allows us to look inside your body. The scanner creates images of the body in cross sections, much like slices of bread. This helps us see any problems more clearly.  Who should I call with questions: If you have any questions, please call the Imaging Department where you will have your exam. Directions, parking instructions, and other information is available on our website, [facility]/imaging.            Oct 22, 2018 10:40 AM CDT   [facility] Extremity with Blake Murray PT   [facility] ([facility])    [address] [place] [address] [phone]            Mar 12, 2019  7:55 AM CDT   (Arrive by 7:40 AM)   PHYSICAL with Brooke Gonzalez MD   [facility] ([facility])    [address] [place] [address] [phone]              Future tests that were ordered for you today     Open Future Orders        Priority Expected Expires Ordered    CT Temporal Bones wo Contrast Routine  10/5/2019 10/5/2018            Who to contact     Please call your clinic at [phone] to:    Ask questions about your health    Make or cancel appointments    Discuss your medicines    Learn about your test results    Speak to your doctor            Additional Information About Your Visit        MyChart Information     [employer] gives you secure access to your electronic health record. If you see a primary  care provider, you can also send messages to your care team and make appointments. If you have questions, please call your primary care clinic.  If you do not have a primary care provider, please call [phone] and they will assist you.      [employer] is an electronic gateway that provides easy, online access to your medical records. With [employer], you can request a clinic appointment, read your test results, renew a prescription or communicate with your care team.     To access your existing account, please contact your [facility] or call [phone] for assistance.        Care EveryWhere ID     This is your Care EveryWhere ID. This could be used by other organizations to access your [facility] medical records  [id number]        Your Vitals Were     Height BMI (Body Mass Index)                1.829 m (6') 22.38 kg/m2           Blood Pressure from Last 3 Encounters:   10/02/18 127/85   05/25/18 134/81   04/29/18 120/70    Weight from Last 3 Encounters:   10/05/18 74.8 kg (165 lb)   05/14/18 79.4 kg (175 lb)   04/28/18 79.4 kg (175 lb)              We Performed the Following     AUDIOLOGY BALANCE REFERRAL        Primary Care Provider Office Phone # Fax #    Brooke Gonzalez [phone]       [address] [place] [address]        Equal Access to Services     [place] : Hadii aad ku hadasho Soomaali, waaxda luqadaha, qaybta kaalmada jesus, danika diallo. So [facility] [phone].    ATENCIÓN: Si habla español, tiene a payan disposición servicios gratuitos de asistencia lingüística. Gael al [phone].    We comply with applicable federal civil rights laws and [place] laws. We do not discriminate on the basis of race, color, national origin, age, disability, sex, sexual orientation, or gender identity.            Thank you!     Thank you for choosing [facility] EAR NOSE AND TH[facility]AT  for your care. Our goal is  always to provide you with excellent care. Hearing back from our patients is one way we can continue to improve our services. Please take a few minutes to complete the written survey that you may receive in the mail after your visit with us. Thank you!             Your Updated Medication List - Protect others around you: Learn how to safely use, store and throw away your medicines at www.disposemymeds.org.          This list is accurate as of 10/5/18 10:36 AM.  Always use your most recent med list.                   Brand Name Dispense Instructions for use Diagnosis    CALCIUM-MAG-VIT C-VIT D PO      Take by mouth 2 times daily        nortriptyline 25 MG capsule    PAMELOR          OMEGA-3 FISH OIL PO      Take by mouth daily        oxyCODONE IR 5 MG tablet    ROXICODONE    15 tablet    Take 1 tablet (5 mg) by mouth every 6 hours as needed for pain        SAW PALMETTO EXTRACT PO      Take by mouth daily        VITAMIN D (CHOLECALCIFEROL) PO      Take by mouth daily

## 2018-10-05 NOTE — NURSING NOTE
Chief Complaint   Patient presents with     Consult     15 years anxiety driven hypersensitivity to sound per pt     Ht 1.829 m (6')  Wt 74.8 kg (165 lb)  BMI 22.38 kg/m2    Brenna Landa LPN

## 2018-10-05 NOTE — MR AVS SNAPSHOT
After Visit Summary   10/5/2018    Jefry Hatfield    MRN: [id number]           Patient Information     Date Of Birth          1950        Visit Information        Provider Department      10/5/2018 9:00 AM Kaye Handley AuD [facility] Audiology        Today's Diagnoses     Sensory hearing loss, bilateral    -  1       Follow-ups after your visit        Your next 10 appointments already scheduled     Oct 22, 2018 10:40 AM CDT   AMADOU Extremity with Blake Murray PT   [facility] ([facility])    [address] [place] [address] [address] [facility] [address] [phone]            Mar 12, 2019  7:55 AM CDT   (Arrive by 7:40 AM)   PHYSICAL with Brooke Gonzalez MD   [facility] ([facility])    [address] [place] [address] [phone]              Who to contact     Please call your clinic at [phone] to:    Ask questions about your health    Make or cancel appointments    Discuss your medicines    Learn about your test results    Speak to your doctor            Additional Information About Your Visit        Relative[employer]aihart Information     [employer] gives you secure access to your electronic health record. If you see a primary care provider, you can also send messages to your care team and make appointments. If you have questions, please call your primary care clinic.  If you do not have a primary care provider, please call [phone] and they will assist you.      [employer] is an electronic gateway that provides easy, online access to your medical records. With [employer], you can request a clinic appointment, read your test results, renew a prescription or communicate with your care team.     To access your existing account, please contact your [facility] or call [phone] for assistance.        Care EveryWhere ID     This is your  Care EveryWhere ID. This could be used by other organizations to access your [facility] medical records  [id number]         Blood Pressure from Last 3 Encounters:   10/02/18 127/85   05/25/18 134/81   04/29/18 120/70    Weight from Last 3 Encounters:   10/05/18 74.8 kg (165 lb)   05/14/18 79.4 kg (175 lb)   04/28/18 79.4 kg (175 lb)              We Performed the Following     AUDIOGRAM/TYMPANOGRAM - INTERFACE     [facility] ([id number])     Tymps / Reflex   ([id number])        Primary Care Provider Office Phone # Fax #    Brooke Gonzalez [phone]       [address] [place] [address]        Equal Access to Services     [place] : Hadii aad ku hadasho Soomaali, waaxda luqadaha, qaybta kaalmada adeegyada, danika barkleyin haytila iglesias . So [facility] [phone].    ATENCIÓN: Si habla español, tiene a payan disposición servicios gratuitos de asistencia lingüística. Gael al [phone].    We comply with applicable federal civil rights laws and [place] laws. We do not discriminate on the basis of race, color, national origin, age, disability, sex, sexual orientation, or gender identity.            Thank you!     Thank you for choosing [facility] AUDIOLOGY  for your care. Our goal is always to provide you with excellent care. Hearing back from our patients is one way we can continue to improve our services. Please take a few minutes to complete the written survey that you may receive in the mail after your visit with us. Thank you!             Your Updated Medication List - Protect others around you: Learn how to safely use, store and throw away your medicines at www.disposemymeds.org.          This list is accurate as of 10/5/18 11:49 AM.  Always use your most recent med list.                   Brand Name Dispense Instructions for use Diagnosis    CALCIUM-MAG-VIT C-VIT D PO      Take by mouth 2 times daily        nortriptyline 25 MG  capsule    PAMELOR          OMEGA-3 FISH OIL PO      Take by mouth daily        oxyCODONE IR 5 MG tablet    ROXICODONE    15 tablet    Take 1 tablet (5 mg) by mouth every 6 hours as needed for pain        SAW PALMETTO EXTRACT PO      Take by mouth daily        VITAMIN D (CHOLECALCIFEROL) PO      Take by mouth daily

## 2018-10-05 NOTE — LETTER
10/5/2018       RE: Jefry Hatfield  [address] [place] [address]     Dear Colleague,    Thank you for referring your patient, Jefry Hatfield, to the [facility] EAR NOSE AND THROAT at [facility]. Please see a copy of my visit note below.    Reason for Visit:  Jefry Hatfield is seen in consultation from Dr. Rodrigues.      History of Present Illness: He is a 67-year-old male being seen for noise sensitivity.  The patient reports that he experiences an abnormal noise that when he is exposed to any kind of sound produces some degree of anxiety.  He describes a sense of pressure or fullness that can be associated.  He denies any imbalance or dizziness with any of the symptoms.  The patient has no unilaterality to it.    Past Medical History:   Diagnosis Date     Anisometropia      Cataract, right eye      High myopia      Myopic degeneration      Posterior staphyloma     LE     Pseudophakia of left eye      Retinal detachment RE 1971,[id number]    BE       Past Surgical History:   Procedure Laterality Date     APPENDECTOMY       CATARACT IOL, RT/LT Bilateral 11/14/12LE 7/1/14RE    BE     COLONOSCOPY N/A 5/25/2018    Procedure: COLONOSCOPY;  Screening Colonoscopy;  Surgeon: Zak Kaplan MD;  [facility]: [facility]     PHACOEMULSIFICATION CLEAR CORNEA WITH STANDARD INTRAOCULAR LENS IMPLANT  7/1/2014    Procedure: PHACOEMULSIFICATION CLEAR CORNEA WITH STANDARD INTRAOCULAR LENS IMPLANT;  Surgeon: Milan Bernardo MD;  Location:  [facility]     SCLERAL BUCKLE  1971    RE     SCLERAL BUCKLE  1980    LE     YAG CAPSULOTOMY OD (RIGHT EYE)  10/30/2014       Family History   Problem Relation Age of Onset     Dementia Mother      Heart Failure Father      Asthma Father      Diabetes Paternal Grandfather      Cancer No family hx of      Glaucoma No family hx of      Macular Degeneration No family hx of      Skin Cancer No family hx of      Melanoma No family hx of         Social History   Substance Use Topics     Smoking status: Never Smoker     Smokeless tobacco: Never Used     Alcohol use Yes      Comment: occaisonal        Patient Supplied Answers to Review of Systems   ENT ROS 10/5/2018   Psychology Frequently feeling anxious   Eyes Visual loss   Ears, Nose, Throat Hearing loss   Endocrine Thirst     The remainder of the 10 point review of systems is otherwise negative.    Physical Examination:  Constitutional:  In no acute distress, appears stated age  Eyes:  Extraocular movements intact, no spontaneous nystagmus  Ears:  Both ears examined under the microscope.  Small amount of cerumen are removed from both ears.  The alligator forceps and right angle hook were used bilaterally.  The tympanic membranes are normal.  The Wilson deviates to the left.  The Rinne is negative on the left.  Respiratory:  No increased work of breathing, wheezing or stridor  Musculoskeletal:  Good upper extremity strength  Skin:  No rashes on the head and neck  Neurologic:  House Brackmann 1/6 bilaterally, ambulating normally  Psychiatric:  Alert, normal affect, answering questions appropriately    Audiogram: The audiogram does not confirm a low frequency supranormal threshold but only one frequency was tested at 250 Hz.  Tympanograms and reflexes are normal.    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    Plan:  In order to assess this better, a high-resolution CT scan and cVEMP testing should be completed.      SL/ms      Again, thank you for allowing me to participate in the care of your patient.      Sincerely,    Miguel Choi MD

## 2018-10-05 NOTE — PATIENT INSTRUCTIONS
You will need  to schedule a CT scan. We will contact you with results of this testing.         Please call our clinic for any questions,concerns,or worsening symptoms.      Clinic #[phone]       Option 3  for the ENT Care Team.       Option 1 for scheduling.    Kate LILLY RNCC  [phone]

## 2018-10-22 ENCOUNTER — THERAPY VISIT (OUTPATIENT)
Dept: PHYSICAL THERAPY | Facility: CLINIC | Age: 68
End: 2018-10-22
Payer: COMMERCIAL

## 2018-10-22 DIAGNOSIS — M25.511 SHOULDER PAIN, RIGHT: ICD-10-CM

## 2018-10-22 PROCEDURE — 97110 THERAPEUTIC EXERCISES: CPT | Mod: GP | Performed by: PHYSICAL THERAPIST

## 2018-10-22 PROCEDURE — 97112 NEUROMUSCULAR REEDUCATION: CPT | Mod: GP | Performed by: PHYSICAL THERAPIST

## 2018-10-22 NOTE — PROGRESS NOTES
Subjective:  HPI                    Objective:  System    Physical Exam    General     ROS    Assessment/Plan:    PROGRESS  REPORT    Progress reporting period is from 10/22/18.       SUBJECTIVE  Jefry reports that his shoulders and posture have been better lately with the use of a posture shirt and with PT exercises.  Reports that he still feels he needs help with his neck and with some low back pain..       Current pain level is 4/10  .     Previous pain level was  6/10  .   Changes in function:  Yes (See Goal flowsheet attached for changes in current functional level)  Adverse reaction to treatment or activity: None    OBJECTIVE  Improved rounded shoulder posture    Positive for forward head posture      Deep cervical flexor strength = 3/5    Core strength = 2/5    ASSESSMENT/PLAN  Updated problem list and treatment plan: Diagnosis 1:  Neck, Back, and shoulder pain  Pain -  hot/cold therapy, splint/taping/bracing/orthotics, self management, education and home program  Decreased strength - therapeutic exercise and therapeutic activities  Impaired muscle performance - neuro re-education  Decreased function - therapeutic activities  Impaired posture - neuro re-education  STG/LTGs have been met or progress has been made towards goals:  Yes (See Goal flow sheet completed today.)  Assessment of Progress: The patient's progress has slowed.  Self Management Plans:  Patient has been instructed in a home treatment program.  I have re-evaluated this patient and find that the nature, scope, duration and intensity of the therapy is appropriate for the medical condition of the patient.  Jefry continues to require the following intervention to meet STG and LTG's:  PT    Recommendations:  This patient would benefit from continued therapy.     Frequency:  1 X a month, once daily  Duration:  for 3 months        Please refer to the daily flowsheet for treatment today, total treatment time and time spent performing 1:1 timed  codes.

## 2018-10-22 NOTE — MR AVS SNAPSHOT
After Visit Summary   10/22/2018    Jefry Hatfield    MRN: [id number]           Patient Information     Date Of Birth          1950        Visit Information        Provider Department      10/22/2018 10:40 AM Blake Murray PT [facility] Physical Therapy [facility]        Today's Diagnoses     Shoulder pain, right           Follow-ups after your visit        Your next 10 appointments already scheduled     Dec 03, 2018 10:40 AM CST   [facility] Extremity with Blake Murray PT   [facility] ([facility])    [address] [place] [address] [address] [facility] [address] [phone]            Mar 12, 2019  7:55 AM CDT   (Arrive by 7:40 AM)   PHYSICAL with Brooke Gonzalez MD   [facility] ([facility])    [address] [place] [address]  4th [facility] [address] [phone]              Who to contact     If you have questions or need follow up information about today's clinic visit or your schedule please contact [facility] PHYSICAL THERAPY [facility] directly at [phone].  Normal or non-critical lab and imaging results will be communicated to you by theAudiencehart, letter or phone within 4 business days after the clinic has received the results. If you do not hear from us within 7 days, please contact the clinic through theAudiencehart or phone. If you have a critical or abnormal lab result, we will notify you by phone as soon as possible.  Submit refill requests through [email or web address] or call your pharmacy and they will forward the refill request to us. Please allow 3 business days for your refill to be completed.          Additional Information About Your Visit        theAudiencehart Information     [email or web address] gives you secure access to your electronic health record. If you see a primary care provider, you can also send messages to your care team and make appointments. If you have questions, please call your primary care clinic.   If you do not have a primary care provider, please call [phone] and they will assist you.        Care EveryWhere ID     This is your Care EveryWhere ID. This could be used by other organizations to access your [facility] medical records  [id number]         Blood Pressure from Last 3 Encounters:   10/02/18 127/85   05/25/18 134/81   04/29/18 120/70    Weight from Last 3 Encounters:   10/05/18 74.8 kg (165 lb)   05/14/18 79.4 kg (175 lb)   04/28/18 79.4 kg (175 lb)              We Performed the Following     [facility] PROGRESS NOTES REPORT     NEUROMUSCULAR RE-EDUCATION     THERAPEUTIC EXERCISES        Primary Care Provider Office Phone # Fax #    Brooke Gonzalez [phone]       [address] [place] [address]        Equal Access to Services     [facility]: Hadii arminda quiroz hadasho Sodenise, waaxda luqadaha, qaybta kaalmada adeegyada, danika alvarez haytila iglesias . So [facility] [phone].    ATENCIÓN: Si habla español, tiene a payan disposición servicios gratuitos de asistencia lingüística. Llame al [phone].    We comply with applicable federal civil rights laws and [place] laws. We do not discriminate on the basis of race, color, national origin, age, disability, sex, sexual orientation, or gender identity.            Thank you!     Thank you for choosing [facility] PHYSICAL THERAPY [facility]  for your care. Our goal is always to provide you with excellent care. Hearing back from our patients is one way we can continue to improve our services. Please take a few minutes to complete the written survey that you may receive in the mail after your visit with us. Thank you!             Your Updated Medication List - Protect others around you: Learn how to safely use, store and throw away your medicines at www.disposemymeds.org.          This list is accurate as of 10/22/18  4:02 PM.  Always use your most recent med list.                   Brand Name Dispense  Instructions for use Diagnosis    CALCIUM-MAG-VIT C-VIT D PO      Take by mouth 2 times daily        nortriptyline 25 MG capsule    PAMELOR          OMEGA-3 FISH OIL PO      Take by mouth daily        oxyCODONE IR 5 MG tablet    ROXICODONE    15 tablet    Take 1 tablet (5 mg) by mouth every 6 hours as needed for pain        SAW PALMETTO EXTRACT PO      Take by mouth daily        VITAMIN D (CHOLECALCIFEROL) PO      Take by mouth daily

## 2018-10-23 ENCOUNTER — TELEPHONE (OUTPATIENT)
Dept: OTOLARYNGOLOGY | Facility: CLINIC | Age: 68
End: 2018-10-23

## 2018-10-23 NOTE — TELEPHONE ENCOUNTER
Spoke with pts wife regarding scheduling CT and cVEMP. Pts wife states they have seen a psychologist for this issue and do not wish to pursue any testing at this time. Encouraged pts wife to call back should they decide to pursue testing in the future. Kate LILLY RNCC

## 2018-10-30 ENCOUNTER — OFFICE VISIT (OUTPATIENT)
Dept: OPTOMETRY | Facility: CLINIC | Age: 68
End: 2018-10-30

## 2018-10-30 DIAGNOSIS — H44.23 DEGENERATIVE MYOPIA, BILATERAL: ICD-10-CM

## 2018-10-30 DIAGNOSIS — H52.31 ANISOMETROPIA: Primary | ICD-10-CM

## 2018-10-30 NOTE — MR AVS SNAPSHOT
After Visit Summary   10/30/2018    Jefry Hatfield    MRN: [id number]           Patient Information     Date Of Birth          1950        Visit Information        Provider Department      10/30/2018 8:00 AM [facility]        Today's Diagnoses     Anisometropia    -  1    Degenerative myopia, bilateral           Follow-ups after your visit        Your next 10 appointments already scheduled     Nov 12, 2018  2:30 PM CST   [facility] Extremity with Blake Murray PT   [facility] Physical Therapy [facility] ([facility])    [address] [place] [id number] [phone]            Dec 03, 2018 10:40 AM CST   [facility] Extremity with Blake Murray PT   [facility] Physical Therapy [facility] ([facility])    [address] [place] [id number] [phone]            Mar 19, 2019  7:55 AM CDT   (Arrive by 7:40 AM)   PHYSICAL with Brooke Gonzalez MD   [facility] ([facility])    [address] [place] [address] [phone]              Who to contact     Please call your clinic at [phone] to:    Ask questions about your health    Make or cancel appointments    Discuss your medicines    Learn about your test results    Speak to your doctor            Additional Information About Your Visit        MyChart Information     Hexadite gives you secure access to your electronic health record. If you see a primary care provider, you can also send messages to your care team and make appointments. If you have questions, please call your primary care clinic.  If you do not have a primary care provider, please call [phone] and they will assist you.      Hexadite is an electronic gateway that provides easy, online access to your medical records. With Hexadite, you can request a clinic appointment, read  your test results, renew a prescription or communicate with your care team.     To access your existing account, please contact your [facility] or call [phone] for assistance.        Care EveryWhere ID     This is your Care EveryWhere ID. This could be used by other organizations to access your [facility] medical records  [id number]         Blood Pressure from Last 3 Encounters:   10/02/18 127/85   05/25/18 134/81   04/29/18 120/70    Weight from Last 3 Encounters:   10/05/18 74.8 kg (165 lb)   05/14/18 79.4 kg (175 lb)   04/28/18 79.4 kg (175 lb)              Today, you had the following     No orders found for display       Primary Care Provider Office Phone # Fax #    Brooke Gonzalez [phone]       [address] [place] [address]  [facility] [address]        Equal Access to Services     [facility] : Hadii aad ku hadasho Sokristiali, waaxda luqadaha, qaybta kaalmada adeegyada, waxay filippoin hayjose an sydni iglesias . So [facility] [phone].    ATENCIÓN: Si habla español, tiene a payan disposición servicios gratuitos de asistencia lingüística. Gael al [phone].    We comply with applicable federal civil rights laws and [place] laws. We do not discriminate on the basis of race, color, national origin, age, disability, sex, sexual orientation, or gender identity.            Thank you!     Thank you for choosing EYE CLINIC  for your care. Our goal is always to provide you with excellent care. Hearing back from our patients is one way we can continue to improve our services. Please take a few minutes to complete the written survey that you may receive in the mail after your visit with us. Thank you!             Your Updated Medication List - Protect others around you: Learn how to safely use, store and throw away your medicines at www.disposemymeds.org.          This list is accurate as of 10/30/18  2:34 PM.  Always use your most recent med list.                    Brand Name Dispense Instructions for use Diagnosis    CALCIUM-MAG-VIT C-VIT D PO      Take by mouth 2 times daily        nortriptyline 25 MG capsule    PAMELOR          OMEGA-3 FISH OIL PO      Take by mouth daily        oxyCODONE IR 5 MG tablet    ROXICODONE    15 tablet    Take 1 tablet (5 mg) by mouth every 6 hours as needed for pain        SAW PALMETTO EXTRACT PO      Take by mouth daily        VITAMIN D (CHOLECALCIFEROL) PO      Take by mouth daily

## 2018-10-30 NOTE — PROGRESS NOTES
No office visit. CL order only.    Contact Lens Billing  V-Code:  - Soft spherical  Final Contact Lens Rx      Brand Base Curve Diameter Sphere   Right Dailies Total 1 8.5 14.1 -12.00   Left Dailies Total 1 8.5 14.1 -1.50       Expiration Date:  1/22/20         # of units: 2 90-packs  Price per Unit: $95 per 90-pack    This patient requires contact lenses that are medically necessary for either improvement in vision over spectacles, support of the ocular surface, or other therapeutic benefit. These are not cosmetic contact lenses.  Significant anisometropia, degenerative myopia    Encounter Diagnoses   Name Primary?     Anisometropia Yes     Degenerative myopia, bilateral         Date of last eye exam: 1/22/18

## 2018-11-12 ENCOUNTER — THERAPY VISIT (OUTPATIENT)
Dept: PHYSICAL THERAPY | Facility: CLINIC | Age: 68
End: 2018-11-12
Payer: COMMERCIAL

## 2018-11-12 DIAGNOSIS — M25.511 SHOULDER PAIN, RIGHT: ICD-10-CM

## 2018-11-12 PROCEDURE — 97110 THERAPEUTIC EXERCISES: CPT | Mod: GP | Performed by: PHYSICAL THERAPIST

## 2018-11-12 PROCEDURE — 97112 NEUROMUSCULAR REEDUCATION: CPT | Mod: GP | Performed by: PHYSICAL THERAPIST

## 2018-12-03 ENCOUNTER — THERAPY VISIT (OUTPATIENT)
Dept: PHYSICAL THERAPY | Facility: CLINIC | Age: 68
End: 2018-12-03
Payer: COMMERCIAL

## 2018-12-03 DIAGNOSIS — M25.511 SHOULDER PAIN, RIGHT: ICD-10-CM

## 2018-12-03 PROCEDURE — 97112 NEUROMUSCULAR REEDUCATION: CPT | Mod: GP | Performed by: PHYSICAL THERAPIST

## 2018-12-03 PROCEDURE — 97110 THERAPEUTIC EXERCISES: CPT | Mod: GP | Performed by: PHYSICAL THERAPIST

## 2018-12-20 ENCOUNTER — TELEPHONE (OUTPATIENT)
Dept: OCCUPATIONAL THERAPY | Facility: CLINIC | Age: 68
End: 2018-12-20

## 2018-12-20 NOTE — TELEPHONE ENCOUNTER
[facility] Call Center    Phone Message    May a detailed message be left on voicemail: yes    Reason for Call: Other: Pt states he would like to speak with Dr Herrera to touch base. Thank you     Action Taken: Message routed to:  [facility] Eye

## 2019-01-02 ENCOUNTER — TELEPHONE (OUTPATIENT)
Dept: INTERNAL MEDICINE | Facility: CLINIC | Age: 69
End: 2019-01-02

## 2019-01-02 NOTE — TELEPHONE ENCOUNTER
"Returned call to patient. Pt states has a \"pimple\" on sternum, small, 1/2 inch in size. Seems to be pustular. Wants to know if should start with primary care or go straight to dermatology. Informed pt to schedule appointment with primary to begin with and if needed we will refer to derm. Pt agrees and appointment made for 1/10/19.    Sunshine Whitaker CMA at 10:05 AM on 1/2/2019      "

## 2019-01-02 NOTE — TELEPHONE ENCOUNTER
[facility] Call Center    Phone Message    May a detailed message be left on voicemail: yes    Reason for Call: Other: Pt has a pimple/bump on his sternum. Please call him back to discuss.      Action Taken: Message routed to:  [facility] VEE

## 2019-01-07 ENCOUNTER — HOSPITAL ENCOUNTER (OUTPATIENT)
Dept: OCCUPATIONAL THERAPY | Facility: CLINIC | Age: 69
Discharge: HOME | End: 2019-01-07
Attending: OCCUPATIONAL THERAPIST | Admitting: OCCUPATIONAL THERAPIST
Payer: COMMERCIAL

## 2019-01-07 ENCOUNTER — OFFICE VISIT (OUTPATIENT)
Dept: OPTOMETRY | Facility: CLINIC | Age: 69
End: 2019-01-07
Payer: COMMERCIAL

## 2019-01-07 DIAGNOSIS — H44.23 DEGENERATIVE MYOPIA OF BOTH EYES: Primary | ICD-10-CM

## 2019-01-07 DIAGNOSIS — H44.23 DEGENERATIVE MYOPIA, BILATERAL: ICD-10-CM

## 2019-01-07 DIAGNOSIS — H52.31 ANISOMETROPIA: Primary | ICD-10-CM

## 2019-01-07 DIAGNOSIS — H54.3 LOW VISION, BOTH EYES: ICD-10-CM

## 2019-01-07 PROCEDURE — 97165 OT EVAL LOW COMPLEX 30 MIN: CPT | Mod: GO | Performed by: OCCUPATIONAL THERAPIST

## 2019-01-07 PROCEDURE — 97535 SELF CARE MNGMENT TRAINING: CPT | Mod: GO | Performed by: OCCUPATIONAL THERAPIST

## 2019-01-07 ASSESSMENT — REFRACTION_CURRENTRX
OD_DIAMETER: 14.1
OS_BASECURVE: 8.5
OS_DIAMETER: 14.1
OD_SPHERE: -12.00
OD_BRAND: DAILIES TOTAL 1
OS_SPHERE: -1.50
OS_BRAND: DAILIES TOTAL 1
OD_BASECURVE: 8.5

## 2019-01-07 ASSESSMENT — REFRACTION_WEARINGRX
OS_SPHERE: -2.00
OS_AXIS: 055
OD_SPHERE: -2.00
OD_AXIS: 115
OS_CYLINDER: +0.50
OD_CYLINDER: +0.75

## 2019-01-07 ASSESSMENT — REFRACTION_MANIFEST
OS_AXIS: 070
OD_CYLINDER: SPHERE
OD_SPHERE: -12.00
OS_CYLINDER: +1.00
OS_SPHERE: -2.50

## 2019-01-07 ASSESSMENT — VISUAL ACUITY
METHOD: SNELLEN - LINEAR
OS: 20/150
CORRECTION_TYPE: CONTACTS
OD: 20/300
OD_CC: 20/300
OS_CC: 20/150

## 2019-01-07 ASSESSMENT — ACTIVITIES OF DAILY LIVING (ADL): PRIOR_ADL/IADL_STATUS: IMPAIRED PRIOR TO ONSET

## 2019-01-07 NOTE — PROGRESS NOTES
A/P  1.) Degenerative Myopia/Anisometropia OU  -Doing well in soft CL's, wearing daily disposables mostly. Part time [place] soft CL wear  -Current glasses have balance lens right eye. He is interested in trying to improve right eye spectacle vision  -Reviewed anisometropia and typical issues with including different Rx's in glasses. He tolerates aniso Rx well on trial frame today    Reasonable to try including full anisometropic Rx in glasses. Adaptation warning given. If unable to tolerate return to balance lens right eye.

## 2019-01-07 NOTE — PROGRESS NOTES
01/07/19 0800   Visit Type   Type of Visit Initial       Present No   General Information   Start Of Care Date 01/07/19   Referring Physician Gabriela Saenz OD   Orders Evaluate And Treat As Indicated   Date of Order 01/07/19   Medical Diagnosis myopic degeneration, severe visual impairment   Onset Of Illness/injury Or Date Of Surgery 01/07/2019   Surgical/Medical history reviewed Yes  (anxiety and depression - treated, hearing loss)   Prior ADL/IADL status Impaired prior to onset   Others present at visit Spouse/significant other  ([place]a)   Patient/family Goals Statement access to print material, ipad,location of objects, organizational strategies in setting of visual impairment. technology use   Social History/Home Environment   Living Environment House/townhome  (adult son)   Patient Role/employment History  Employed  (anticipating retirment end of academic semester)   Avocational classes, running (half marathon scheduled),swimming, rowing 1-2X week, [employer]   Pain Assessment   Pain Reported No   Fall Risk Screen   Fall screen completed by OT   Have you fallen 2 or more times in the past year? No   Have you fallen and had an injury in the past year? No   Is patient a fall risk? No   Cognitive/Behavioral   Communication Intact   Cognitive Status Intact for evaluation process   Behavior Appropriate   Patient/family aware of diagnosis Yes   How well do you understand your eye condition? Well   Adjustment to disability Good   Physical Status/Equipment   Physical Status No problems observed/reported  (kyphosis )   Visual Report   Functional Complaints Reading;Writing;Homemaking  (location of objects in home, computer communications)   Visual Complaints Scotoma Hindrance right eye;Scotoma Hindrance left eye;Constricted visual fields right eye;Constricted visual fields left eye;Visual fatigue;Light sensitivity   Magnifier (strength and type) does not use.  Removes right lens to read   Reading  "glasses (has distance glasses. \"improves focus a little\")   Technology Closed circuit TV;Computer  (ipad)   Lighting and Glare   Is your lighting adequate? Yes/ at home;Yes/ at work   Is glare a problem? Yes/ outdoors   Are you satisfied with your sunglasses? Yes   Sunglass filter color (has glass corning lenses)   Visual Acuity   Acuity right eye 20/300   Acuity left eye 20/150   Contrast Sensitivity   Contrast sensitivity (score/25) 9/25   [place] Read   Smallest print size read 1.0M with right contact lens removed   Critical print size 4.0M with right contact lens removed   Words per minute at critical print size 100   Dynavision: Evaluation of visual skills/search of extra personal space via 5X4 foot computerized light board with 64 stimuli.  The user reacts as quickly as possible by striking the lights as they turn on in random succession.   Dynavision Cascade Dynavision Mode A (single stimulus attention, 1 minute   Dynavision Mode A (single stimulus attention, 1 minute)   Patient Score (Mode A, 1 min) 17   SRAFVP SCORING   # relevant measures 36   Composite score 49   Percentage of disability (%) 31.94   Education   Learner Patient;Significant Other   Readiness Eager   Method Explanation   Response Verbalizes understanding   Clinical Impression, OT Eval   Criteria for Skilled Therapeutic Interventions Met yes;treatment indicated   Therapy  Diagnosis: Impaired ADL/IADL with deficits in Reading based ADL;Written communication;Home management;Financial management;Dressing  (technology use)   Assessment of Occupational Performance 5 or more Performance Deficits   Identified Performance Deficits as above   Clinical Decision Making (Complexity) Low complexity   OT Visual Rehabilitation Evaluation Plan   Therapy Plan Occupational therapy intervention   Planned Interventions Visual field loss awareness;Visual skills training for near tasks;Visual skills training for safety in mobility;Visual skills training for location of " objects;Low vision compensatory training for reading;Low vision compensatory training for written communication;Low vision compensatory trainging for financial management;Low vision compensatory training for object identification;Instruction in environmental adaptations for contrast;Instruction in environmental adaptations for lighting;Optical device/ADL device instruction and training;Computer modifications;Instruction in community resources   Frequency / Duration 6 tx visits over 12 weeks - 1X every 1-2 weeks for 12 weeks   Risks and Benefits of Treatment have been explained. Yes   Patient, Family in agreement with plan of care Yes   GOALS   Goals Near Vision;Visual Field;Environmental Modification;Resource Education   Goals Addressed this Session Near vision   Goal 1 - Near Vision   Goal Description: Near Vision Patient will verbalize awareness of visual field Loss and demonstrate improved use of visual skills/adaptive equipment for increased independence in reading-based activities of daily living, written communication and detail ADL tasks.   Target Date 04/01/19   Goal 2 - Visual field   Goal Description: Visual field Patient will verbalize awareness of visual field loss and demonstrate improved use of visual skills for increased safety/ independence in locating objects/obstacles and in navigation   Target Date 04/01/19   Goal 3 - Environmental modification   Goal Description: Environment modification Patient will demonstrate understanding of the impact of lighting, contrast and glare on ADL activities, in conjunction with environmentally-based ADL modifications   Target Date 04/01/19   Goal 4 - Resource education   Goal Description: Resource education Patient will verbalize awareness of community resources for the following:;Audio access to print materials;Access to large print materials;Access to low vision devices   Target Date 04/01/19   Total Evaluation Time   OT Eval, Low Complexity Minutes (02601) 50    Therapy Certification   Certification date from 01/07/19   Certification date to 04/01/19   Medical Diagnosis myopic degeneration, severe visual impairment

## 2019-01-10 ENCOUNTER — OFFICE VISIT (OUTPATIENT)
Dept: INTERNAL MEDICINE | Facility: CLINIC | Age: 69
End: 2019-01-10
Payer: COMMERCIAL

## 2019-01-10 VITALS
SYSTOLIC BLOOD PRESSURE: 129 MMHG | OXYGEN SATURATION: 96 % | HEART RATE: 87 BPM | WEIGHT: 163 LBS | BODY MASS INDEX: 22.11 KG/M2 | DIASTOLIC BLOOD PRESSURE: 88 MMHG

## 2019-01-10 DIAGNOSIS — L98.9 SKIN LESION: Primary | ICD-10-CM

## 2019-01-10 ASSESSMENT — PAIN SCALES - GENERAL: PAINLEVEL: NO PAIN (0)

## 2019-01-10 NOTE — PATIENT INSTRUCTIONS
[facility]         Internal Medicine Resident                   Continuity Clinic    Who We Are    Resident Continuity Clinic is a part of the [facility] Primary [facility].  Resident physicians see patients independently and establish a relationship with them over the course of their three-year residency program.  As with the Primary Care Clinic, our Resident Continuity Clinic models a group practice.  If your doctor is not available, you will be able to see another resident physician.  At the end of a resident s training, patients will be transitioned to a new resident physician for ongoing care.     We treat patients with a wide array of medical needs from routine physicals, to acute illnesses, to diabetes and blood pressure management, to complex medical illness.  What is a Resident Physician?    Resident physicians hold medical degrees and are doctors. They are training to become specialists in Internal Medicine. They work under the supervision of board-certified faculty physicians.  Expectations for Your Care    We strive to provide accessible, quality care at all times.    In order to provide this care, it is best to see your primary care resident doctor consistently rather switching between providers.  In the event you do see another physician, you should schedule a follow-up visit with your usual primary care doctor.    If you are transitioning your care from another clinic, it is helpful to have your records available for your doctor to review.    We do not prescribe controlled substances, such as ADD medications or narcotic pain medications, on your first visit.  We will review your health records and concerns prior to devising a treatment plan with you in order to provide the best care.      Clinic Services     Extended clinic hours; patient  to help navigate your visit;  parking; laboratory and imaging services with evening and weekend hours    Multiple medical and  surgical specialties in one building    Complementary services, including Nutrition, Integrative Medicine, Pharmacy consultations, Mental and Behavioral Health, Sports Medicine and Physical Therapy    Thank You    We would like to thank you for choosing the [facility] Resident Continuity Clinic for your primary care. You are making a priceless contribution to the training of the next generation of health care practitioners.     Contact us at [phone] for appointments or questions.    Resident Clinic Hours are Tuesdays and Thursdays, 7:30am-5:00pm    Residents   Johann Leos MD   (Male)   Carolina Galeana MD  (Female)  Sunshine Gupta MD   (Female)   Hazel Gabriel MD   (Female)   Eitan Randolph MD  (Male)   Jesus Ellis MD  (Male)   Quin Santos MD    (Female)   Warren Sexton MD  (Male)   Mauro Walls MD  (Male)    Brooke Gonzalez MD  (Female)   Emery Franco MD  (Male)   Lili Golden MD  (Female)   Flor Rosado MD    (Female)   Josef Child MD  (Male)   Gregory Steve MD  (Male)   Jesus Ferro MD (Male)   Den Ramírez MD  (Male)   Dania Alan MD (Female)    Alysia Spencer MD (Female)   Dave Palomo MD  (Male)   Gwen Espana MD    (Female)   Amber Nicole MD  (Female)    Supervising Physicians   MD Delia Browne MD Briar Duffy, MD James Langland, MD Mary Logeais, MD Tanya Melnik, MD Charles Moldow, MD Heather Thompson Buum, MD Kathleen Watson, MD          Primary Care Center Medication Refill Request Information:  * Please contact your pharmacy regarding ANY request for medication refills.  ** [facility] Prescription Fax = [phone]  * Please allow 3 business days for routine medication refills.  * Please allow 5 business days for controlled substance medication refills.     Primary Care Center Test Result notification information:  *You will be notified with in 7-10 days of your appointment day  regarding the results of your test.  If you are on MyChart you will be notified as soon as the provider has reviewed the results and signed off on them.    [facility]: [phone]     - Please schedule appointment with dermatology for evaluation of skin lesions

## 2019-01-10 NOTE — NURSING NOTE
Chief Complaint   Patient presents with     Derm Problem     Pt is here to discuss a pimple on sternum area.      Stephanie Beckford LPN at 2:28 PM on 1/10/2019.

## 2019-01-10 NOTE — NURSING NOTE
Patient received 2nd Shingrix vaccine.  See immunization list for administration details.  Patient tolerated injection well.     Stephanie Perdomo at 3:02 PM on 1/10/2019.

## 2019-01-10 NOTE — PROGRESS NOTES
"  PRIMARY CARE CENTER         HPI:       Jefry Hatfield is a 67 year old male with history of several vision problems (follows with ophthalmology) anxiety, depression, and cervical radiculopathy (follows with sports medicine) who presents with chief complaint of \"pimple\" on sternum as well as \"bump\" on penis.      Skin lesion on sternum: Has been present on sternal chest for 1 year. Slowly growing. Only slightly painful when he picks at it or scratches it.     Skin lesion on penis: Patient not sure how long this has been present. Describes this as a white firm bump on the shaft of the right side of the penis, near the glans.  No pain, bleeding, or ulceration.       Problem, Medication and Allergy Lists were reviewed and are current.  Patient is an established patient of this clinic.         Review of Systems:   ROS  I have personally reviewed and updated the complete ROS on the day of the visit.       ROS: 10 point ROS neg other than the symptoms noted above in the HPI.         Physical Exam:   /88   Pulse 87   Wt 73.9 kg (163 lb)   SpO2 96%   BMI 22.11 kg/m    Body mass index is 22.11 kg/m .  Vitals were reviewed       GENERAL APPEARANCE: healthy, alert and no distress     EYES: EOMI,  PERRL     HENT: ear canals and TM's normal and nose and mouth without ulcers or lesions.      NECK: no adenopathy, no asymmetry, masses, or scars and thyroid normal to palpation     RESP: lungs clear to auscultation - no rales, rhonchi or wheezes     CV: regular rates and rhythm, normal S1 S2, no S3 or S4 and no murmur, click or rub     ABDOMEN:  soft, nontender, no HSM or masses and bowel sounds normal     MS: extremities normal- no gross deformities noted, no evidence of inflammation in joints, FROM in all extremities.     SKIN:        Sternal lesion: 0.6 cm pearly pink papules with telangiectasia, no central ulceration. Well circumscribed.        Penile lesion: 0.3 cm yellow-white firm lesion on right side of " penis. Firm, well circumscribed.      NEURO: Normal strength and tone, sensory exam grossly normal, mentation intact and speech normal     PSYCH: mentation appears normal. and affect normal/bright     LYMPHATICS: No cervical adenopathy      Results:      Results from the last 24 hoursNo results found for this or any previous visit (from the past 24 hour(s)).  Assessment and Plan     Jefry was seen today for ear problem.    Diagnoses and all orders for this visit:    # Skin lesion - sternum   Grossly most consistent with basal cell carcinoma. Differential includes fibroepithelioma, nevus, squamous papilloma, etc. Patient has no cancer history. Will refer to Dermatology for further evaluation and possible excision.        -     Dermatology referral    # Skin lesion - sternum   Location and exam most consistent with [place] spot. Will refer to Dermatology for further evaluation and possible biopsy/excision.        -     Dermatology referral    # Healthcare maintenance  -     ZOSTER VACCINE RECOMBINANT ADJUVANTED IM NJX  -     Will RTC in 5-6 months for follow up and for 2nd dose Shingrix      Options for treatment and follow-up care were reviewed with the patient. Jefry Hatfield engaged in the decision making process and verbalized understanding of the options discussed and agreed with the final plan.    Brooke Gonzalez MD MPH  Intern, Internal Medicine  Carlos 10, 2019    Plan of care discussed with Dr. DEANDRE Way    While the patient was in clinic, I reviewed the pertinent medical history and results.  I discussed the current findings on physical examination, as well as the patient s diagnosis and treatment plan with the resident and agree with the information as documented with the following exceptions: none.   Joshua Way MD

## 2019-01-11 ENCOUNTER — OFFICE VISIT (OUTPATIENT)
Dept: DERMATOLOGY | Facility: CLINIC | Age: 69
End: 2019-01-11
Payer: COMMERCIAL

## 2019-01-11 VITALS — SYSTOLIC BLOOD PRESSURE: 112 MMHG | DIASTOLIC BLOOD PRESSURE: 73 MMHG | HEART RATE: 74 BPM

## 2019-01-11 DIAGNOSIS — L72.0 MILIA: ICD-10-CM

## 2019-01-11 DIAGNOSIS — L73.8 SEBACEOUS HYPERPLASIA: ICD-10-CM

## 2019-01-11 DIAGNOSIS — D48.5 NEOPLASM OF UNCERTAIN BEHAVIOR OF SKIN: Primary | ICD-10-CM

## 2019-01-11 DIAGNOSIS — D18.01 CHERRY ANGIOMA: ICD-10-CM

## 2019-01-11 RX ORDER — LIDOCAINE HYDROCHLORIDE AND EPINEPHRINE 10; 10 MG/ML; UG/ML
3 INJECTION, SOLUTION INFILTRATION; PERINEURAL ONCE
Status: DISCONTINUED | OUTPATIENT
Start: 2019-01-11 | End: 2019-03-19

## 2019-01-11 ASSESSMENT — PAIN SCALES - GENERAL: PAINLEVEL: NO PAIN (0)

## 2019-01-11 NOTE — PROGRESS NOTES
"[facility] Dermatology Note      Dermatology Problem List:  1. Central chest lesion - DDx neurofibroma vs intradermal nevus r/o BCC  2. Milial cyst, shaft of the penis, s/p I&D 1/11/19     Encounter Date: Jan 11, 2019    CC:  Chief Complaint   Patient presents with     Derm Problem     Jefry is here to seen for \"two spots: one on penis and on chest\"         History of Present Illness:  Mr. Jefry Hatfield is a 68 year old male who presents for evaluation of two lesions of concern. First is a spot overlying the sternum. The patient states that this lesion is not itchy or painful. Patient has not see change with this lesion. He has noticed this spot about a year ago. Patient states that he does pick at this lesion which causes it to be irritated and red. Second is lesion on the penis. Patient states that it looks like a pimple. He noticed this for about 4 to 5 months. Patient states that he has no history of skin cancer and no family history of skin cancer. The patient denies painful, itching, tingling or bleeding lesions unless otherwise noted.      Past Medical History:   Patient Active Problem List   Diagnosis     Vision changes     Senile nuclear sclerosis     Malignant myopia     Pseudophakia of both eyes - Both Eyes     Myopic degeneration     Anisometropia     Cervicalgia     Cervical radiculopathy     Shoulder pain, right     Xerostomia     Past Medical History:   Diagnosis Date     Anisometropia      Cataract, right eye      High myopia      Myopic degeneration      Posterior staphyloma     LE     Pseudophakia of left eye      Retinal detachment RE 1971,[id number]    BE     Past Surgical History:   Procedure Laterality Date     APPENDECTOMY       CATARACT IOL, RT/LT Bilateral 11/14/12LE 7/1/14RE    BE     COLONOSCOPY N/A 5/25/2018    Procedure: COLONOSCOPY;  Screening Colonoscopy;  Surgeon: Zak Kaplan MD;  Location: [facility] OR     PHACOEMULSIFICATION CLEAR CORNEA WITH STANDARD " INTRAOCULAR LENS IMPLANT  7/1/2014    Procedure: PHACOEMULSIFICATION CLEAR CORNEA WITH STANDARD INTRAOCULAR LENS IMPLANT;  Surgeon: Milan Bernardo MD;  Location:  [facility]     SCLERAL BUCKLE  1971    RE     SCLERAL BUCKLE  1980    LE     YAG CAPSULOTOMY OD (RIGHT EYE)  10/30/2014       Social History:   reports that  has never smoked. he has never used smokeless tobacco. He reports that he drinks alcohol. He reports that he does not use drugs.    Family History:  Family History   Problem Relation Age of Onset     Dementia Mother      Heart Failure Father      Asthma Father      Diabetes Paternal Grandfather      Cancer No family hx of      Glaucoma No family hx of      Macular Degeneration No family hx of      Skin Cancer No family hx of      Melanoma No family hx of        Medications:  Current Outpatient Medications   Medication Sig Dispense Refill     CALCIUM-MAG-VIT C-VIT D PO Take by mouth 2 times daily       nortriptyline (PAMELOR) 25 MG capsule   2     Omega-3 Fatty Acids (OMEGA-3 FISH OIL PO) Take by mouth daily       Saw Palmetto, Serenoa repens, (SAW PALMETTO EXTRACT PO) Take by mouth daily       VITAMIN D, CHOLECALCIFEROL, PO Take by mouth daily       oxyCODONE IR (ROXICODONE) 5 MG tablet Take 1 tablet (5 mg) by mouth every 6 hours as needed for pain (Patient not taking: Reported on 10/2/2018) 15 tablet 0       Allergies   Allergen Reactions     Ranitidine Unknown       Review of Systems:  -Constitutional: The patient denies fatigue, fevers, chills, unintended weight loss, and night sweats.  -Skin: As above in HPI. No additional skin concerns.    Physical exam:  Vitals: /73 (BP Location: Left arm, Patient Position: Chair)   Pulse 74   GEN: This is a well developed, well-nourished male in no acute distress, in a pleasant mood.    SKIN: Full skin, which includes the head/face, both arms, chest, back, abdomen,both legs, genitalia and/or groin buttocks, digits and/or nails, was  examined.  -Artis's skin type II  -There are dome shaped bright red papules on the trunk and exteremities.   -There are yellow oily papules with central umbilication located on the cheeks   - on the central chest there is skin colored rubbery sharply cercumsized papule   -dorsal shaft of the penis 2 mm subcutaneous papule with punctum   -No other lesions of concern on areas examined.     Impression/Plan:  1. Central chest lesion. DDx neurofibroma vs intradermal nevus r/o BCC    Shave biopsy:  After discussion of benefits and risks including but not limited to bleeding/bruising, pain/swelling, infection, scar, incomplete removal, nerve damage/numbness, recurrence, and non-diagnostic biopsy, written consent, verbal consent and photographs were obtained. Time-out was performed. The area was cleaned with isopropyl alcohol. 0.5ml of 1% lidocaine with 1:100,000 epinephrine was injected to obtain adequate anesthesia. A shave biopsy was performed. Hemostasis was achieved with aluminium chloride. Vaseline and a sterile dressing were applied. The patient tolerated the procedure and no complications were noted. The patient was provided with verbal and written post care instructions.    2. Milial cyst, shaft of the penis     Discussed etiology     After verbal and written consent, the area was prepped with alcohol.  A 11 blade was used to incise the lesion and the lesion was drained.  The patient tolerated the procedure well and left the Dermatology clinic in good condition.    3. Cherry angiomata and sebaceous hyperplasia    Discussed etiology and reassured benign    No further intervention required. Patient to report changes.       CC Dr. Brooke Gonzalez on close of this encounter.  Follow-up prn for new or changing lesions.        Staff Involved:  Staff/Scribe    Scribe Disclosure:  Keenan KAPLAN, am serving as a scribe to document services personally performed by Dr. Varinder Bell M.D., based on data  collection and the provider's statements to me.     Provider Disclosure:   The documentation recorded by the scribe accurately reflects the services I personally performed and the decisions made by me.    Varinder Bell MD   of Dermatology  Department of Dermatology  [facility]

## 2019-01-11 NOTE — PATIENT INSTRUCTIONS

## 2019-01-11 NOTE — LETTER
"1/11/2019       RE: Jefry Hatfield  [address]  [place] [address]     Dear Colleague,    Thank you for referring your patient, Jefry Hatfield, to the [facility] DERMATOLOGY at [facility]. Please see a copy of my visit note below.    [facility] Dermatology Note      Dermatology Problem List:  1. Central chest lesion - DDx neurofibroma vs intradermal nevus r/o BCC  2. Milial cyst, shaft of the penis, s/p I&D 1/11/19     Encounter Date: Jan 11, 2019    CC:  Chief Complaint   Patient presents with     Derm Problem     Jefry is here to seen for \"two spots: one on penis and on chest\"       History of Present Illness:  Mr. Jefry Hatfield is a 68 year old male who presents for evaluation of two lesions of concern. First is a spot overlying the sternum. The patient states that this lesion is not itchy or painful. Patient has not see change with this lesion. He has noticed this spot about a year ago. Patient states that he does pick at this lesion which causes it to be irritated and red. Second is lesion on the penis. Patient states that it looks like a pimple. He noticed this for about 4 to 5 months. Patient states that he has no history of skin cancer and no family history of skin cancer. The patient denies painful, itching, tingling or bleeding lesions unless otherwise noted.      Past Medical History:   Patient Active Problem List   Diagnosis     Vision changes     Senile nuclear sclerosis     Malignant myopia     Pseudophakia of both eyes - Both Eyes     Myopic degeneration     Anisometropia     Cervicalgia     Cervical radiculopathy     Shoulder pain, right     Xerostomia     Past Medical History:   Diagnosis Date     Anisometropia      Cataract, right eye      High myopia      Myopic degeneration      Posterior staphyloma     LE     Pseudophakia of left eye      Retinal detachment RE 1971,[id number]    BE     Past Surgical History: "   Procedure Laterality Date     APPENDECTOMY       CATARACT IOL, RT/LT Bilateral 11/14/12LE 7/1/14RE    BE     COLONOSCOPY N/A 5/25/2018    Procedure: COLONOSCOPY;  Screening Colonoscopy;  Surgeon: Zak Kaplan MD;  [facility]     PHACOEMULSIFICATION CLEAR CORNEA WITH STANDARD INTRAOCULAR LENS IMPLANT  7/1/2014    Procedure: PHACOEMULSIFICATION CLEAR CORNEA WITH STANDARD INTRAOCULAR LENS IMPLANT;  Surgeon: Milan Bernardo MD;  [facility]     SCLERAL BUCKLE  1971    RE     SCLERAL BUCKLE  1980    LE     YAG CAPSULOTOMY OD (RIGHT EYE)  10/30/2014       Social History:   reports that  has never smoked. he has never used smokeless tobacco. He reports that he drinks alcohol. He reports that he does not use drugs.    Family History:  Family History   Problem Relation Age of Onset     Dementia Mother      Heart Failure Father      Asthma Father      Diabetes Paternal Grandfather      Cancer No family hx of      Glaucoma No family hx of      Macular Degeneration No family hx of      Skin Cancer No family hx of      Melanoma No family hx of        Medications:  Current Outpatient Medications   Medication Sig Dispense Refill     CALCIUM-MAG-VIT C-VIT D PO Take by mouth 2 times daily       nortriptyline (PAMELOR) 25 MG capsule   2     Omega-3 Fatty Acids (OMEGA-3 FISH OIL PO) Take by mouth daily       Saw Palmetto, Serenoa repens, (SAW PALMETTO EXTRACT PO) Take by mouth daily       VITAMIN D, CHOLECALCIFEROL, PO Take by mouth daily       oxyCODONE IR (ROXICODONE) 5 MG tablet Take 1 tablet (5 mg) by mouth every 6 hours as needed for pain (Patient not taking: Reported on 10/2/2018) 15 tablet 0       Allergies   Allergen Reactions     Ranitidine Unknown       Review of Systems:  -Constitutional: The patient denies fatigue, fevers, chills, unintended weight loss, and night sweats.  -Skin: As above in HPI. No additional skin concerns.    Physical exam:  Vitals: /73 (BP Location: Left arm, Patient  Position: Chair)   Pulse 74   GEN: This is a well developed, well-nourished male in no acute distress, in a pleasant mood.    SKIN: Full skin, which includes the head/face, both arms, chest, back, abdomen,both legs, genitalia and/or groin buttocks, digits and/or nails, was examined.  -Artis's skin type II  -There are dome shaped bright red papules on the trunk and exteremities.   -There are yellow oily papules with central umbilication located on the cheeks   - on the central chest there is skin colored rubbery sharply cercumsized papule   -dorsal shaft of the penis 2 mm subcutaneous papule with punctum   -No other lesions of concern on areas examined.     Impression/Plan:  1. Central chest lesion. DDx neurofibroma vs intradermal nevus r/o BCC    Shave biopsy:  After discussion of benefits and risks including but not limited to bleeding/bruising, pain/swelling, infection, scar, incomplete removal, nerve damage/numbness, recurrence, and non-diagnostic biopsy, written consent, verbal consent and photographs were obtained. Time-out was performed. The area was cleaned with isopropyl alcohol. 0.5ml of 1% lidocaine with 1:100,000 epinephrine was injected to obtain adequate anesthesia. A shave biopsy was performed. Hemostasis was achieved with aluminium chloride. Vaseline and a sterile dressing were applied. The patient tolerated the procedure and no complications were noted. The patient was provided with verbal and written post care instructions.    2. Milial cyst, shaft of the penis     Discussed etiology     After verbal and written consent, the area was prepped with alcohol.  A 11 blade was used to incise the lesion and the lesion was drained.  The patient tolerated the procedure well and left the Dermatology clinic in good condition.    3. Cherry angiomata and sebaceous hyperplasia    Discussed etiology and reassured benign    No further intervention required. Patient to report changes.     CC Dr. Cox  Carlos on close of this encounter.    Follow-up prn for new or changing lesions.      Staff Involved:  Staff/Scribe    Scribe Disclosure:  I, Keenan Bowles, am serving as a scribe to document services personally performed by Dr. Varinder Bell M.D., based on data collection and the provider's statements to me.     Provider Disclosure:   The documentation recorded by the scribe accurately reflects the services I personally performed and the decisions made by me.    Varinder Bell MD   of Dermatology  Department of Dermatology  [facility]

## 2019-01-14 ENCOUNTER — HOSPITAL ENCOUNTER (OUTPATIENT)
Dept: OCCUPATIONAL THERAPY | Facility: CLINIC | Age: 69
Discharge: HOME | End: 2019-01-14
Attending: OCCUPATIONAL THERAPIST | Admitting: OCCUPATIONAL THERAPIST
Payer: COMMERCIAL

## 2019-01-14 PROCEDURE — 97535 SELF CARE MNGMENT TRAINING: CPT | Mod: GO | Performed by: OCCUPATIONAL THERAPIST

## 2019-01-14 NOTE — DISCHARGE INSTRUCTIONS
Cell Phone Modifications  1. To invert contrast  a. Triple click home button or ask Grace  2. To Zoom  a. Tap twice with 3 fingers  b. Navigate with 3 fingers  3. VoiceOver  a. Ask Grace to turn on and off  b. When voiceover is on  i. Touch once to hear something  ii. Tap twice to open something   4. Seeing  Scottie  a. Ask Grace to turn it on  b. Hold phone over text to have it read aloud  5. Organize home page so you can locate frequently used apps easily  Kate Herrera, OTR/L  [phone]

## 2019-01-16 LAB — COPATH REPORT: NORMAL

## 2019-01-24 ENCOUNTER — HOSPITAL ENCOUNTER (OUTPATIENT)
Dept: OCCUPATIONAL THERAPY | Facility: CLINIC | Age: 69
Discharge: HOME | End: 2019-01-24
Attending: OCCUPATIONAL THERAPIST | Admitting: OCCUPATIONAL THERAPIST
Payer: COMMERCIAL

## 2019-01-24 PROCEDURE — 97535 SELF CARE MNGMENT TRAINING: CPT | Mod: GO | Performed by: OCCUPATIONAL THERAPIST

## 2019-01-24 NOTE — DISCHARGE INSTRUCTIONS
Visual Rehabilitation Summary  1. Cell Phone  a. Zoom gestures  a. Relax your hand and use a light 3 finger pad tap 2X to zoom  b. To modify zoom, hold on second tap and drag up for bigger, down for smaller.  b. Seeing   a. Ask Grace  to  open Seeing    2. OrCam  a. Call Kala for an in-home demonstration  Kate Herrera, OTR/L  [phone]

## 2019-01-28 ENCOUNTER — HOSPITAL ENCOUNTER (OUTPATIENT)
Dept: OCCUPATIONAL THERAPY | Facility: CLINIC | Age: 69
Discharge: HOME | End: 2019-01-28
Attending: OCCUPATIONAL THERAPIST | Admitting: OCCUPATIONAL THERAPIST
Payer: COMMERCIAL

## 2019-01-28 DIAGNOSIS — H44.23 DEGENERATIVE MYOPIA OF BOTH EYES: Primary | ICD-10-CM

## 2019-01-28 PROCEDURE — 97535 SELF CARE MNGMENT TRAINING: CPT | Mod: GO | Performed by: OCCUPATIONAL THERAPIST

## 2019-01-28 NOTE — DISCHARGE INSTRUCTIONS
V[employer] Loss [employer]:  [phone]    Voice Over   Unlock phone   Ask Grace to open voice over    Open CereScan     Touch gigi to hear it     Then double tap to open it.    Navigate document by touch  Two finger touch to pause and two finger touch to restart      Dictation  On the key board - the microphone is the third button over. Touch and talk.   Touch the bottom of the screen when done talking.     BARD  I will send in the application and you will receive an email in a week or two from them.    Vision Loss Resource:   Call them!

## 2019-01-30 ENCOUNTER — OFFICE VISIT (OUTPATIENT)
Dept: OPHTHALMOLOGY | Facility: CLINIC | Age: 69
End: 2019-01-30
Attending: OPHTHALMOLOGY
Payer: COMMERCIAL

## 2019-01-30 DIAGNOSIS — H44.23 DEGENERATIVE MYOPIA OF BOTH EYES: ICD-10-CM

## 2019-01-30 PROCEDURE — G0463 HOSPITAL OUTPT CLINIC VISIT: HCPCS | Mod: ZF

## 2019-01-30 PROCEDURE — 92134 CPTRZ OPH DX IMG PST SGM RTA: CPT | Mod: ZF | Performed by: OPHTHALMOLOGY

## 2019-01-30 ASSESSMENT — SLIT LAMP EXAM - LIDS
COMMENTS: NORMAL
COMMENTS: NORMAL

## 2019-01-30 ASSESSMENT — REFRACTION_WEARINGRX
OD_CYLINDER: +0.75
OD_AXIS: 115
OS_CYLINDER: +0.50
OS_AXIS: 055
OD_SPHERE: -2.00
OS_SPHERE: -2.00

## 2019-01-30 ASSESSMENT — VISUAL ACUITY
OS_PH_CC: 20/150
OS_CC: 20/200
OD_CC: 20/250
METHOD: SNELLEN - LINEAR
CORRECTION_TYPE: CONTACTS
OD_PH_CC: 20/200

## 2019-01-30 ASSESSMENT — EXTERNAL EXAM - LEFT EYE: OS_EXAM: NORMAL

## 2019-01-30 ASSESSMENT — TONOMETRY
OD_IOP_MMHG: 13
IOP_METHOD: TONOPEN
OS_IOP_MMHG: 13

## 2019-01-30 ASSESSMENT — EXTERNAL EXAM - RIGHT EYE: OD_EXAM: NORMAL

## 2019-01-30 NOTE — NURSING NOTE
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 Pt seeing more floaters in RE.    Candido Cummings [facility] January 30, 2019 9:22 AM

## 2019-01-30 NOTE — PROGRESS NOTES
CC: follow up myopic degeneration, posterior staphylomas    HPI: Jefry Hatfield is a  68 year old year-old patient with history of myopic degeneration both eyes., He has been followed in the past by Dr. Gopi Anne.    Interval: Reports progressive decreased vision both eyes, no new flashes and floaters      OCULAR HISTORY  Retinal detachment  With scleral buckle both eyes  1971, [id number]  Cataract extraction/IOL right eye 7-1-14  Cataract extraction/IOL left eye 11-14-12  S/p yag pc right eye 10-30-14    Retinal Imaging:  OCT  1-30-19  RE: posterior staphyloma, retina atrophic changes. Thin choroid  LE: posterior staphyloma, retina atrophic changes. Thin choroid and trace Epiretinal membrane     Assessment & Plan:    1.  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 pseudophakia both eyes - observe    Plan:  - observe  Discussed with patient the use of low vision aids including orcam camera  Consider follow up with low vision - Dr. Kate Herrera  Follow up in 12 months, sooner as needed    Steven Ross M.D.  PGY-3, Ophthalmology    ~~~~~~~~~~~~~~~~~~~~~~~~~~~~~~~~~~   Complete documentation of historical and exam elements from today's encounter can be found in the full encounter summary report (not reduplicated in this progress note).  I personally obtained the chief complaint(s) and history of present illness.  I confirmed and edited as necessary the review of systems, past medical/surgical history, family history, social history, and examination findings as documented by others; and I examined the patient myself.  I personally reviewed the relevant tests, images, and reports as documented above.  I personally  reviewed the ophthalmic test(s) associated with this encounter, agree with the interpretation(s) as documented by the resident/fellow, and have edited the corresponding report(s) as necessary.   I formulated and edited as necessary the assessment and plan and discussed the findings and management plan with the patient and family    Lisa Chang MD  .  Retina Service   Department of Ophthalmology and Visual Neurosciences   [facility]  Phone: [phone]   Fax: [phone]

## 2019-02-04 ENCOUNTER — HOSPITAL ENCOUNTER (OUTPATIENT)
Dept: OCCUPATIONAL THERAPY | Facility: CLINIC | Age: 69
Discharge: HOME | End: 2019-02-04
Attending: OCCUPATIONAL THERAPIST | Admitting: OCCUPATIONAL THERAPIST
Payer: COMMERCIAL

## 2019-02-04 PROCEDURE — 97535 SELF CARE MNGMENT TRAINING: CPT | Mod: GO | Performed by: OCCUPATIONAL THERAPIST

## 2019-02-19 ENCOUNTER — OFFICE VISIT (OUTPATIENT)
Dept: OPTOMETRY | Facility: CLINIC | Age: 69
End: 2019-02-19

## 2019-02-19 DIAGNOSIS — H44.23 DEGENERATIVE MYOPIA, BILATERAL: ICD-10-CM

## 2019-02-19 DIAGNOSIS — H52.31 ANISOMETROPIA: Primary | ICD-10-CM

## 2019-02-19 NOTE — PROGRESS NOTES
No office visit. CL order only.    Contact Lens Billing  V-Code:  - Soft spherical  Final Contact Lens Rx       Brand Base Curve Diameter Sphere    Right Dailies Total 1 8.5 14.1 -12.00    Left Dailies Total 1 8.5 14.1 -1.50         # of units: 1 90-pack right eye  Price per Unit: $95 per 90-pack    This patient requires contact lenses that are medically necessary for either improvement in vision over spectacles, support of the ocular surface, or other therapeutic benefit. These are not cosmetic contact lenses.  Significant anisometropia, degenerative myopia    Encounter Diagnoses   Name Primary?     Anisometropia Yes     Degenerative myopia, bilateral         Date of last eye exam: 1/7/19

## 2019-02-20 NOTE — ADDENDUM NOTE
Encounter addended by: Kate Herrera OT on: 2/20/2019 11:06 AM   Actions taken: Flowsheet data copied forward, Flowsheet accepted

## 2019-03-19 ENCOUNTER — OFFICE VISIT (OUTPATIENT)
Dept: INTERNAL MEDICINE | Facility: CLINIC | Age: 69
End: 2019-03-19
Payer: COMMERCIAL

## 2019-03-19 VITALS
RESPIRATION RATE: 16 BRPM | BODY MASS INDEX: 21.91 KG/M2 | HEIGHT: 72 IN | WEIGHT: 161.8 LBS | HEART RATE: 67 BPM | DIASTOLIC BLOOD PRESSURE: 79 MMHG | SYSTOLIC BLOOD PRESSURE: 123 MMHG

## 2019-03-19 DIAGNOSIS — Z00.00 ROUTINE HISTORY AND PHYSICAL EXAMINATION OF ADULT: Primary | ICD-10-CM

## 2019-03-19 ASSESSMENT — MIFFLIN-ST. JEOR: SCORE: 1551.92

## 2019-03-19 ASSESSMENT — PAIN SCALES - GENERAL: PAINLEVEL: NO PAIN (0)

## 2019-03-19 NOTE — PATIENT INSTRUCTIONS
Primary Care Center Phone Number [phone]  Primary Care Center Medication Refill Request Information:  * Please contact your pharmacy regarding ANY request for medication refills.  ** [facility] Prescription Fax = [phone]  * Please allow 3 business days for routine medication refills.  * Please allow 5 business days for controlled substance medication refills.     [facility] Care Center Test Result notification information:  *You will be notified with in 7-10 days of your appointment day regarding the results of your test.  If you are on MyChart you will be notified as soon as the provider has reviewed the results and signed off on them.               [facility]         Internal Medicine Resident                   Continuity Clinic    Who We Are    Resident Continuity Clinic is a part of the [facility] Primary [facility].  Resident physicians see patients independently and establish a relationship with them over the course of their three-year residency program.  As with the Primary Care Clinic, our Resident Continuity Clinic models a group practice.  If your doctor is not available, you will be able to see another resident physician.  At the end of a resident s training, patients will be transitioned to a new resident physician for ongoing care.     We treat patients with a wide array of medical needs from routine physicals, to acute illnesses, to diabetes and blood pressure management, to complex medical illness.  What is a Resident Physician?    Resident physicians hold medical degrees and are doctors. They are training to become specialists in Internal Medicine. They work under the supervision of board-certified faculty physicians.  Expectations for Your Care    We strive to provide accessible, quality care at all times.    In order to provide this care, it is best to see your primary care resident doctor consistently rather switching between providers.  In the event you do see another physician, you  should schedule a follow-up visit with your usual primary care doctor.    If you are transitioning your care from another clinic, it is helpful to have your records available for your doctor to review.    We do not prescribe controlled substances, such as ADD medications or narcotic pain medications, on your first visit.  We will review your health records and concerns prior to devising a treatment plan with you in order to provide the best care.      Clinic Services     Extended clinic hours; patient  to help navigate your visit;  parking; laboratory and imaging services with evening and weekend hours    Multiple medical and surgical specialties in one building    Complementary services, including Nutrition, Integrative Medicine, Pharmacy consultations, Mental and Behavioral Health, Sports Medicine and Physical Therapy    Thank You    We would like to thank you for choosing the [facility] Internal Medicine Resident Continuity Clinic for your primary care. You are making a priceless contribution to the training of the next generation of health care practitioners.     Contact us at [phone] for appointments or questions.    Resident Clinic Hours are Tuesdays and Thursdays, 7:30am-5:00pm    Residents   Johann Leos MD  (Male)   Carolina Galeana MD (Female)  Sunshine Gupta MD   (Female)   Hazel Gabriel MD   (Female)   Eitan Randolph MD  (Male)   Jesus Ellis MD  (Male)   Quin Santos MD    (Female)   Warren Sexton MD  (Male)   Mauro Walls MD  (Male)    Brooke Gonzalez MD  (Female)   Emery Franco MD  (Male)   Lili Golden MD  (Female)   Flor Rosado MD   (Female)   Josef Child MD  (Male)   Gregory Steve MD  (Male)   Jesus Ferro MD (Male)   Den Ramírez MD  (Male)   Dania Alan MD (Female)    Alysia Spencer MD (Female)   Dave Palomo MD  (Male)   Gwen Espana MD    (Female)   Amber Nicole MD  (Female)    Supervising  Physicians   Gladis Lund, MD Joshua Matos, MD Kate Curry MD Tanya Melnik, MD Charles Moldow, MD Heather Thompson Buum, MD Kathleen Watson, MD          - Shingrix vaccine #2 was administered today  - You are up to date on your health maintenance  - No concerns on physical exam  - Return to clinic in 1 year or earlier if needed

## 2019-03-19 NOTE — NURSING NOTE
Chief Complaint   Patient presents with     Physical     pt is here for an annual physical       Sunshine Whitaker CMA at 8:13 AM on 3/19/2019

## 2019-03-19 NOTE — PROGRESS NOTES
PRIMARY CARE CENTER       SUBJECTIVE:  Jefry Hatfield is a 68 year old year old male with history of several vision problems (follows with ophthalmology), anxiety, depression, and cervical radiculopathy (follows with sports medicine) who presents for routine physical exam and Shingrix vaccine #2.     He has a complex history of anxiety, depression, noise sensitivity/ intolerance, vision problems (follows with ophthalmology) and cervical radiculopathy (follows with sports medicine). His symptoms are stable and he has no new complaints.     We discussed the findings of the biopsy he underwent last visit of the skin lesion of his chest; this was a neurofibroma. He also complained of a cyst on his penis, which was determined to be a milial cyst by Dermatology, was incised and drained.     He is doing well. No complaints. Continues to run 7-8 miles on Saturdays/Sundays. Does [employer] and meditates for his anxiety, which helps with symptoms. He underwent a root canal last week with no complications. He is retiring soon and is looking forward to this.     He is due for Shingrix vaccine dose #2.     Medications and allergies reviewed by me today.     ROS:   CONSTITUTIONAL: NEGATIVE for fever, chills, change in weight  INTEGUMENTARY/SKIN: NEGATIVE for worrisome rashes, moles or lesions  EYES: NEGATIVE for vision changes   ENT/MOUTH: NEGATIVE for ear, mouth and throat problems  RESP: NEGATIVE for significant cough or SOB  BREAST: NEGATIVE for masses, tenderness or discharge  CV: NEGATIVE for chest pain, palpitations or peripheral edema  GI: NEGATIVE for nausea, abdominal pain, heartburn, or change in bowel habits  : NEGATIVE for frequency, dysuria, or hematuria  MUSCULOSKELETAL: + chronic pain of upper arms and chest (hx of cervical radiculopathy). This has been improving with PT and exercise.   NEURO: NEGATIVE for weakness, dizziness or paresthesias  ENDOCRINE: NEGATIVE for temperature  intolerance, skin/hair changes  HEME: NEGATIVE for bleeding problems  PSYCHIATRIC: NEGATIVE for changes in mood or affect. Anxiety is well controlled.   Patient Active Problem List   Diagnosis     Vision changes     Senile nuclear sclerosis     Malignant myopia     Pseudophakia of both eyes - Both Eyes     Myopic degeneration     Anisometropia     Cervicalgia     Cervical radiculopathy     Shoulder pain, right     Xerostomia     Past Medical History:   Diagnosis Date     Anisometropia      Cataract, right eye      High myopia      Myopic degeneration      Posterior staphyloma     LE     Pseudophakia of left eye      Retinal detachment RE 1971,[id number]    BE     Past Surgical History:   Procedure Laterality Date     APPENDECTOMY       CATARACT IOL, RT/LT Bilateral 11/14/12LE 7/1/14RE    BE     COLONOSCOPY N/A 5/25/2018    Procedure: COLONOSCOPY;  Screening Colonoscopy;  Surgeon: Zak Kaplan MD;  Location: [facility] OR     PHACOEMULSIFICATION CLEAR CORNEA WITH STANDARD INTRAOCULAR LENS IMPLANT  7/1/2014    Procedure: PHACOEMULSIFICATION CLEAR CORNEA WITH STANDARD INTRAOCULAR LENS IMPLANT;  Surgeon: Milan Bernardo MD;  Location:  [facility]     SCLERAL BUCKLE  1971    RE     SCLERAL BUCKLE  1980    LE     YAG CAPSULOTOMY OD (RIGHT EYE)  10/30/2014     Family History   Problem Relation Age of Onset     Dementia Mother      Heart Failure Father      Asthma Father      Diabetes Paternal Grandfather      Cancer No family hx of      Glaucoma No family hx of      Macular Degeneration No family hx of      Skin Cancer No family hx of      Melanoma No family hx of      Social History     Socioeconomic History     Marital status:      Spouse name: Not on file     Number of children: Not on file     Years of education: Not on file     Highest education level: Not on file   Occupational History     Occupation:       Employer: [employer]   Social Needs     Financial resource strain: Not on  file     Food insecurity:     Worry: Not on file     Inability: Not on file     Transportation needs:     Medical: Not on file     Non-medical: Not on file   Tobacco Use     Smoking status: Never Smoker     Smokeless tobacco: Never Used   Substance and Sexual Activity     Alcohol use: Yes     Comment: occaisonal      Drug use: No     Sexual activity: Yes     Partners: Female   Lifestyle     Physical activity:     Days per week: Not on file     Minutes per session: Not on file     Stress: Not on file   Relationships     Social connections:     Talks on phone: Not on file     Gets together: Not on file     Attends Buddhism service: Not on file     Active member of club or organization: Not on file     Attends meetings of clubs or organizations: Not on file     Relationship status: Not on file     Intimate partner violence:     Fear of current or ex partner: Not on file     Emotionally abused: Not on file     Physically abused: Not on file     Forced sexual activity: Not on file   Other Topics Concern     Parent/sibling w/ CABG, MI or angioplasty before 65F 55M? Not Asked   Social History Narrative    , works at [employer]     Current Outpatient Medications   Medication Sig Dispense Refill     CALCIUM-MAG-VIT C-VIT D PO Take by mouth 2 times daily       nortriptyline (PAMELOR) 25 MG capsule 125 mg daily  2     Omega-3 Fatty Acids (OMEGA-3 FISH OIL PO) Take by mouth daily       oxyCODONE IR (ROXICODONE) 5 MG tablet Take 1 tablet (5 mg) by mouth every 6 hours as needed for pain 15 tablet 0     Saw Palmetto, Serenoa repens, (SAW PALMETTO EXTRACT PO) Take by mouth daily       VITAMIN D, CHOLECALCIFEROL, PO Take by mouth daily       Allergies   Allergen Reactions     Ranitidine Unknown       OBJECTIVE:    /79   Pulse 67   Resp 16   Ht 1.829 m (6')   Wt 73.4 kg (161 lb 12.8 oz)   BMI 21.94 kg/m     Wt Readings from Last 1 Encounters:   03/19/19 73.4 kg (161 lb 12.8 oz)       GENERAL APPEARANCE:  healthy, alert and no distress     EYES: EOMI,  PERRL     HENT: ear canals and TM's normal and nose and mouth without ulcers or lesions     NECK: no adenopathy, no asymmetry, masses, or scars and thyroid normal to palpation     RESP: lungs clear to auscultation - no rales, rhonchi or wheezes     CV: regular rates and rhythm, normal S1 S2, no S3 or S4 and no murmur, click or rub     ABDOMEN:  soft, nontender, no HSM or masses and bowel sounds normal     MS: extremities normal- no gross deformities noted, no evidence of inflammation in joints, FROM in all extremities.     SKIN: no suspicious lesions or rashes. Site of recent biopsy is healing well.      NEURO: Normal strength and tone, sensory exam grossly normal, mentation intact and speech normal     PSYCH: mentation appears normal. and affect normal/bright     LYMPHATICS: No cervical adenopathy     ASSESSMENT/PLAN:  Mr. Hatfield was seen today for routine physical exam and Shingrix vaccine dose #2.     Healthcare maintenance:   - Shingrix #2 today  - Up to date on colonoscopy (last in 2018, next due in 2023)  - Other health maintenance issues: cholesterol mildly elevated, he continues to work on his diet. Blood pressure is at goal. He is maintaining a healthy lifestyle with diet and exercise.       Pt should return to clinic for f/u with me in 1 year or earlier if needed.     Brooke Gonzalez MD MPH  Intern, Internal Medicine  Mar 19, 2019      Teaching Physician Note:  I was present during the visit and the patient was seen and examined by me.   I discussed the case with the resident and agree with the note as documented by the resident with the following exceptions:  None.    Gladis Lund M.D.  Internal Medicine   pager [phone]

## 2019-05-21 ENCOUNTER — OFFICE VISIT (OUTPATIENT)
Dept: INTERNAL MEDICINE | Facility: CLINIC | Age: 69
End: 2019-05-21
Payer: COMMERCIAL

## 2019-05-21 ENCOUNTER — OFFICE VISIT (OUTPATIENT)
Dept: OPTOMETRY | Facility: CLINIC | Age: 69
End: 2019-05-21

## 2019-05-21 VITALS
RESPIRATION RATE: 16 BRPM | SYSTOLIC BLOOD PRESSURE: 110 MMHG | BODY MASS INDEX: 22.53 KG/M2 | DIASTOLIC BLOOD PRESSURE: 72 MMHG | HEART RATE: 70 BPM | WEIGHT: 166.1 LBS

## 2019-05-21 DIAGNOSIS — Z20.828 HISTORY OF EXPOSURE TO MEASLES: ICD-10-CM

## 2019-05-21 DIAGNOSIS — Z00.00 HEALTH CARE MAINTENANCE: Primary | ICD-10-CM

## 2019-05-21 DIAGNOSIS — H52.31 ANISOMETROPIA: Primary | ICD-10-CM

## 2019-05-21 DIAGNOSIS — H44.23 DEGENERATIVE MYOPIA, BILATERAL: ICD-10-CM

## 2019-05-21 DIAGNOSIS — H54.3 LOW VISION, BOTH EYES: ICD-10-CM

## 2019-05-21 DIAGNOSIS — M54.12 CERVICAL RADICULOPATHY: ICD-10-CM

## 2019-05-21 DIAGNOSIS — Z00.00 HEALTH CARE MAINTENANCE: ICD-10-CM

## 2019-05-21 RX ORDER — GABAPENTIN 100 MG/1
CAPSULE ORAL
Refills: 1 | COMMUNITY
Start: 2019-04-09 | End: 2023-07-03

## 2019-05-21 ASSESSMENT — REFRACTION_CURRENTRX
OD_DIAMETER: 14.1
OD_BRAND: DAILIES TOTAL 1
OD_SPHERE: -12.00
OD_BASECURVE: 8.5
OS_SPHERE: -1.50
OS_BRAND: DAILIES TOTAL 1
OS_DIAMETER: 14.1
OS_BASECURVE: 8.5

## 2019-05-21 ASSESSMENT — PAIN SCALES - GENERAL: PAINLEVEL: NO PAIN (0)

## 2019-05-21 NOTE — PATIENT INSTRUCTIONS
Dear Mr. Hatfield,     Today we discussed your rash. I recommend:   - Barrier cream ointment (A+D), which you can get from the drugstore   - If this does not help and you continue to have discomfort and inflammation, try Neosporin ointment (also over the counter)    For your ongoing neck pain, I have placed a referral to Physical Therapy.     We also are checking your measles titer.     Please go to the lab on the first floor before you leave today.       Primary Care Center Phone Number [phone]  Primary Care Center Medication Refill Request Information:  * Please contact your pharmacy regarding ANY request for medication refills.  ** [facility] Prescription Fax = [phone]  * Please allow 3 business days for routine medication refills.  * Please allow 5 business days for controlled substance medication refills.     Primary Care Center Test Result notification information:  *You will be notified with in 7-10 days of your appointment day regarding the results of your test.  If you are on MyChart you will be notified as soon as the provider has reviewed the results and signed off on them.               [facility]         Internal Medicine Resident                   Continuity Clinic    Who We Are    Resident Continuity Clinic is a part of the [facility] Primary Care Clinic.  Resident physicians see patients independently and establish a relationship with them over the course of their three-year residency program.  As with the Primary Care Clinic, our Resident Continuity Clinic models a group practice.  If your doctor is not available, you will be able to see another resident physician.  At the end of a resident s training, patients will be transitioned to a new resident physician for ongoing care.     We treat patients with a wide array of medical needs from routine physicals, to acute illnesses, to diabetes and blood pressure management, to complex medical illness.  What is a Resident  Physician?    Resident physicians hold medical degrees and are doctors. They are training to become specialists in Internal Medicine. They work under the supervision of board-certified faculty physicians.  Expectations for Your Care    We strive to provide accessible, quality care at all times.    In order to provide this care, it is best to see your primary care resident doctor consistently rather switching between providers.  In the event you do see another physician, you should schedule a follow-up visit with your usual primary care doctor.    If you are transitioning your care from another clinic, it is helpful to have your records available for your doctor to review.    We do not prescribe controlled substances, such as ADD medications or narcotic pain medications, on your first visit.  We will review your health records and concerns prior to devising a treatment plan with you in order to provide the best care.      Clinic Services     Extended clinic hours; patient  to help navigate your visit;  parking; laboratory and imaging services with evening and weekend hours    Multiple medical and surgical specialties in one building    Complementary services, including Nutrition, Integrative Medicine, Pharmacy consultations, Mental and Behavioral Health, Sports Medicine and Physical Therapy    Thank You    We would like to thank you for choosing the [facility] Internal Medicine Resident Continuity Clinic for your primary care. You are making a priceless contribution to the training of the next generation of health care practitioners.     Contact us at [phone] for appointments or questions.    Resident Clinic Hours are Tuesdays and Thursdays, 7:30am-5:00pm    Residents   Johann Leos MD  (Male)   Carolina Galeana MD (Female)  Sunshine Gupta MD   (Female)   Hazel Gabriel MD   (Female)   Eitan Randolph MD  (Male)   Jesus Ellis MD  (Male)   Quin Santos MD    (Female)   Warren  MD Darshan  (Male)   Mauro Walls MD  (Male)    Brooke Gonzalez MD  (Female)   Emery Franco MD  (Male)   Lili Golden MD  (Female)   Flor Rosado MD   (Female)   Josef Child MD  (Male)   Gregory Steve MD  (Male)   Jesus Ferro MD (Male)   Den Ramírez MD  (Male)   Dania Alan MD (Female)    Alysia Spencer MD (Female)   Dave Palomo MD  (Male)   Gwen Espana MD    (Female)   Amber Nicole MD  (Female)    Supervising Physicians   MD Delia Browne, MD Joshua Way, MD Parish Lanier, MD Kate Christina, MD Marycarmen Wheeler, MD Macario Guerra, MD Genevieve Yost, MD Aleena Das MD

## 2019-05-21 NOTE — NURSING NOTE
Chief Complaint   Patient presents with     Derm Problem     pt is here to discuss itchy rash on buttock        Vital signs:      BP: 110/72 Pulse: 70   Resp: 16         Weight: 75.3 kg (166 lb 1.6 oz)  Estimated body mass index is 22.53 kg/m  as calculated from the following:    Height as of 3/19/19: 1.829 m (6').    Weight as of this encounter: 75.3 kg (166 lb 1.6 oz).      Sunshine Whitaker CMA at 7:35 AM on 5/21/2019

## 2019-05-21 NOTE — PROGRESS NOTES
[facility]       SUBJECTIVE:  Jefry Hatfield is a 68 year old year old male with complex history of anxiety, depression, noise sensitivity/ intolerance, several vision problems (follows with ophthalmology) and cervical radiculopathy (previously followed with sports medicine/PT) who presents for evaluation of rash on his buttocks and for check of measles titers.      He has this rash on his lower buttocks for a few weeks now. He has noticed skin sloughing and flaking. He attributes this to friction when exercising. He runs ~7 miles at a time frequently and rows sometimes. Lately does run 7-8 miles on Saturdays/Sundays, and feels some friction in the buttocks area when running. He tried some triamcinolone ointment with minimal improvement. No associated bleeding. This is mildly tender and uncomfortable.     He would also like measles titers checked today. He was vaccinated in 1996, and is worried because he has seen the reports of increased measles outbreaks in the area. He denies any exposures personally.     He is still planning on retiring at the end of June. His wife retired last year. He hopes to find projects or part time work to keep him busy. He has no other complaints.     Medications and allergies reviewed by me today.     ROS:   CONSTITUTIONAL: NEGATIVE for fever, chills, change in weight  INTEGUMENTARY/SKIN: NEGATIVE for worrisome rashes, moles or lesions  EYES: NEGATIVE for vision changes   ENT/MOUTH: NEGATIVE for ear, mouth and throat problems  RESP: NEGATIVE for significant cough or SOB  BREAST: NEGATIVE for masses, tenderness or discharge  CV: NEGATIVE for chest pain, palpitations or peripheral edema  GI: NEGATIVE for nausea, abdominal pain, heartburn, or change in bowel habits  : NEGATIVE for frequency, dysuria, or hematuria  MUSCULOSKELETAL: + chronic pain of neck and chest (hx of cervical radiculopathy). In the past, this has improved with PT and exercise.    NEURO: NEGATIVE for weakness, dizziness or paresthesias  ENDOCRINE: NEGATIVE for temperature intolerance, skin/hair changes  HEME: NEGATIVE for bleeding problems  PSYCHIATRIC: NEGATIVE for changes in mood or affect. Anxiety is well controlled.   Patient Active Problem List   Diagnosis     Vision changes     Senile nuclear sclerosis     Malignant myopia     Pseudophakia of both eyes - Both Eyes     Myopic degeneration     Anisometropia     Cervicalgia     Cervical radiculopathy     Shoulder pain, right     Xerostomia     Adenomatous polyp of colon     Past Medical History:   Diagnosis Date     Anisometropia      Cataract, right eye      Cervical radiculopathy      High myopia      Myopic degeneration      Posterior staphyloma     LE     Pseudophakia of left eye      Retinal detachment RE 1971,[id number]    BE     Past Surgical History:   Procedure Laterality Date     APPENDECTOMY       CATARACT IOL, RT/LT Bilateral 11/14/12LE 7/1/14RE    BE     COLONOSCOPY N/A 5/25/2018    Procedure: COLONOSCOPY;  Screening Colonoscopy;  Surgeon: Zak Kaplan MD;  [facility]: [facility]     PHACOEMULSIFICATION CLEAR CORNEA WITH STANDARD INTRAOCULAR LENS IMPLANT  7/1/2014    Procedure: PHACOEMULSIFICATION CLEAR CORNEA WITH STANDARD INTRAOCULAR LENS IMPLANT;  Surgeon: Milan Bernardo MD;  [facility]     SCLERAL BUCKLE  1971    RE     SCLERAL BUCKLE  1980    LE     YAG CAPSULOTOMY OD (RIGHT EYE)  10/30/2014     Family History   Problem Relation Age of Onset     Dementia Mother      Heart Failure Father      Asthma Father      Diabetes Paternal Grandfather      Cancer No family hx of      Glaucoma No family hx of      Macular Degeneration No family hx of      Skin Cancer No family hx of      Melanoma No family hx of      Social History     Socioeconomic History     Marital status:      Spouse name: Not on file     Number of children: Not on file     Years of education: Not on file     Highest education level: Not on  file   Occupational History     Occupation:       Employer: [employer]   Social Needs     Financial resource strain: Not on file     Food insecurity:     Worry: Not on file     Inability: Not on file     Transportation needs:     Medical: Not on file     Non-medical: Not on file   Tobacco Use     Smoking status: Never Smoker     Smokeless tobacco: Never Used   Substance and Sexual Activity     Alcohol use: Yes     Comment: occaisonal      Drug use: No     Sexual activity: Yes     Partners: Female   Lifestyle     Physical activity:     Days per week: Not on file     Minutes per session: Not on file     Stress: Not on file   Relationships     Social connections:     Talks on phone: Not on file     Gets together: Not on file     Attends [employer] service: Not on file     Active member of club or organization: Not on file     Attends meetings of clubs or organizations: Not on file     Relationship status: Not on file     Intimate partner violence:     Fear of current or ex partner: Not on file     Emotionally abused: Not on file     Physically abused: Not on file     Forced sexual activity: Not on file   Other Topics Concern     Parent/sibling w/ CABG, MI or angioplasty before 65F 55M? Not Asked   Social History Narrative    , works at [employer]     Current Outpatient Medications   Medication Sig Dispense Refill     CALCIUM-MAG-VIT C-VIT D PO Take by mouth 2 times daily       gabapentin (NEURONTIN) 100 MG capsule   1     nortriptyline (PAMELOR) 25 MG capsule 125 mg daily  2     Omega-3 Fatty Acids (OMEGA-3 FISH OIL PO) Take by mouth daily       Saw Palmetto, Serenoa repens, (SAW PALMETTO EXTRACT PO) Take by mouth daily       VITAMIN D, CHOLECALCIFEROL, PO Take by mouth daily       oxyCODONE IR (ROXICODONE) 5 MG tablet Take 1 tablet (5 mg) by mouth every 6 hours as needed for pain (Patient not taking: Reported on 5/21/2019) 15 tablet 0     Allergies   Allergen Reactions      Ranitidine Unknown     OBJECTIVE:    /72   Pulse 70   Resp 16   Wt 75.3 kg (166 lb 1.6 oz)   BMI 22.53 kg/m     Wt Readings from Last 1 Encounters:   05/21/19 75.3 kg (166 lb 1.6 oz)       GENERAL APPEARANCE: healthy, alert and no distress     EYES: EOMI,  PERRL     HENT: ear canals and TM's normal and nose and mouth without ulcers or lesions     NECK: no adenopathy, no asymmetry, masses, or scars and thyroid normal to palpation     RESP: lungs clear to auscultation - no rales, rhonchi or wheezes     CV: RRR, normal S1 S2, no S3 or S4 and no murmur, click or rub     ABDOMEN:  soft, nontender, no HSM or masses and bowel sounds normal     MS: extremities normal- no gross deformities noted, no evidence of inflammation in joints, FROM in all extremities.     SKIN: erythematous, flaky rash at vertical gluteal crease which extends ~3 cm to the left. No vesicles. No bleeding. Some skin sloughing. Entire area of rash measures ~7.5 x 4.5 cm. Anus uninvolved. No external hemorrhoids. No discharge.      NEURO: Normal strength and tone, sensory exam grossly normal, mentation intact and speech normal     PSYCH: mentation appears normal. and affect normal/bright     LYMPHATICS: No cervical adenopathy     ASSESSMENT/PLAN:  Mr. Hatfield was seen today for rash and request to check measles titers.     Diagnoses and all orders for this visit:    Buttocks rash  Consistent with rash secondary to friction. Discussed with patient that most effective treatment is barrier cream and methods to avoid chafing. Discussed OTC products such as A+D cream/ Vaseline. If this does not work, patient may try Neosporin cream. If rash worsens or does not improve over the next 1-2 weeks, will consider referral to Dermatology.     History of exposure to measles  Health care maintenance  -     Rubeola Antibody IgG; Future    Cervical radiculopathy  Continues to experience neck pain. Has followed with PT in the past with improvement of symptoms.    -     PHYSICAL THERAPY REFERRAL; Future      Pt should return to clinic for f/u with me in 1 year or earlier if needed.     Brooke Gonzalez MD MPH  Intern, Internal Medicine  May 21, 2019    Plan of care was discussed with attending physician Dr. LANDON Lund.     Teaching Physician Note:    While the patient was in clinic, I reviewed the patient's medical history and results, the resident's findings on physical examination, and the patient's diagnosis and treatment plan with the resident.  I agree with the information documented with the following exceptions: none.    Gladis Lund M.D.  Internal Medicine  [facility]   pager [phone]

## 2019-05-21 NOTE — PROGRESS NOTES
No office visit. CL order only.    Contact Lens Billing  V-Code:  - Soft spherical  Final Contact Lens Rx       Brand Base Curve Diameter Sphere    Right Dailies Total 1 8.5 14.1 -12.00    Left Dailies Total 1 8.5 14.1 -1.50         # of units: 2 90-packs  Price per Unit: $95 per 90-pack    This patient requires contact lenses that are medically necessary for either improvement in vision over spectacles, support of the ocular surface, or other therapeutic benefit. These are not cosmetic contact lenses.  Significant anisometropia, degenerative myopia    Encounter Diagnoses   Name Primary?     Anisometropia Yes     Degenerative myopia, bilateral      Low vision, both eyes         Date of last eye exam: 1/7/19

## 2019-05-22 LAB — MEV IGG SER QL IA: >8 AI (ref 0–0.8)

## 2019-06-03 ENCOUNTER — THERAPY VISIT (OUTPATIENT)
Dept: PHYSICAL THERAPY | Facility: CLINIC | Age: 69
End: 2019-06-03
Payer: COMMERCIAL

## 2019-06-03 DIAGNOSIS — M54.12 CERVICAL RADICULOPATHY: ICD-10-CM

## 2019-06-03 PROBLEM — M25.511 SHOULDER PAIN, RIGHT: Status: RESOLVED | Noted: 2018-04-26 | Resolved: 2019-06-03

## 2019-06-03 PROCEDURE — 97110 THERAPEUTIC EXERCISES: CPT | Mod: GP | Performed by: PHYSICAL THERAPIST

## 2019-06-03 PROCEDURE — 97161 PT EVAL LOW COMPLEX 20 MIN: CPT | Mod: GP | Performed by: PHYSICAL THERAPIST

## 2019-06-03 PROCEDURE — 97112 NEUROMUSCULAR REEDUCATION: CPT | Mod: GP | Performed by: PHYSICAL THERAPIST

## 2019-06-03 NOTE — PROGRESS NOTES
[facility] Initial Evaluation  Subjective:    Jefry Hatfield is a 68 year old male with a cervical spine (neck and shoulder pain) condition.  Condition occurred with:  Degenerative joint disease, repetition/overuse and other reason.  Condition occurred: for unknown reasons.  This is a chronic condition     Site of Pain: no radiation.  Radiates to:  None.  Pain is described as aching and sharp and is intermittent and reported as 4/10.  Associated symptoms:  Loss of motion/stiffness and loss of strength. Pain is the same all the time.  Exacerbated by: working at the computer, reaching, sitting in the forward head and rounded shoulder position. Relieved by: exercise, activity.  Since onset symptoms are unchanged.  Special testing: none.  Previous treatment includes physical therapy and chiropractic.  There was moderate improvement following previous treatment.  General health as reported by patient is good.  Past medical history: Myopic Degeneration                                         Medical allergies: no.  Surgical history: none reported.  Current medications:  None as reported by the patient.  Current occupation is  at [employer].  Patient is working in normal job without restrictions.  Primary job tasks include:  Prolonged sitting.                                Objective:  Standing Alignment:    Cervical/Thoracic:  Forward head  Shoulder/UE:  Rounded shoulders              Gait:    Gait Type:  Normal   Assistive Devices:  None      Flexibility/Screens:   Positive screens:  CervicalNegative screens: Shoulder   Upper Extremity:    Decreased left upper extremity flexibility at:  Pectoralis Major and Pectoralis Minor    Decreased right upper extremity flexibility present at:  Pectoralis Major and Pectoralis Minor                      Cervical/Thoracic Evaluation    AROM:  AROM Cervical:    Flexion:          75%  Extension:       25%  Rotation:         Left:      Right:  Side Bend:      Left: 50%     Right:  50%    Strength: cervical spine = 4/5  Headaches: cervical  Cervical Myotomes:  normal                  DTR's:  normal                           Shoulder Evaluation:  ROM:  AROM:  normal                            PROM:  normal                                Strength:    Flexion: Left:4+/5   Pain:    Right: 4+/5     Pain:   Extension:  Left: 4+/5    Pain:      Abduction:  Left: 4+/5  Pain:    Right: 4+/5     Pain:    Internal Rotation:  Left:5-/5     Pain:    Right: 5-/5     Pain:  External Rotation:   Left:4+/5     Pain:   Right:4+/5     Pain:                                                     General     ROS    Assessment/Plan:    Patient is a 68 year old male with cervical complaints.    Patient has the following significant findings with corresponding treatment plan.                Diagnosis 1:  Cervical Radiculopathy  Pain -  hot/cold therapy, splint/taping/bracing/orthotics, self management and education  Decreased ROM/flexibility - manual therapy and therapeutic exercise  Decreased strength - therapeutic exercise and therapeutic activities  Impaired muscle performance - neuro re-education  Decreased function - therapeutic activities  Impaired posture - neuro re-education    Therapy Evaluation Codes:   1) History comprised of:   Personal factors that impact the plan of care:      Profession, Time since onset of symptoms and Work status.    Comorbidity factors that impact the plan of care are:      myopic degeneration.     Medications impacting care: None.  2) Examination of Body Systems comprised of:   Body structures and functions that impact the plan of care:      Cervical spine and Shoulder.   Activity limitations that impact the plan of care are:      Lifting and Reading/Computer work.  3) Clinical presentation characteristics are:   Stable/Uncomplicated.  4) Decision-Making    Low complexity using standardized patient assessment instrument and/or  measureable assessment of functional outcome.  Cumulative Therapy Evaluation is: Low complexity.    Previous and current functional limitations:  (See Goal Flow Sheet for this information)    Short term and Long term goals: (See Goal Flow Sheet for this information)     Communication ability:  Patient appears to be able to clearly communicate and understand verbal and written communication and follow directions correctly.  Treatment Explanation - The following has been discussed with the patient:   RX ordered/plan of care  Anticipated outcomes  Possible risks and side effects  This patient would benefit from PT intervention to resume normal activities.   Rehab potential is good.    Frequency:  1 X week, once daily  Duration:  for 4 weeks  Discharge Plan:  Achieve all LTG.  Independent in home treatment program.  Reach maximal therapeutic benefit.    Please refer to the daily flowsheet for treatment today, total treatment time and time spent performing 1:1 timed codes.

## 2019-06-10 ENCOUNTER — THERAPY VISIT (OUTPATIENT)
Dept: PHYSICAL THERAPY | Facility: CLINIC | Age: 69
End: 2019-06-10
Payer: COMMERCIAL

## 2019-06-10 DIAGNOSIS — M54.12 CERVICAL RADICULOPATHY: Primary | ICD-10-CM

## 2019-06-10 PROCEDURE — 97112 NEUROMUSCULAR REEDUCATION: CPT | Mod: GP | Performed by: PHYSICAL THERAPIST

## 2019-06-10 PROCEDURE — 97110 THERAPEUTIC EXERCISES: CPT | Mod: GP | Performed by: PHYSICAL THERAPIST

## 2019-06-10 PROCEDURE — 97530 THERAPEUTIC ACTIVITIES: CPT | Mod: GP | Performed by: PHYSICAL THERAPIST

## 2019-07-08 ENCOUNTER — THERAPY VISIT (OUTPATIENT)
Dept: PHYSICAL THERAPY | Facility: CLINIC | Age: 69
End: 2019-07-08
Payer: COMMERCIAL

## 2019-07-08 DIAGNOSIS — M54.12 CERVICAL RADICULOPATHY: ICD-10-CM

## 2019-07-08 PROCEDURE — 97112 NEUROMUSCULAR REEDUCATION: CPT | Mod: GP | Performed by: PHYSICAL THERAPIST

## 2019-07-08 PROCEDURE — 97110 THERAPEUTIC EXERCISES: CPT | Mod: GP | Performed by: PHYSICAL THERAPIST

## 2019-07-11 ENCOUNTER — OFFICE VISIT (OUTPATIENT)
Dept: DERMATOLOGY | Facility: CLINIC | Age: 69
End: 2019-07-11
Attending: INTERNAL MEDICINE
Payer: COMMERCIAL

## 2019-07-11 DIAGNOSIS — R21 RASH AND NONSPECIFIC SKIN ERUPTION: ICD-10-CM

## 2019-07-11 RX ORDER — KETOCONAZOLE 20 MG/G
CREAM TOPICAL DAILY
Qty: 60 G | Refills: 5 | Status: SHIPPED | OUTPATIENT
Start: 2019-07-11 | End: 2021-09-01

## 2019-07-11 RX ORDER — TRIAMCINOLONE ACETONIDE 0.25 MG/G
CREAM TOPICAL 2 TIMES DAILY
Qty: 80 G | Refills: 3 | Status: SHIPPED | OUTPATIENT
Start: 2019-07-11 | End: 2020-12-31

## 2019-07-11 ASSESSMENT — PAIN SCALES - GENERAL: PAINLEVEL: NO PAIN (0)

## 2019-07-11 NOTE — PROGRESS NOTES
[facility] Dermatology Note      Dermatology Problem List:  1. 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 milial cyst, shaft of the penis, s/p I&D 1/11/19   3. Poorly demarcated erythematous patch involving gluteal cleft   -s/p skin culture 7/11/19  -triamcinolone cream, ketoconazole cream  -follow up, potential biopsy if not improved     Encounter Date: Jul 11, 2019    CC:   Chief Complaint   Patient presents with     Derm Problem     Jefry is here today for a rash that comes and go-pt believes it might be Psoriasis.     History of Present Illness:  Mr. Jefry Hatfield is a 68 year old male who presents in self referral for a rash involving the gluteal crease. He has no personal or family history of eczema. He is recently very active and is now retired. He does a lot of running, rowing, and biking. He may have sustained some frictional trauma to this area around 6-8 months ago and has tried various topicals including tea tree oil, other herbal medications, topical antibiotics over the counter, barrier creams, and Vaseline. The site is not itchy. Based on his Internet research he thinks this may be psoriasis. It is not itchy. He does not have a rash anywhere else on the body. The patient is well today in their baseline state of health, with no additional skin concerns.      Past Medical History:   Patient Active Problem List   Diagnosis     Vision changes     Senile nuclear sclerosis     Malignant myopia     Pseudophakia of both eyes - Both Eyes     Myopic degeneration     Anisometropia     Cervical radiculopathy     Xerostomia     Adenomatous polyp of colon     Past Medical History:   Diagnosis Date     Anisometropia      Cataract, right eye      Cervical radiculopathy      High myopia      Myopic degeneration      Posterior staphyloma     LE     Pseudophakia of left eye      Retinal detachment RE 1971,[id number]    BE     Past Surgical History:   Procedure Laterality Date      APPENDECTOMY       CATARACT IOL, RT/LT Bilateral 11/14/12LE 7/1/14RE    BE     COLONOSCOPY N/A 5/25/2018    Procedure: COLONOSCOPY;  Screening Colonoscopy;  Surgeon: Zak Kaplan MD;  [facility]     PHACOEMULSIFICATION CLEAR CORNEA WITH STANDARD INTRAOCULAR LENS IMPLANT  7/1/2014    Procedure: PHACOEMULSIFICATION CLEAR CORNEA WITH STANDARD INTRAOCULAR LENS IMPLANT;  Surgeon: Milan Bernardo MD;  [facility]     SCLERAL BUCKLE  1971    RE     SCLERAL BUCKLE  1980    LE     YAG CAPSULOTOMY OD (RIGHT EYE)  10/30/2014       Social History:  Patient reports that he has never smoked. He has never used smokeless tobacco. He reports that he drinks alcohol. He reports that he does not use drugs.    Family History:  Family History   Problem Relation Age of Onset     Dementia Mother      Heart Failure Father      Asthma Father      Diabetes Paternal Grandfather      Cancer No family hx of      Glaucoma No family hx of      Macular Degeneration No family hx of      Skin Cancer No family hx of      Melanoma No family hx of        Medications:  Current Outpatient Medications   Medication Sig Dispense Refill     CALCIUM-MAG-VIT C-VIT D PO Take by mouth 2 times daily       gabapentin (NEURONTIN) 100 MG capsule   1     nortriptyline (PAMELOR) 25 MG capsule 125 mg daily  2     Omega-3 Fatty Acids (OMEGA-3 FISH OIL PO) Take by mouth daily       Saw Palmetto, Serenoa repens, (SAW PALMETTO EXTRACT PO) Take by mouth daily       VITAMIN D, CHOLECALCIFEROL, PO Take by mouth daily       oxyCODONE IR (ROXICODONE) 5 MG tablet Take 1 tablet (5 mg) by mouth every 6 hours as needed for pain (Patient not taking: Reported on 5/21/2019) 15 tablet 0        Allergies   Allergen Reactions     Ranitidine Unknown         Review of Systems:  -As per HPI  -Constitutional: Otherwise feeling well today, in usual state of health.  -HEENT: Patient denies nonhealing oral sores.  -Skin: As above in HPI. No additional skin  concerns.    Physical exam:  Vitals: There were no vitals taken for this visit.  GEN: This is a well developed, well-nourished male in no acute distress, in a pleasant mood.    SKIN: Focused examination of the gluteal cleft/buttocks, penis and inguinal folds, sun exposed arms and hands, and upper bilateral thighs was performed.  -poorly demarcated erythematous/deep pink patch which is bilateral and symmetric over gluteal cleft with central area of maceration and faint white scale, no satellite lesions, no involvement of penis or inguinal folds, no nail changes, no purulent drainage or e/o impetiginization on exam.   -No other lesions of concern on areas examined.     Impression/Plan:  1. Gluteal cleft symmetric erythematous patch, appears to involve at least superficial dermis without significant notable epidermal change, however often this location is macerated, as is the case today, ddx. Contact dermatitis +/- ACD (2/2 topical over the counter antibiotic ointments, over OTC topicals, no history of MCI or MI wipes), mechanical/frictional dermatitis, +/- bacterial colonization, less likely extramammary paget's     Bacterial culture obtained from gluteal cleft, suspect secondary bacterial colonization    Topical ketoconazole cream and triamcinolone cream to use up to twice daily until resolved and follow up in 1-2 months. If not improving or worsening, recommend punch biopsy to r/o extramammary paget's    Recommended bleach baths 2-3 x weekly, hand out with recipe provided     CC Brooke Gonzalez MD  [address] [facility]  [place] [address] on close of this encounter.  Follow-up in 2 months, earlier for new or changing lesions.     Dr. Oro staffed the patient.    Staff Involved:  Resident(Cris Bernal)/Staff

## 2019-07-11 NOTE — PATIENT INSTRUCTIONS
"We would recommend topical ketoconazole cream and triamcinolone cream to use up to twice daily until resolved and follow up in 1-2 months. If you are not improved at that time we can proceed with a biopsy to determine a diagnosis if we can.    We will perform a culture today to see if there is bacteria colonizing the area.    We would like you to do bleach baths 2-3 x weekly:       [facility]  [address]  [place] [address]  [phone]    Dilute Bleach Bath Instructions    What are dilute bleach baths?  Dilute bleach baths are used to help fight bacteria that is commonly found on the skin; this bacteria may be preventing your skin from healing. If is also used to calm inflammation in skin, even if infection is not present. The dilution ratio we recommend is the same concentration that is in a swimming pool. This technique is safe and can help prevent your infant or child from needing oral antibiotics for basic staph bacteria on the skin.      Type of bleach:    Regular, plain, household bleach used for cleaning clothing. Brand or Generic is okay.     Make sure this is plain or concentrated bleach. The bleach bottle should not contain any of the following words \"pour safe, with fabric protection, with cloromax technology, splash free, splash less, gentle or color safe.\"     There should not be any added fragrance to the bleach; such a lavender.    How do I make a dilute bleach bath?    Pour 1/3 of concentrated bleach or 1/2 cup of plain of bleach into an adult size bath tub of 4-6 inches of lukewarm water.    For smaller tubs (infant size tubs), add two tablespoons of bleach to the tub water.     Bleach baths work better if your child is able to submerge most of their skin, so consider placing the infant tub in the larger tub.     Repeat bleach baths as recommended by your provider.    Other information:    Do not pour bleach directly onto the skin.    If is safe to get the bleach mixture on " your face and scalp.    Do not drink the bleach mixture.    Keep bleach bottle out of reach of children.

## 2019-07-11 NOTE — NURSING NOTE
Chief Complaint   Patient presents with     Derm Problem     Jefyr is here today for a rash that comes and go-pt believes it might be Psoriasis.     Jalyn Ashton LPN

## 2019-07-11 NOTE — LETTER
7/11/2019       RE: Jefry Hatfield  [address]  [place] [address]     Dear Colleague,    Thank you for referring your patient, Jefry Hatfield, to the [facility] DERMATOLOGY at [facility]. Please see a copy of my visit note below.    [facility] Dermatology Note      Dermatology Problem List:  1.  neurofibroma, central chest lesion , bx 1/11/19  2.  milial cyst, shaft of the penis, s/p I&D 1/11/19   3. Poorly demarcated erythematous patch involving gluteal cleft   -s/p skin culture 7/11/19  -triamcinolone cream, ketoconazole cream  -follow up, potential biopsy if not improved     Encounter Date: Jul 11, 2019    CC:   Chief Complaint   Patient presents with     Derm Problem     Jefry is here today for a rash that comes and go-pt believes it might be Psoriasis.     History of Present Illness:  Mr. Jefry Hatfield is a 68 year old male who presents in self referral for a rash involving the gluteal crease. He has no personal or family history of eczema. He is recently very active and is now retired. He does a lot of running, rowing, and biking. He may have sustained some frictional trauma to this area around 6-8 months ago and has tried various topicals including tea tree oil, other herbal medications, topical antibiotics over the counter, barrier creams, and Vaseline. The site is not itchy. Based on his Internet research he thinks this may be psoriasis. It is not itchy. He does not have a rash anywhere else on the body. The patient is well today in their baseline state of health, with no additional skin concerns.      Past Medical History:   Patient Active Problem List   Diagnosis     Vision changes     Senile nuclear sclerosis     Malignant myopia     Pseudophakia of both eyes - Both Eyes     Myopic degeneration     Anisometropia     Cervical radiculopathy     Xerostomia     Adenomatous polyp of colon     Past Medical History:    Diagnosis Date     Anisometropia      Cataract, right eye      Cervical radiculopathy      High myopia      Myopic degeneration      Posterior staphyloma     LE     Pseudophakia of left eye      Retinal detachment RE 1971,[id number]    BE     Past Surgical History:   Procedure Laterality Date     APPENDECTOMY       CATARACT IOL, RT/LT Bilateral 11/14/12LE 7/1/14RE    BE     COLONOSCOPY N/A 5/25/2018    Procedure: COLONOSCOPY;  Screening Colonoscopy;  Surgeon: Zak Kaplan MD;  [facility]: [facility]     PHACOEMULSIFICATION CLEAR CORNEA WITH STANDARD INTRAOCULAR LENS IMPLANT  7/1/2014    Procedure: PHACOEMULSIFICATION CLEAR CORNEA WITH STANDARD INTRAOCULAR LENS IMPLANT;  Surgeon: Milan Bernardo MD;  Location:  [facility]     SCLERAL BUCKLE  1971    RE     SCLERAL BUCKLE  1980    LE     YAG CAPSULOTOMY OD (RIGHT EYE)  10/30/2014       Social History:  Patient reports that he has never smoked. He has never used smokeless tobacco. He reports that he drinks alcohol. He reports that he does not use drugs.    Family History:  Family History   Problem Relation Age of Onset     Dementia Mother      Heart Failure Father      Asthma Father      Diabetes Paternal Grandfather      Cancer No family hx of      Glaucoma No family hx of      Macular Degeneration No family hx of      Skin Cancer No family hx of      Melanoma No family hx of        Medications:  Current Outpatient Medications   Medication Sig Dispense Refill     CALCIUM-MAG-VIT C-VIT D PO Take by mouth 2 times daily       gabapentin (NEURONTIN) 100 MG capsule   1     nortriptyline (PAMELOR) 25 MG capsule 125 mg daily  2     Omega-3 Fatty Acids (OMEGA-3 FISH OIL PO) Take by mouth daily       Saw [place], Serenoa repens, (SAW PALMETTO EXTRACT PO) Take by mouth daily       VITAMIN D, CHOLECALCIFEROL, PO Take by mouth daily       oxyCODONE IR (ROXICODONE) 5 MG tablet Take 1 tablet (5 mg) by mouth every 6 hours as needed for pain (Patient not taking: Reported on  5/21/2019) 15 tablet 0        Allergies   Allergen Reactions     Ranitidine Unknown         Review of Systems:  -As per HPI  -Constitutional: Otherwise feeling well today, in usual state of health.  -HEENT: Patient denies nonhealing oral sores.  -Skin: As above in HPI. No additional skin concerns.    Physical exam:  Vitals: There were no vitals taken for this visit.  GEN: This is a well developed, well-nourished male in no acute distress, in a pleasant mood.    SKIN: Focused examination of the gluteal cleft/buttocks, penis and inguinal folds, sun exposed arms and hands, and upper bilateral thighs was performed.  -poorly demarcated erythematous/deep pink patch which is bilateral and symmetric over gluteal cleft with central area of maceration and faint white scale, no satellite lesions, no involvement of penis or inguinal folds, no nail changes, no purulent drainage or e/o impetiginization on exam.   -No other lesions of concern on areas examined.     Impression/Plan:  1. Gluteal cleft symmetric erythematous patch, appears to involve at least superficial dermis without significant notable epidermal change, however often this location is macerated, as is the case today, ddx. Contact dermatitis +/- ACD (2/2 topical over the counter antibiotic ointments, over OTC topicals, no history of MCI or MI wipes), mechanical/frictional dermatitis, +/- bacterial colonization, less likely extramammary paget's     Bacterial culture obtained from gluteal cleft, suspect secondary bacterial colonization    Topical ketoconazole cream and triamcinolone cream to use up to twice daily until resolved and follow up in 1-2 months. If not improving or worsening, recommend punch biopsy to r/o extramammary paget's    Recommended bleach baths 2-3 x weekly, hand out with recipe provided     CC Brooke Gonzalez MD  [address]  [place] [address] on close of this encounter.  Follow-up in 2 months, earlier for new or  changing lesions.     Dr. Oro staffed the patient.    Staff Involved:  Resident(Cris Bernal)/Staff      Again, thank you for allowing me to participate in the care of your patient.      Sincerely,    Cris Bernal MD

## 2019-07-13 LAB
BACTERIA SPEC CULT: ABNORMAL
Lab: ABNORMAL
SPECIMEN SOURCE: ABNORMAL

## 2019-07-15 ENCOUNTER — TELEPHONE (OUTPATIENT)
Dept: DERMATOLOGY | Facility: CLINIC | Age: 69
End: 2019-07-15

## 2019-07-15 DIAGNOSIS — L08.9 SKIN INFECTION: Primary | ICD-10-CM

## 2019-07-15 RX ORDER — MUPIROCIN 20 MG/G
OINTMENT TOPICAL
Qty: 30 G | Refills: 1 | Status: SHIPPED | OUTPATIENT
Start: 2019-07-15 | End: 2019-09-03

## 2019-07-15 NOTE — TELEPHONE ENCOUNTER
[facility] Call Center    Phone Message    May a detailed message be left on voicemail: yes    Reason for Call: Other: Jefry calling to request a call back. He would like someone to explain his test results 07/11. Please call him back to discuss.      Action Taken: Message routed to:  [facility]

## 2019-07-15 NOTE — RESULT ENCOUNTER NOTE
E coli noted which is to be expected at this body site. Light growth S. Aureus. Called patient this morning and relayed results. Will send mupirocin ointment to use twice daily to site until follow up.     Respectfully,    Cris Bernal  PGY-3 Dermatology Resident  [facility] Department of Dermatology

## 2019-07-22 ENCOUNTER — ANCILLARY PROCEDURE (OUTPATIENT)
Dept: GENERAL RADIOLOGY | Facility: CLINIC | Age: 69
End: 2019-07-22
Attending: FAMILY MEDICINE
Payer: COMMERCIAL

## 2019-07-22 ENCOUNTER — OFFICE VISIT (OUTPATIENT)
Dept: ORTHOPEDICS | Facility: CLINIC | Age: 69
End: 2019-07-22
Payer: COMMERCIAL

## 2019-07-22 VITALS
DIASTOLIC BLOOD PRESSURE: 84 MMHG | BODY MASS INDEX: 22.56 KG/M2 | WEIGHT: 166.54 LBS | SYSTOLIC BLOOD PRESSURE: 137 MMHG | HEIGHT: 72 IN

## 2019-07-22 DIAGNOSIS — M79.605 LEFT LEG PAIN: Primary | ICD-10-CM

## 2019-07-22 DIAGNOSIS — M79.605 LEFT LEG PAIN: ICD-10-CM

## 2019-07-22 ASSESSMENT — MIFFLIN-ST. JEOR: SCORE: 1573.42

## 2019-07-22 NOTE — PROGRESS NOTES
SPORTS & ORTHOPEDIC WALK-IN VISIT 7/22/2019    Primary Care Physician: Dr. Gonzalez     Reason for visit:     What part of your body is injured / painful?  left hip    What caused the injury /pain? Running     How long ago did your injury occur or pain begin? Within last week     What are your most bothersome symptoms? Pain    How would you characterize your symptom?  Soreness,     What makes your symptoms better? Nothing    What makes your symptoms worse? Running,     Have you been previously seen for this problem? No    Medical History:    Any recent changes to your medical history? No    Any new medication prescribed since last visit? No    Have you had surgery on this body part before? No    Social History:    Occupation: Retired     Handedness: Right    Exercise: Cardiovascular: Running Miles per week: 10 miles     Review of Systems:    Do you have fever, chills, weight loss? No    Do you have any vision problems? No     Do you have any chest pain or edema? No    Do you have any shortness of breath or wheezing?  No    Do you have stomach problems? No    Do you have any numbness or focal weakness? No    Do you have diabetes? No    Do you have problems with bleeding or clotting? No    Do you have an rashes or other skin lesions? No

## 2019-07-22 NOTE — PROGRESS NOTES
[facility]  Orthopedics  Gregory Bryant MD  2019     Name: Jefry Hatfield  MRN: [id number]  Age: 68 year old  : 1950  Referring provider: [facility]*     Chief Complaint: Pain of the Left Hip    Date of Onset: Within the last week     History of Present Illness:   Jefry Hatfield is a 68 year old, male with a history of sciatica and IT band syndrome who presents today for evaluation of left hip and leg pain. The patient reports he typically runs 7-8 miles a week but has been running more frequently over the last week with his son and a volunteer program that he is apart of. After his run on 2019 he started experiencing pain in soreness in left hip that radiates down the lateral thigh and into the posterior calf. The soreness is constant but the pain is only present with running. He has taken ibuprofen for pain relief but does not regularly because it irritates his stomach. He states this pain is similar but not the same as his previous sciatica pain. He used orthotics to manage this pain. He voices no further concerns at this time.     Review of Systems:   A 10-point review of systems was obtained and is negative except for as noted in the HPI.     Medications:     Current Outpatient Medications:      CALCIUM-MAG-VIT C-VIT D PO, Take by mouth 2 times daily, Disp: , Rfl:      gabapentin (NEURONTIN) 100 MG capsule, , Disp: , Rfl: 1     ketoconazole (NIZORAL) 2 % external cream, Apply topically daily, Disp: 60 g, Rfl: 5     mupirocin (BACTROBAN) 2 % external ointment, Use 2 times a day to gluteal cleft., Disp: 30 g, Rfl: 1     nortriptyline (PAMELOR) 25 MG capsule, 125 mg daily, Disp: , Rfl: 2     Omega-3 Fatty Acids (OMEGA-3 FISH OIL PO), Take by mouth daily, Disp: , Rfl:      oxyCODONE IR (ROXICODONE) 5 MG tablet, Take 1 tablet (5 mg) by mouth every 6 hours as needed for pain (Patient not taking: Reported on 2019), Disp: 15 tablet, Rfl: 0     Saw [place], Serenoa  repens, (SAW PALMETTO EXTRACT PO), Take by mouth daily, Disp: , Rfl:      triamcinolone (KENALOG) 0.025 % cream, Apply topically 2 times daily, Disp: 80 g, Rfl: 3     VITAMIN D, CHOLECALCIFEROL, PO, Take by mouth daily, Disp: , Rfl:     Allergies:  Ranitidine     Past Medical History:  Anisometropia   Cataracts, right eye  Cervical radiculopathy   Myopic degeneration   Posterior staphyloma  Pseudophakia of left eye  Retinal detachment     Past Surgical History:  Appendectomy   Cataracts, bilateral   Colonoscopy   Scleral buckle (RE and LE) - 1971 and 1980  TAG capsulotomy      Social History:  Patient is .  Works as an  at [employer]. He denies tobacco use and admits to occasional alcohol use.     Family History:  Mother - dementia  Father - heart failure, asthma  Paternal grandfather - diabetes     Physical Examination:  Blood pressure 137/84, height 1.829 m (6'), weight 75.5 kg (166 lb 8.6 oz).    General: alert, pleasant, no distress. sitting comfortably in chair  Back/Spine: no tenderness to palpation of spinous processes, or paraspinous musculature of lumbar spine. full ROM with flexion, extension, twisting, and side bending without pain. Slump test positive on left.  Mild hamstring tightness noted. No  pain in back with ZOLTAN testing bilaterally  Neuro: SILT on bilateral lower extremities, can stand on toes and heel walk without difficulty, patellar and achilles reflexes 2+ and symmetric, babinski downgoing bilaterally       Imaging:   Radiographs of the lumbar spine - 2/3 views (07/22/2019)  Degenerative disease at lumbar spine. Worse at L4-L5 and L5-S1.  See EMR for formal radiology report.    I have independently reviewed the above imaging studies; the results were discussed with the patient.     Assessment:   68 year old, male with left leg pain, likely radicular from lumbar spine.    Plan:   Reviewed the imaging with the patient in detail.   Provided a home exercise program/PT referral for  lower back strengthening.  Discussed course of prednisone to reduce acute symptoms. Patient will call if he would like to try this.   nsaids prn.   Follow-up with me in a few weeks if his symptoms do not improve at which time may consider advanced imaging of the lumbar spine.    Gregory Bryant MD      Scribe Disclosure:  I, David Henderson, am serving as a scribe to document services personally performed by Gregory Bryant MD at this visit, based upon the provider's statements to me. All documentation has been reviewed by the aforementioned provider prior to being entered into the official medical record.

## 2019-07-25 NOTE — PROGRESS NOTES
I talked with and examined Jefry Hatfield and I agree with the assessment and the plan. VERÓNICA Oro MD.

## 2019-08-12 ENCOUNTER — MYC REFILL (OUTPATIENT)
Dept: DERMATOLOGY | Facility: CLINIC | Age: 69
End: 2019-08-12

## 2019-08-12 DIAGNOSIS — L08.9 SKIN INFECTION: ICD-10-CM

## 2019-08-12 RX ORDER — MUPIROCIN 20 MG/G
OINTMENT TOPICAL
Qty: 30 G | Refills: 1 | Status: CANCELLED | OUTPATIENT
Start: 2019-08-12

## 2019-08-23 ENCOUNTER — THERAPY VISIT (OUTPATIENT)
Dept: PHYSICAL THERAPY | Facility: CLINIC | Age: 69
End: 2019-08-23
Attending: FAMILY MEDICINE
Payer: COMMERCIAL

## 2019-08-23 DIAGNOSIS — M79.652 PAIN OF LEFT THIGH: ICD-10-CM

## 2019-08-23 DIAGNOSIS — M54.16 LUMBAR RADICULOPATHY: ICD-10-CM

## 2019-08-23 DIAGNOSIS — M79.605 LEFT LEG PAIN: ICD-10-CM

## 2019-08-23 PROCEDURE — 97110 THERAPEUTIC EXERCISES: CPT | Mod: GP | Performed by: PHYSICAL THERAPIST

## 2019-08-23 PROCEDURE — 97161 PT EVAL LOW COMPLEX 20 MIN: CPT | Mod: GP | Performed by: PHYSICAL THERAPIST

## 2019-08-23 PROCEDURE — 97530 THERAPEUTIC ACTIVITIES: CPT | Mod: GP | Performed by: PHYSICAL THERAPIST

## 2019-08-23 NOTE — PROGRESS NOTES
Physical Therapy Initial Evaluation: Subjective History  August 23, 2019     Injury/Condition Details  Presenting Complaint L thigh pain, soreness in left hip that radiates down the lateral thigh and into the posterior calf.  Runs 7-8mi on Saturdays (very slow). Added a Tues run with a former prisoners group. Son also came back to town and began running with him another night.   Also had a 3,000mi road trip in the past few months and feels that couldn't have been good for his back.    Onset Timing/Date 7/17/19   Mechanism Running     Symptom Behavior Details    Primary Symptoms Constant symptoms; worsen with activity   Worst Pain 7/10 (with sitting prolonged)   Symptom Provocators Running, sitting prolonged periods of time   Best Pain 0/10    Symptom Relievers Ibuprofen (limited use)   Time of day dependent? No   Recent symptom change? no change in symptoms     Prior Testing/Intervention for Current Condition  Prior Tests  Xray lumbar spine showing mild DDD L4-5, L5-S1  None for hip   Prior Treatment medication     Lifestyle & General Medical History  Employment Retired  from [employer]   Usual physical activities  (within past year) Runs 7-8 miles per week   Orthopaedic history History of ITB symptoms and sciatica symptoms in the past.    Notable medical history Refer to EMR   Patient Reported Health excellent       Objective:  LUMBAR EXAMINATION    Posture: Mild loss of lumbar lordosis. Increased thoracic kyphosis with subsequent FHON posture (moderate degree)    Baseline sx's: L LBP  Active ROM ROM   Flexion Full, no effect   Extension Min loss, no effect    L R   Rotation     Sidebend Min loss, no effect Full, mild L LBP   Sideglide     Repeated flexion seated: no effect  REIL: abolish sx's    Dural Signs:   L R   SLR Equivocal -   Slump - -     HIP RANGE OF MOTION SCREEN  (* = patient s pain)   PROM L PROM R   Hip Flx 130 130   Hip IR 90/90 45 45   Hip ER 90/90 50 50   Hip ABD na na    Hip Ext 15 15     Non-tender to palpation bilateral QL or lumbar paraspinals    Assessment/Plan:    The patient is a 68 year old male with chief complaint of L leg pain.    The patient has the following significant findings with corresponding treatment plan.  Diagnosis 1:  L hip/leg pain, lumbar radiculopathy    Pain -  hot/cold therapy, manual therapy, splint/taping/bracing/orthotics, self management, education, directional preference exercise and home program  Decreased ROM/flexibility - manual therapy, therapeutic exercise and home program  Decreased joint mobility - manual therapy, therapeutic exercise and home program  Decreased strength - therapeutic exercise, therapeutic activities and home program  Impaired balance - neuro re-education, therapeutic activities and home program  Decreased proprioception - neuro re-education and therapeutic activities  Impaired gait - gait training and assistive devices  Impaired muscle performance - neuro re-education and home program  Decreased function - therapeutic activities and home program  Impaired posture - neuro re-education, therapeutic activities and home program      Therapy Evaluation Codes:   1) History comprised of:   Personal factors that impact the plan of care:      Please refer to health history in EMR.    Comorbidity factors that impact the plan of care are:      Please refer to health history in EMR.     Medications impacting care: None.  2) Examination of Body Systems comprised of:   Body structures and functions that impact the plan of care:      Lumbar spine and thigh.   Activity limitations that impact the plan of care are:      Running and Sitting.   Clinical presentation characteristics are:    Stable/Uncomplicated.  3) Presentation comprised of:   Presentation scored as Low complexity with uncomplicated characteristics..  4) Decision-Making    Low complexity using standardized patient assessment instrument and/or measureable assessment of  functional outcome.  Cumulative Therapy Evaluation is: Low complexity.    Previous and current functional limitations:  (See Goal Flow Sheet for this information)    Short term and Long term goals: (See Goal Flow Sheet for this information)     Communication ability:  Patient appears to be able to clearly communicate and understand verbal and written communication and follow directions correctly.  Treatment Explanation - The following has been discussed with the patient: RX ordered/plan of care, anticipated outcomes, and possible risks and side effects.  This patient would benefit from PT intervention to resume normal activities.   Rehab potential is good.    Frequency:  1 X week, once daily  Duration:  for 3 weeks tapering to 1x/month for 2 months  Discharge Plan: Achieve all LTGs, be independent in home treatment program, and reach maximal therapeutic benefit.    Please refer to the daily flowsheet for treatment today, total treatment time and time spent performing 1:1 timed codes.

## 2019-08-30 ENCOUNTER — THERAPY VISIT (OUTPATIENT)
Dept: PHYSICAL THERAPY | Facility: CLINIC | Age: 69
End: 2019-08-30
Payer: COMMERCIAL

## 2019-08-30 DIAGNOSIS — M54.16 LUMBAR RADICULOPATHY: ICD-10-CM

## 2019-08-30 DIAGNOSIS — M79.652 PAIN OF LEFT THIGH: ICD-10-CM

## 2019-08-30 PROCEDURE — 97530 THERAPEUTIC ACTIVITIES: CPT | Mod: GP | Performed by: PHYSICAL THERAPIST

## 2019-08-30 PROCEDURE — 97112 NEUROMUSCULAR REEDUCATION: CPT | Mod: GP | Performed by: PHYSICAL THERAPIST

## 2019-08-30 PROCEDURE — 97110 THERAPEUTIC EXERCISES: CPT | Mod: GP | Performed by: PHYSICAL THERAPIST

## 2019-09-12 ENCOUNTER — OFFICE VISIT (OUTPATIENT)
Dept: DERMATOLOGY | Facility: CLINIC | Age: 69
End: 2019-09-12
Payer: COMMERCIAL

## 2019-09-12 DIAGNOSIS — R21 RASH: Primary | ICD-10-CM

## 2019-09-12 ASSESSMENT — PAIN SCALES - GENERAL
PAINLEVEL: NO PAIN (0)
PAINLEVEL: NO PAIN (0)

## 2019-09-12 NOTE — NURSING NOTE
Lidocaine-epinephrine 1-1:024689 % injection   1 mL once for one use, starting 9/12/2019 ending 9/12/2019,  2mL disp, R-0, injection  Injected by Dr. Bernal

## 2019-09-12 NOTE — PROGRESS NOTES
[facility] Dermatology Note    Dermatology Problem List:  1. 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 milial cyst, shaft of the penis, s/p I&D 1/11/19   3. Poorly demarcated erythematous patch involving gluteal cleft   -s/p skin culture 7/11/19  -triamcinolone cream, ketoconazole cream, mupirocin  -s/p punch biopsy 9/12/19, results pending, ddx. ACD vs eczema vs psoriasis >> Pagets vs. Other     Encounter Date: Sep 12, 2019    CC:   Chief Complaint   Patient presents with     Derm Problem     Jefry is here today for a rash follow up. Pt states he has had improvment since last visit.      History of Present Illness:  Mr. Jefry Hatfield is a 68 year old male who presents as a follow-up for gluteal cleft rash. The patient was last seen 7/11/19 when we recommended ketoconazole cream, triamcinolone cream, and performed skin culture for which we then recommended topical mupirocin. The patient believes since he started the mupirocin he and his wife felt the rash became a little less red and he thinks he his overall improved. He notes a burning sensation in this area when he sits. he likes to be active. He swims once a week, bikes once a week, and used to enjoy rowing but has held off on rowing. The patient is well today in their baseline state of health, with no additional skin concerns.      Past Medical History:   Patient Active Problem List   Diagnosis     Vision changes     Senile nuclear sclerosis     Malignant myopia     Pseudophakia of both eyes - Both Eyes     Myopic degeneration     Anisometropia     Cervical radiculopathy     Xerostomia     Adenomatous polyp of colon     Lumbar radiculopathy     Pain of left thigh     Past Medical History:   Diagnosis Date     Anisometropia      Cataract, right eye      Cervical radiculopathy      High myopia      Myopic degeneration      Posterior staphyloma     LE     Pseudophakia of left eye      Retinal detachment RE 1971,[id number]    BE      Past Surgical History:   Procedure Laterality Date     APPENDECTOMY       CATARACT IOL, RT/LT Bilateral 11/14/12LE 7/1/14RE    BE     COLONOSCOPY N/A 5/25/2018    Procedure: COLONOSCOPY;  Screening Colonoscopy;  Surgeon: Zak Kaplan MD;  [facility]     PHACOEMULSIFICATION CLEAR CORNEA WITH STANDARD INTRAOCULAR LENS IMPLANT  7/1/2014    Procedure: PHACOEMULSIFICATION CLEAR CORNEA WITH STANDARD INTRAOCULAR LENS IMPLANT;  Surgeon: Milan Bernardo MD;  [facility]     SCLERAL BUCKLE  1971    RE     SCLERAL BUCKLE  1980    LE     YAG CAPSULOTOMY OD (RIGHT EYE)  10/30/2014       Social History:  Patient reports that he has never smoked. He has never used smokeless tobacco. He reports that he drinks alcohol. He reports that he does not use drugs.    Family History:  Family History   Problem Relation Age of Onset     Dementia Mother      Heart Failure Father      Asthma Father      Diabetes Paternal Grandfather      Cancer No family hx of      Glaucoma No family hx of      Macular Degeneration No family hx of      Skin Cancer No family hx of      Melanoma No family hx of        Medications:  Current Outpatient Medications   Medication Sig Dispense Refill     CALCIUM-MAG-VIT C-VIT D PO Take by mouth 2 times daily       gabapentin (NEURONTIN) 100 MG capsule   1     ketoconazole (NIZORAL) 2 % external cream Apply topically daily 60 g 5     mupirocin (BACTROBAN) 2 % external ointment Use 2 times a day to gluteal cleft. 30 g 1     nortriptyline (PAMELOR) 25 MG capsule 125 mg daily  2     Omega-3 Fatty Acids (OMEGA-3 FISH OIL PO) Take by mouth daily       Saw Palmetto, Serenoa repens, (SAW PALMETTO EXTRACT PO) Take by mouth daily       triamcinolone (KENALOG) 0.025 % cream Apply topically 2 times daily 80 g 3     VITAMIN D, CHOLECALCIFEROL, PO Take by mouth daily       oxyCODONE IR (ROXICODONE) 5 MG tablet Take 1 tablet (5 mg) by mouth every 6 hours as needed for pain (Patient not taking: Reported on  5/21/2019) 15 tablet 0        Allergies   Allergen Reactions     Ranitidine Unknown         Review of Systems:  -As per HPI  -Constitutional: Otherwise feeling well today, in usual state of health.  -Skin: As above in HPI. No additional skin concerns.    Physical exam:  Vitals: There were no vitals taken for this visit.  GEN: This is a well developed, well-nourished male in no acute distress, in a pleasant mood.    SKIN: Focused examination of the buttocks, scrotum, and penis was performed.  -Atris skin type: II  -bilateral symmetric erythematous plaques without significant maceration but with mild lichenification along medial cleft bilaterally. No e/o impetiginization or scale today.  -No other lesions of concern on areas examined.     Impression/Plan:  1. Rash, gluteal cleft, recalcitrant to topical triamcinolone cream, ketoconazole cream, and mupirocin ointment. Although patient feels he is improving, exam is stable since last visit. This has been ongoing for a year or more.     After discussion of benefits and risks including but not limited to bleeding, infection, scar, incomplete removal, recurrence, and non-diagnostic biopsy, written consent and photographs were obtained. The area was cleaned with isopropyl alcohol. 1mL of 1% lidocaine with epinephrine was injected to obtain adequate anesthesia of the lesion on the left gluteal cleft. A 4 mm punch biopsy was performed.  4-0 prolene sutures were utilized to approximate the epidermal edges.  White petroleum jelly/VaselineTM and a bandage was applied to the wound.  Explicit verbal and written wound care instructions were provided.  The patient left the Dermatology Clinic in good condition.    Continue topical mupirocin BID. Further recommendations following results of biopsy.       CC Brooke Gonzalez MD  [address] [facility] [address]  [place] [address] on close of this encounter.  Follow-up in 2 months, earlier for new or changing lesions.      Dr. Lux staffed the patient.    Staff Involved:  Resident(Cris Bernal)/Staff  I was present for key portions of the procedure. Chiara Lux MD  I, Chiara Lux MD, saw this patient with the resident and agree with the resident s findings and plan of care as documented in the resident s note.

## 2019-09-12 NOTE — PATIENT INSTRUCTIONS
Wound Care After a Biopsy    What is a skin biopsy?  A skin biopsy allows the doctor to examine a very small piece of tissue under the microscope to determine the diagnosis and the best treatment for the skin condition. A local anesthetic (numbing medicine)  is injected with a very small needle into the skin area to be tested. A small piece of skin is taken from the area. Sometimes a suture (stitch) is used.     What are the risks of a skin biopsy?  I will experience scar, bleeding, swelling, pain, crusting and redness. I may experience incomplete removal or recurrence. Risks of this procedure are excessive bleeding, bruising, infection, nerve damage, numbness, thick (hypertrophic or keloidal) scar and non-diagnostic biopsy.    How should I care for my wound for the first 24 hours?    Keep the wound dry and covered for 24 hours    If it bleeds, hold direct pressure on the area for 15 minutes. If bleeding does not stop then go to the emergency room    Avoid strenuous exercise the first 1-2 days or as your doctor instructs you    How should I care for the wound after 24 hours?    After 24 hours, remove the bandage    You may bathe or shower as normal    If you had a scalp biopsy, you can shampoo as usual and can use shower water to clean the biopsy site daily    Clean the wound twice a day with gentle soap and water    Do not scrub, be gentle    Apply white petroleum/Vaseline after cleaning the wound with a cotton swab or a clean finger, and keep the site covered with a Bandaid /bandage. Bandages are not necessary with a scalp biopsy    If you are unable to cover the site with a Bandaid /bandage, re-apply ointment 2-3 times a day to keep the site moist. Moisture will help with healing    Avoid strenuous activity for first 1-2 days    Avoid lakes, rivers, pools, and oceans until the stitches are removed or the site is healed    How do I clean my wound?    Wash hands thoroughly with soap or use hand  before all  wound care    Clean the wound with gentle soap and water    Apply white petroleum/Vaseline  to wound after it is clean    Replace the Bandaid /bandage to keep the wound covered for the first few days or as instructed by your doctor    If you had a scalp biopsy, warm shower water to the area on a daily basis should suffice    What should I use to clean my wound?     Cotton-tipped applicators (Qtips )    Please apply mupirocin twice daily. Hold off on triamcinolone and ketoconazole for now.     Bandaids   as needed    Gentle soap     How should I care for my wound long term?    Do not get your wound dirty    Keep up with wound care for one week or until the area is healed.    A small scab will form and fall off by itself when the area is completely healed. The area will be red and will become pink in color as it heals. Sun protection is very important for how your scar will turn out. Sunscreen with an SPF 30 or greater is recommended once the area is healed.    If you have stitches, stitches need to be removed in 14 days. You may return to our clinic for this or you may have it done locally at your doctor s office.    You should have some soreness but it should be mild and slowly go away over several days. Talk to your doctor about using tylenol for pain,    When should I call my doctor?  If you have increased:     Pain or swelling    Pus or drainage (clear or slightly yellow drainage is ok)    Temperature over 100F    Spreading redness or warmth around wound    When will I hear about my results?  The biopsy results can take 2-3 weeks to come back. The clinic will call you with the results, send you a [employer] message, or have you schedule a follow-up clinic or phone time to discuss the results. Contact our clinics if you do not hear from us in 3 weeks.     Who should I call with questions?    [facility]: [phone]     [facility]: [phone]    For  urgent needs outside of business hours call the [facility] at [phone] and ask for the dermatology resident on call

## 2019-09-12 NOTE — NURSING NOTE
Chief Complaint   Patient presents with     Derm Problem     Jefry is here today for a rash follow up. Pt states he has had improvment since last visit.      Jalyn Ashton LPN

## 2019-09-12 NOTE — LETTER
9/12/2019       RE: Jefry Hatfield  [address]  [place] [address]     Dear Colleague,    Thank you for referring your patient, Jerfy Hatfield, to the [facility] DERMATOLOGY at [facility]. Please see a copy of my visit note below.    [facility] Dermatology Note    Dermatology Problem List:  1. 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 milial cyst, shaft of the penis, s/p I&D 1/11/19   3. Poorly demarcated erythematous patch involving gluteal cleft   -s/p skin culture 7/11/19  -triamcinolone cream, ketoconazole cream, mupirocin  -s/p punch biopsy 9/12/19, results pending, ddx. ACD vs eczema vs psoriasis >> Pagets vs. Other     Encounter Date: Sep 12, 2019    CC:   Chief Complaint   Patient presents with     Derm Problem     Jefry is here today for a rash follow up. Pt states he has had improvment since last visit.      History of Present Illness:  Mr. Jefry Hatfield is a 68 year old male who presents as a follow-up for gluteal cleft rash. The patient was last seen 7/11/19 when we recommended ketoconazole cream, triamcinolone cream, and performed skin culture for which we then recommended topical mupirocin. The patient believes since he started the mupirocin he and his wife felt the rash became a little less red and he thinks he his overall improved. He notes a burning sensation in this area when he sits. he likes to be active. He swims once a week, bikes once a week, and used to enjoy rowing but has held off on rowing. The patient is well today in their baseline state of health, with no additional skin concerns.      Past Medical History:   Patient Active Problem List   Diagnosis     Vision changes     Senile nuclear sclerosis     Malignant myopia     Pseudophakia of both eyes - Both Eyes     Myopic degeneration     Anisometropia     Cervical radiculopathy     Xerostomia     Adenomatous polyp of colon     Lumbar  radiculopathy     Pain of left thigh     Past Medical History:   Diagnosis Date     Anisometropia      Cataract, right eye      Cervical radiculopathy      High myopia      Myopic degeneration      Posterior staphyloma     LE     Pseudophakia of left eye      Retinal detachment RE 1971,[id number]    BE     Past Surgical History:   Procedure Laterality Date     APPENDECTOMY       CATARACT IOL, RT/LT Bilateral 11/14/12LE 7/1/14RE    BE     COLONOSCOPY N/A 5/25/2018    Procedure: COLONOSCOPY;  Screening Colonoscopy;  Surgeon: Zak Kaplan MD;  [facility]     PHACOEMULSIFICATION CLEAR CORNEA WITH STANDARD INTRAOCULAR LENS IMPLANT  7/1/2014    Procedure: PHACOEMULSIFICATION CLEAR CORNEA WITH STANDARD INTRAOCULAR LENS IMPLANT;  Surgeon: Milan Bernardo MD;  [facility]: [facility]     SCLERAL BUCKLE  1971    RE     SCLERAL BUCKLE  1980    LE     YAG CAPSULOTOMY OD (RIGHT EYE)  10/30/2014       Social History:  Patient reports that he has never smoked. He has never used smokeless tobacco. He reports that he drinks alcohol. He reports that he does not use drugs.    Family History:  Family History   Problem Relation Age of Onset     Dementia Mother      Heart Failure Father      Asthma Father      Diabetes Paternal Grandfather      Cancer No family hx of      Glaucoma No family hx of      Macular Degeneration No family hx of      Skin Cancer No family hx of      Melanoma No family hx of        Medications:  Current Outpatient Medications   Medication Sig Dispense Refill     CALCIUM-MAG-VIT C-VIT D PO Take by mouth 2 times daily       gabapentin (NEURONTIN) 100 MG capsule   1     ketoconazole (NIZORAL) 2 % external cream Apply topically daily 60 g 5     mupirocin (BACTROBAN) 2 % external ointment Use 2 times a day to gluteal cleft. 30 g 1     nortriptyline (PAMELOR) 25 MG capsule 125 mg daily  2     Omega-3 Fatty Acids (OMEGA-3 FISH OIL PO) Take by mouth daily       Saw Palmetto, Serenoa repens, (SAW PALMETTO EXTRACT  PO) Take by mouth daily       triamcinolone (KENALOG) 0.025 % cream Apply topically 2 times daily 80 g 3     VITAMIN D, CHOLECALCIFEROL, PO Take by mouth daily       oxyCODONE IR (ROXICODONE) 5 MG tablet Take 1 tablet (5 mg) by mouth every 6 hours as needed for pain (Patient not taking: Reported on 5/21/2019) 15 tablet 0        Allergies   Allergen Reactions     Ranitidine Unknown         Review of Systems:  -As per HPI  -Constitutional: Otherwise feeling well today, in usual state of health.  -Skin: As above in HPI. No additional skin concerns.    Physical exam:  Vitals: There were no vitals taken for this visit.  GEN: This is a well developed, well-nourished male in no acute distress, in a pleasant mood.    SKIN: Focused examination of the buttocks, scrotum, and penis was performed.  -Artis skin type: II  -bilateral symmetric erythematous plaques without significant maceration but with mild lichenification along medial cleft bilaterally. No e/o impetiginization or scale today.  -No other lesions of concern on areas examined.     Impression/Plan:  1. Rash, gluteal cleft, recalcitrant to topical triamcinolone cream, ketoconazole cream, and mupirocin ointment. Although patient feels he is improving, exam is stable since last visit. This has been ongoing for a year or more.     After discussion of benefits and risks including but not limited to bleeding, infection, scar, incomplete removal, recurrence, and non-diagnostic biopsy, written consent and photographs were obtained. The area was cleaned with isopropyl alcohol. 1mL of 1% lidocaine with epinephrine was injected to obtain adequate anesthesia of the lesion on the left gluteal cleft. A 4 mm punch biopsy was performed.  4-0 prolene sutures were utilized to approximate the epidermal edges.  White petroleum jelly/VaselineTM and a bandage was applied to the wound.  Explicit verbal and written wound care instructions were provided.  The patient left the  Dermatology Clinic in good condition.    Continue topical mupirocin BID. Further recommendations following results of biopsy.       CC Brooke Gonzalez MD  [address] [facility] [address]  [place] [address] on close of this encounter.  Follow-up in 2 months, earlier for new or changing lesions.     Dr. Lux staffed the patient.    Staff Involved:  Resident(Cris Bernal)/Staff  I was present for key portions of the procedure. Chiara Lux MD  I, Chiara Lux MD, saw this patient with the resident and agree with the resident s findings and plan of care as documented in the resident s note.    Again, thank you for allowing me to participate in the care of your patient.      Sincerely,    Cris Bernal MD

## 2019-09-17 ENCOUNTER — THERAPY VISIT (OUTPATIENT)
Dept: PHYSICAL THERAPY | Facility: CLINIC | Age: 69
End: 2019-09-17
Payer: COMMERCIAL

## 2019-09-17 DIAGNOSIS — M79.652 PAIN OF LEFT THIGH: ICD-10-CM

## 2019-09-17 DIAGNOSIS — M54.16 LUMBAR RADICULOPATHY: ICD-10-CM

## 2019-09-17 PROCEDURE — 97530 THERAPEUTIC ACTIVITIES: CPT | Mod: GP | Performed by: PHYSICAL THERAPIST

## 2019-09-17 PROCEDURE — 97112 NEUROMUSCULAR REEDUCATION: CPT | Mod: GP | Performed by: PHYSICAL THERAPIST

## 2019-09-17 PROCEDURE — 97110 THERAPEUTIC EXERCISES: CPT | Mod: GP | Performed by: PHYSICAL THERAPIST

## 2019-09-18 LAB — COPATH REPORT: NORMAL

## 2019-09-20 ENCOUNTER — MYC MEDICAL ADVICE (OUTPATIENT)
Dept: INTERNAL MEDICINE | Facility: CLINIC | Age: 69
End: 2019-09-20

## 2019-09-26 ENCOUNTER — ALLIED HEALTH/NURSE VISIT (OUTPATIENT)
Dept: DERMATOLOGY | Facility: CLINIC | Age: 69
End: 2019-09-26
Payer: COMMERCIAL

## 2019-09-26 DIAGNOSIS — L28.0 LICHEN SIMPLEX CHRONICUS: Primary | ICD-10-CM

## 2019-09-26 DIAGNOSIS — Z48.02 VISIT FOR SUTURE REMOVAL: Primary | ICD-10-CM

## 2019-09-26 RX ORDER — TACROLIMUS 1 MG/G
OINTMENT TOPICAL 2 TIMES DAILY
Qty: 60 G | Refills: 3 | Status: SHIPPED | OUTPATIENT
Start: 2019-09-26 | End: 2019-10-31

## 2019-09-26 NOTE — RESULT ENCOUNTER NOTE
Discussed results with patient and plan to use protopic twice daily until follow up at the end of next month. OK to continue bleach baths 2x weekly. Results and plan staffed with Dr. Bro.     Respectfully,    Cris Bernal  PGY-3 Dermatology Resident  [facility] Department of Dermatology

## 2019-09-26 NOTE — PROGRESS NOTES
Jefry Hatfield comes into clinic today at the request of Dr Bernal Ordering Provider for suture removal. No s/s of infection noted. Area cleansed with alcohol swab. 2 sutures removed without complication. Vaseline applied. Discussed keeping area clean and reapplying vaseline for at least 24 hrs or until healed.    This service provided today was under the supervising provider of the day Dr Bell, who was available if needed.    Jahaira Ko RN

## 2019-10-01 ENCOUNTER — THERAPY VISIT (OUTPATIENT)
Dept: PHYSICAL THERAPY | Facility: CLINIC | Age: 69
End: 2019-10-01
Payer: COMMERCIAL

## 2019-10-01 DIAGNOSIS — M54.16 LUMBAR RADICULOPATHY: ICD-10-CM

## 2019-10-01 DIAGNOSIS — M79.652 PAIN OF LEFT THIGH: ICD-10-CM

## 2019-10-01 PROCEDURE — 97530 THERAPEUTIC ACTIVITIES: CPT | Mod: GP | Performed by: PHYSICAL THERAPIST

## 2019-10-01 PROCEDURE — 97110 THERAPEUTIC EXERCISES: CPT | Mod: GP | Performed by: PHYSICAL THERAPIST

## 2019-10-03 ENCOUNTER — HEALTH MAINTENANCE LETTER (OUTPATIENT)
Age: 69
End: 2019-10-03

## 2019-10-11 ENCOUNTER — THERAPY VISIT (OUTPATIENT)
Dept: PHYSICAL THERAPY | Facility: CLINIC | Age: 69
End: 2019-10-11
Payer: COMMERCIAL

## 2019-10-11 DIAGNOSIS — M79.652 PAIN OF LEFT THIGH: ICD-10-CM

## 2019-10-11 DIAGNOSIS — M54.16 LUMBAR RADICULOPATHY: ICD-10-CM

## 2019-10-11 PROCEDURE — 97110 THERAPEUTIC EXERCISES: CPT | Mod: GP | Performed by: PHYSICAL THERAPIST

## 2019-10-11 PROCEDURE — 97112 NEUROMUSCULAR REEDUCATION: CPT | Mod: GP | Performed by: PHYSICAL THERAPIST

## 2019-10-11 PROCEDURE — 97530 THERAPEUTIC ACTIVITIES: CPT | Mod: GP | Performed by: PHYSICAL THERAPIST

## 2019-10-31 ENCOUNTER — OFFICE VISIT (OUTPATIENT)
Dept: DERMATOLOGY | Facility: CLINIC | Age: 69
End: 2019-10-31
Payer: COMMERCIAL

## 2019-10-31 DIAGNOSIS — L28.0 LICHEN SIMPLEX CHRONICUS: ICD-10-CM

## 2019-10-31 RX ORDER — TACROLIMUS 1 MG/G
OINTMENT TOPICAL 2 TIMES DAILY
Qty: 60 G | Refills: 5 | Status: SHIPPED | OUTPATIENT
Start: 2019-10-31 | End: 2020-03-29

## 2019-10-31 ASSESSMENT — PAIN SCALES - GENERAL: PAINLEVEL: NO PAIN (0)

## 2019-10-31 NOTE — NURSING NOTE
Chief Complaint   Patient presents with     Rash     Jefry is here today for Rash follow up. Jefry notes some improvement with the Tacrolimus.      Jalyn Ashton LPN

## 2019-10-31 NOTE — PROGRESS NOTES
[facility] Dermatology Note      Dermatology Problem List:  1. 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 milial cyst, shaft of the penis, s/p I&D 1/11/19   3. Poorly demarcated erythematous patch involving gluteal cleft   -s/p skin culture 7/11/19, triamcinolone cream, ketoconazole cream, mupirocin  -s/p punch biopsy 9/12/19, c/w LSC  -current Rx: protopic BID PRN     Encounter Date: Oct 31, 2019    CC:   Chief Complaint   Patient presents with     Rash     Jefry is here today for Rash follow up. Jefry notes some improvement with the Tacrolimus.      History of Present Illness:  Mr. Jefry Hatfield is a 68 year old male who presents as a follow-up for a rash involving the gluteal cleft. The patient was last seen 9/12/19 when we performed a punch biopsy that showed features consistent with lichen simplex chronicus. At that point, we recommended topical tacrolimus ointment 0.1% as needed to the site. Per the patient, he has noticed improvement to the skin, although occasionally he still experiences a burning sensation in the area when he sits but this has gotten better. He requests refills on his protopic today. The patient is well today in their baseline state of health, with no additional skin concerns.     Past Medical History:   Patient Active Problem List   Diagnosis     Vision changes     Senile nuclear sclerosis     Malignant myopia     Pseudophakia of both eyes - Both Eyes     Myopic degeneration     Anisometropia     Cervical radiculopathy     Xerostomia     Adenomatous polyp of colon     Lumbar radiculopathy     Pain of left thigh     Past Medical History:   Diagnosis Date     Anisometropia      Cataract, right eye      Cervical radiculopathy      High myopia      Myopic degeneration      Posterior staphyloma     LE     Pseudophakia of left eye      Retinal detachment RE 1971,[id number]    BE     Past Surgical History:   Procedure Laterality Date     APPENDECTOMY        CATARACT IOL, RT/LT Bilateral 11/14/12LE 7/1/14RE    BE     COLONOSCOPY N/A 5/25/2018    Procedure: COLONOSCOPY;  Screening Colonoscopy;  Surgeon: Zak Kaplan MD;  [facility]     PHACOEMULSIFICATION CLEAR CORNEA WITH STANDARD INTRAOCULAR LENS IMPLANT  7/1/2014    Procedure: PHACOEMULSIFICATION CLEAR CORNEA WITH STANDARD INTRAOCULAR LENS IMPLANT;  Surgeon: Milan Bernardo MD;  [facility]     SCLERAL BUCKLE  1971    RE     SCLERAL BUCKLE  1980    LE     YAG CAPSULOTOMY OD (RIGHT EYE)  10/30/2014       Social History:  Patient reports that he has never smoked. He has never used smokeless tobacco. He reports current alcohol use. He reports that he does not use drugs.    Family History:  Family History   Problem Relation Age of Onset     Dementia Mother      Heart Failure Father      Asthma Father      Diabetes Paternal Grandfather      Cancer No family hx of      Glaucoma No family hx of      Macular Degeneration No family hx of      Skin Cancer No family hx of      Melanoma No family hx of        Medications:  Current Outpatient Medications   Medication Sig Dispense Refill     CALCIUM-MAG-VIT C-VIT D PO Take by mouth 2 times daily       gabapentin (NEURONTIN) 100 MG capsule   1     ketoconazole (NIZORAL) 2 % external cream Apply topically daily 60 g 5     mupirocin (BACTROBAN) 2 % external ointment Use 2 times a day to gluteal cleft. 30 g 1     nortriptyline (PAMELOR) 25 MG capsule 125 mg daily  2     Omega-3 Fatty Acids (OMEGA-3 FISH OIL PO) Take by mouth daily       Saw Palmetto, Serenoa repens, (SAW PALMETTO EXTRACT PO) Take by mouth daily       tacrolimus (PROTOPIC) 0.1 % external ointment Apply topically 2 times daily 60 g 3     triamcinolone (KENALOG) 0.025 % cream Apply topically 2 times daily 80 g 3     VITAMIN D, CHOLECALCIFEROL, PO Take by mouth daily       oxyCODONE IR (ROXICODONE) 5 MG tablet Take 1 tablet (5 mg) by mouth every 6 hours as needed for pain (Patient not taking:  Reported on 10/31/2019) 15 tablet 0        Allergies   Allergen Reactions     Ranitidine Unknown     Review of Systems:  -As per HPI  -Constitutional: Otherwise feeling well today, in usual state of health.  -Skin: As above in HPI. No additional skin concerns.    Physical exam:  Vitals: There were no vitals taken for this visit.  GEN: This is a well developed, well-nourished male in no acute distress, in a pleasant mood.    SKIN: Focused examination of the bilateral upper thighs and buttocks was performed.  -bilateral symmetric faintly erythematous plaques without excoriation or maceration involving bilateral gluteal cleft  -No other lesions of concern on areas examined.     Impression/Plan:  1. LSC, gluteal cleft, s/p punch biopsy 9/12/19    Discussed etiology, pathogenesis, and treatment strategies for this common skin condition.    Recommended topical tacrolimus ointment 0.1% BID PRN as this with working well for the patient.     Recommended return/close follow up if patient is worsening or failing to continually improve.    CC Brooke Gonzalez MD  [address] [place] [address] [address]  [place] [address] on close of this encounter.  Follow-up prn for new or changing lesions.     staffed the patient.    Staff Involved:  Resident(Cris Bernal)/Staff  I, Chiara Lux MD, saw this patient with the resident and agree with the resident s findings and plan of care as documented in the resident s note.

## 2019-10-31 NOTE — LETTER
10/31/2019       RE: Jefry Hatfield  [address]  [place] [address]     Dear Colleague,    Thank you for referring your patient, Jefry Hatfield, to the [facility] DERMATOLOGY at [facility]. Please see a copy of my visit note below.    [facility] Dermatology Note      Dermatology Problem List:  1. 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 milial cyst, shaft of the penis, s/p I&D 1/11/19   3. Poorly demarcated erythematous patch involving gluteal cleft   -s/p skin culture 7/11/19, triamcinolone cream, ketoconazole cream, mupirocin  -s/p punch biopsy 9/12/19, c/w LSC  -current Rx: protopic BID PRN     Encounter Date: Oct 31, 2019    CC:   Chief Complaint   Patient presents with     Rash     Jefry is here today for Rash follow up. Jefry notes some improvement with the Tacrolimus.      History of Present Illness:  Mr. Jefry Hatfield is a 68 year old male who presents as a follow-up for a rash involving the gluteal cleft. The patient was last seen 9/12/19 when we performed a punch biopsy that showed features consistent with lichen simplex chronicus. At that point, we recommended topical tacrolimus ointment 0.1% as needed to the site. Per the patient, he has noticed improvement to the skin, although occasionally he still experiences a burning sensation in the area when he sits but this has gotten better. He requests refills on his protopic today. The patient is well today in their baseline state of health, with no additional skin concerns.     Past Medical History:   Patient Active Problem List   Diagnosis     Vision changes     Senile nuclear sclerosis     Malignant myopia     Pseudophakia of both eyes - Both Eyes     Myopic degeneration     Anisometropia     Cervical radiculopathy     Xerostomia     Adenomatous polyp of colon     Lumbar radiculopathy     Pain of left thigh     Past Medical History:   Diagnosis Date      Anisometropia      Cataract, right eye      Cervical radiculopathy      High myopia      Myopic degeneration      Posterior staphyloma     LE     Pseudophakia of left eye      Retinal detachment RE 1971,[id number]    BE     Past Surgical History:   Procedure Laterality Date     APPENDECTOMY       CATARACT IOL, RT/LT Bilateral 11/14/12LE 7/1/14RE    BE     COLONOSCOPY N/A 5/25/2018    Procedure: COLONOSCOPY;  Screening Colonoscopy;  Surgeon: Zak Kaplan MD;  [facility]     PHACOEMULSIFICATION CLEAR CORNEA WITH STANDARD INTRAOCULAR LENS IMPLANT  7/1/2014    Procedure: PHACOEMULSIFICATION CLEAR CORNEA WITH STANDARD INTRAOCULAR LENS IMPLANT;  Surgeon: Milan Bernardo MD;  Location: [facility]     SCLERAL BUCKLE  1971    RE     SCLERAL BUCKLE  1980    LE     YAG CAPSULOTOMY OD (RIGHT EYE)  10/30/2014       Social History:  Patient reports that he has never smoked. He has never used smokeless tobacco. He reports current alcohol use. He reports that he does not use drugs.    Family History:  Family History   Problem Relation Age of Onset     Dementia Mother      Heart Failure Father      Asthma Father      Diabetes Paternal Grandfather      Cancer No family hx of      Glaucoma No family hx of      Macular Degeneration No family hx of      Skin Cancer No family hx of      Melanoma No family hx of        Medications:  Current Outpatient Medications   Medication Sig Dispense Refill     CALCIUM-MAG-VIT C-VIT D PO Take by mouth 2 times daily       gabapentin (NEURONTIN) 100 MG capsule   1     ketoconazole (NIZORAL) 2 % external cream Apply topically daily 60 g 5     mupirocin (BACTROBAN) 2 % external ointment Use 2 times a day to gluteal cleft. 30 g 1     nortriptyline (PAMELOR) 25 MG capsule 125 mg daily  2     Omega-3 Fatty Acids (OMEGA-3 FISH OIL PO) Take by mouth daily       Saw Palmetto, Serenoa repens, (SAW PALMETTO EXTRACT PO) Take by mouth daily       tacrolimus (PROTOPIC) 0.1 % external ointment Apply  topically 2 times daily 60 g 3     triamcinolone (KENALOG) 0.025 % cream Apply topically 2 times daily 80 g 3     VITAMIN D, CHOLECALCIFEROL, PO Take by mouth daily       oxyCODONE IR (ROXICODONE) 5 MG tablet Take 1 tablet (5 mg) by mouth every 6 hours as needed for pain (Patient not taking: Reported on 10/31/2019) 15 tablet 0        Allergies   Allergen Reactions     Ranitidine Unknown     Review of Systems:  -As per HPI  -Constitutional: Otherwise feeling well today, in usual state of health.  -Skin: As above in HPI. No additional skin concerns.    Physical exam:  Vitals: There were no vitals taken for this visit.  GEN: This is a well developed, well-nourished male in no acute distress, in a pleasant mood.    SKIN: Focused examination of the bilateral upper thighs and buttocks was performed.  -bilateral symmetric faintly erythematous plaques without excoriation or maceration involving bilateral gluteal cleft  -No other lesions of concern on areas examined.     Impression/Plan:  1. LSC, gluteal cleft, s/p punch biopsy 9/12/19    Discussed etiology, pathogenesis, and treatment strategies for this common skin condition.    Recommended topical tacrolimus ointment 0.1% BID PRN as this with working well for the patient.     Recommended return/close follow up if patient is worsening or failing to continually improve.    CC Brooke Gonzalez MD  [address] [place] [address] [address]  [place] [address] on close of this encounter.  Follow-up prn for new or changing lesions.     staffed the patient.    Staff Involved:  Resident(Cris Bernal)/Staff  I, Chiara Lux MD, saw this patient with the resident and agree with the resident s findings and plan of care as documented in the resident s note.      Again, thank you for allowing me to participate in the care of your patient.      Sincerely,    Cris Bernal MD

## 2019-11-25 ENCOUNTER — THERAPY VISIT (OUTPATIENT)
Dept: PHYSICAL THERAPY | Facility: CLINIC | Age: 69
End: 2019-11-25
Payer: COMMERCIAL

## 2019-11-25 DIAGNOSIS — M79.652 PAIN OF LEFT THIGH: ICD-10-CM

## 2019-11-25 DIAGNOSIS — M54.16 LUMBAR RADICULOPATHY: ICD-10-CM

## 2019-11-25 PROCEDURE — 97140 MANUAL THERAPY 1/> REGIONS: CPT | Mod: GP | Performed by: PHYSICAL THERAPIST

## 2019-11-25 PROCEDURE — 97110 THERAPEUTIC EXERCISES: CPT | Mod: GP | Performed by: PHYSICAL THERAPIST

## 2019-11-25 PROCEDURE — 97112 NEUROMUSCULAR REEDUCATION: CPT | Mod: GP | Performed by: PHYSICAL THERAPIST

## 2019-12-18 ENCOUNTER — OFFICE VISIT (OUTPATIENT)
Dept: OPTOMETRY | Facility: CLINIC | Age: 69
End: 2019-12-18
Payer: COMMERCIAL

## 2019-12-18 DIAGNOSIS — H44.23 UNCOMPLICATED DEGENERATIVE MYOPIA OF BOTH EYES: ICD-10-CM

## 2019-12-18 DIAGNOSIS — H52.31 ANISOMETROPIA: Primary | ICD-10-CM

## 2019-12-18 DIAGNOSIS — H54.3 LOW VISION, BOTH EYES: ICD-10-CM

## 2019-12-18 ASSESSMENT — VISUAL ACUITY
OS_CC: 20/200
OS_PH_CC: 20/150
CORRECTION_TYPE: CONTACTS
METHOD: SNELLEN - LINEAR
OD_PH_CC: 20/200
OD_CC: 20/300

## 2019-12-18 ASSESSMENT — REFRACTION_CURRENTRX
OS_BASECURVE: 8.5
OS_DIAMETER: 14.1
OD_DIAMETER: 14.1
OS_BRAND: DAILIES TOTAL 1
OD_SPHERE: -12.00
OD_BASECURVE: 8.5
OS_SPHERE: -1.50
OD_BRAND: DAILIES TOTAL 1

## 2019-12-18 ASSESSMENT — SLIT LAMP EXAM - LIDS
COMMENTS: NORMAL
COMMENTS: NORMAL

## 2019-12-18 ASSESSMENT — EXTERNAL EXAM - LEFT EYE: OS_EXAM: NORMAL

## 2019-12-18 ASSESSMENT — TONOMETRY
OS_IOP_MMHG: 13
OD_IOP_MMHG: 16
IOP_METHOD: ICARE

## 2019-12-18 ASSESSMENT — EXTERNAL EXAM - RIGHT EYE: OD_EXAM: NORMAL

## 2019-12-18 NOTE — PROGRESS NOTES
A/P  1.) Degenerative Myopia/Anisometropia OU  -Doing well in soft CL's, wearing daily disposables mostly.   -Alternates glasses and contacts now that he is retired, glasses at home prn  -No sig CL overrefraction. Rec continue with current power  -Ref Refresh Plus or Refresh Optive over CL's - preservative free    Continue with current Rx CL's. Monitor her annually, seeing Charlene in spring    I have confirmed the patient's CC, HPI and reviewed Past Medical History, Past Surgical History, Social History, Family History, Problem List, Medication List and agree with Tech note.     Gabriela Saenz, OD FAAO FSLS    Contact Lens Billing  V-Code:  - Soft spherical  Final Contact Lens Rx       Brand Base Curve Diameter Sphere    Right Dailies Total 1 8.5 14.1 -12.00    Left Dailies Total 1 8.5 14.1 -1.50    Expiration Date:  12/18/21    Replacement:  Daily    Wearing Schedule:  Daytime wear only         ABB order mailed direct: [id number]  # of units: 2 90-packs  Price per Unit: $95 per 90-pack    This patient requires contact lenses that are medically necessary for either improvement in vision over spectacles, support of the ocular surface, or other therapeutic benefit. These are not cosmetic contact lenses.  Significant anisometropia, degenerative myopia    Encounter Diagnoses   Name Primary?     Anisometropia Yes     Uncomplicated degenerative myopia of both eyes      Low vision, both eyes

## 2020-01-13 NOTE — ADDENDUM NOTE
Encounter addended by: Kate Herrera, OT on: 1/13/2020 4:19 PM   Actions taken: Flowsheet data copied forward, Clinical Note Signed, Flowsheet accepted, Episode resolved

## 2020-01-17 ENCOUNTER — MYC REFILL (OUTPATIENT)
Dept: DERMATOLOGY | Facility: CLINIC | Age: 70
End: 2020-01-17

## 2020-01-17 DIAGNOSIS — L28.0 LICHEN SIMPLEX CHRONICUS: ICD-10-CM

## 2020-01-17 RX ORDER — TACROLIMUS 1 MG/G
OINTMENT TOPICAL 2 TIMES DAILY
Qty: 60 G | Refills: 5 | Status: CANCELLED | OUTPATIENT
Start: 2020-01-17

## 2020-01-27 ENCOUNTER — THERAPY VISIT (OUTPATIENT)
Dept: PHYSICAL THERAPY | Facility: CLINIC | Age: 70
End: 2020-01-27
Payer: COMMERCIAL

## 2020-01-27 DIAGNOSIS — M79.652 PAIN OF LEFT THIGH: ICD-10-CM

## 2020-01-27 DIAGNOSIS — M54.16 LUMBAR RADICULOPATHY: ICD-10-CM

## 2020-01-27 PROCEDURE — 97112 NEUROMUSCULAR REEDUCATION: CPT | Mod: GP | Performed by: PHYSICAL THERAPIST

## 2020-01-27 PROCEDURE — 97530 THERAPEUTIC ACTIVITIES: CPT | Mod: GP | Performed by: PHYSICAL THERAPIST

## 2020-01-27 PROCEDURE — 97110 THERAPEUTIC EXERCISES: CPT | Mod: GP | Performed by: PHYSICAL THERAPIST

## 2020-03-17 PROBLEM — M79.652 PAIN OF LEFT THIGH: Status: RESOLVED | Noted: 2019-08-23 | Resolved: 2020-03-17

## 2020-03-17 PROBLEM — M54.16 LUMBAR RADICULOPATHY: Status: RESOLVED | Noted: 2019-08-23 | Resolved: 2020-03-17

## 2020-03-17 NOTE — PROGRESS NOTES
Discharge Note    Progress reporting period is from initial eval to Jan 27, 2020.     Jefry failed to return for next follow up visit and current status is unknown.  Please see information below for last relevant information on current status.  Patient seen for Rxs Used: 7 visits.    SUBJECTIVE  Subjective changes noted by patient:  Subjective: Jefry returns today to make sure his technique is correct on his home exercise program. .  Current pain level is  .     Previous pain level was   .   Changes in function:  Yes (See Goal flowsheet attached for changes in current functional level)  Adverse reaction to treatment or activity: None    OBJECTIVE  Changes noted in objective findings: Objective: Minimal cueing required for SLR ABD for trunk positioning. Minimal cueing for abdominal engagement throughout entirety of abdominal #9.    ASSESSMENT/PLAN  Diagnosis: Lumbar radiculopathy   DIAGP:  Diagnoses of Lumbar radiculopathy and Pain of left thigh were pertinent to this visit.  Updated problem list and treatment plan:     Pain - HEP  Decreased ROM/flexibility - HEP  Decreased function - HEP  Decreased strength - HEP    STG/LTGs have been met or progress has been made towards goals:  Yes, please see goal flowsheet for most current information.    Assessment of Progress: current status is unknown.  Last current status:  .  Self Management Plans:  HEP    I have re-evaluated this patient and find that the nature, scope, duration and intensity of the therapy is appropriate for the medical condition of the patient.  Jefry continues to require the following intervention to meet STG and LTG's:  HEP.    Recommendations:  Discharge with current home program.  Patient to follow up with MD as needed. Episode to be closed at this time and patient formally discharged from therapy.    Luis Upton, PT, DPT, OCS      Please refer to the daily flowsheet for treatment today, total treatment time and time spent performing 1:1 timed  codes.

## 2020-03-29 ENCOUNTER — MYC REFILL (OUTPATIENT)
Dept: DERMATOLOGY | Facility: CLINIC | Age: 70
End: 2020-03-29

## 2020-03-29 DIAGNOSIS — L28.0 LICHEN SIMPLEX CHRONICUS: ICD-10-CM

## 2020-03-30 RX ORDER — TACROLIMUS 1 MG/G
OINTMENT TOPICAL 2 TIMES DAILY
Qty: 60 G | Refills: 2 | Status: SHIPPED | OUTPATIENT
Start: 2020-03-30 | End: 2022-07-22

## 2020-04-30 ENCOUNTER — MYC REFILL (OUTPATIENT)
Dept: DERMATOLOGY | Facility: CLINIC | Age: 70
End: 2020-04-30

## 2020-04-30 DIAGNOSIS — L28.0 LICHEN SIMPLEX CHRONICUS: ICD-10-CM

## 2020-04-30 RX ORDER — TACROLIMUS 1 MG/G
OINTMENT TOPICAL 2 TIMES DAILY
Qty: 60 G | Refills: 2 | Status: CANCELLED | OUTPATIENT
Start: 2020-04-30

## 2020-06-29 ENCOUNTER — TELEPHONE (OUTPATIENT)
Dept: INTERNAL MEDICINE | Facility: CLINIC | Age: 70
End: 2020-06-29

## 2020-06-29 ENCOUNTER — VIRTUAL VISIT (OUTPATIENT)
Dept: INTERNAL MEDICINE | Facility: CLINIC | Age: 70
End: 2020-06-29
Payer: COMMERCIAL

## 2020-06-29 DIAGNOSIS — U07.1 CLINICAL DIAGNOSIS OF COVID-19: ICD-10-CM

## 2020-06-29 DIAGNOSIS — U07.1 CLINICAL DIAGNOSIS OF COVID-19: Primary | ICD-10-CM

## 2020-06-29 NOTE — NURSING NOTE
Chief Complaint   Patient presents with     Lab Only     pt would like to discuss covid antibody test       Shailesh Roger CMA, EMT at 7:32 AM on 6/29/2020.

## 2020-06-29 NOTE — PROGRESS NOTES
70 yo M with hx lumbar radiculopathy, anxiety, depression, noise sensitivity/ intolerance, vision problems followed by ophthalmology and cervical radiculopathy phone visit today for questions regarding COVID testing in light of upcoming travel.  In March had episode of fatigue and headache for a few days and questioning exposure.  We discussed the Ab testing and he will proceed with this.    A   Possible COVID exposure    P  Will get AB testing  (7minutes)  Parish Lanier MD

## 2020-07-01 LAB
COVID-19 SPIKE RBD ABY TITER: NORMAL
COVID-19 SPIKE RBD ABY: NEGATIVE

## 2020-11-02 ENCOUNTER — MYC REFILL (OUTPATIENT)
Dept: DERMATOLOGY | Facility: CLINIC | Age: 70
End: 2020-11-02

## 2020-11-02 DIAGNOSIS — L28.0 LICHEN SIMPLEX CHRONICUS: ICD-10-CM

## 2020-11-02 RX ORDER — TACROLIMUS 1 MG/G
OINTMENT TOPICAL
Qty: 60 G | Refills: 2 | OUTPATIENT
Start: 2020-11-02

## 2020-11-02 RX ORDER — TACROLIMUS 1 MG/G
OINTMENT TOPICAL 2 TIMES DAILY
Qty: 60 G | Refills: 2 | Status: CANCELLED | OUTPATIENT
Start: 2020-11-02

## 2020-11-03 NOTE — TELEPHONE ENCOUNTER
[facility] Call Center    Phone Message    May a detailed message be left on voicemail: yes     Reason for Call: Other: Pt set up for appt on 12/31. Would like adrienne refill until then.     Action Taken: Message routed to:  [facility] Derm    Travel Screening: Not Applicable

## 2020-11-03 NOTE — TELEPHONE ENCOUNTER
tacrolimus (PROTOPIC) 0.1 % external ointment      Last Written Prescription Date:  3-  Last Fill Quantity: 60g,   # refills: 2  Last Office Visit : 10-  Future Office visit:  12-    Routing refill request to provider for review/approval because:  Pt over 6 month RTC/FU.  Now has appointment scheduled for 12- - would like a adrienne refill.      Kathleen M Doege RN

## 2020-11-04 ENCOUNTER — TELEPHONE (OUTPATIENT)
Dept: DERMATOLOGY | Facility: CLINIC | Age: 70
End: 2020-11-04

## 2020-11-04 DIAGNOSIS — R21 RASH: Primary | ICD-10-CM

## 2020-11-04 RX ORDER — TACROLIMUS 1 MG/G
OINTMENT TOPICAL 2 TIMES DAILY
Qty: 60 G | Refills: 0 | OUTPATIENT
Start: 2020-11-04

## 2020-11-04 RX ORDER — TACROLIMUS 1 MG/G
OINTMENT TOPICAL 2 TIMES DAILY
Qty: 60 G | Refills: 1 | Status: SHIPPED | OUTPATIENT
Start: 2020-11-04 | End: 2020-12-31

## 2020-11-04 RX ORDER — TACROLIMUS 1 MG/G
OINTMENT TOPICAL 2 TIMES DAILY
Qty: 60 G | Refills: 1 | Status: SHIPPED | OUTPATIENT
Start: 2020-11-04 | End: 2020-11-04

## 2020-11-04 NOTE — TELEPHONE ENCOUNTER
[facility] Call Center    Phone Message    May a detailed message be left on voicemail: yes     Reason for Call: Other: .  pt would like a call back to understand if you will continue to process refill since he now has a scheduled appt in December.    Action Taken: Message routed to:  [facility] derm    Travel Screening: Not Applicable

## 2020-11-04 NOTE — TELEPHONE ENCOUNTER
Received refill request for tacrolimus ointment as the resident on call. Reviewed patient's chart and attached communication. Patient last seen 12/19, appointment scheduled for 12/20. After reviewing the medication list and assessment and plan from last visit, the refill request was accepted.    The patient's information will be forwarded to the scheduling pool for return visit as planned at prior visit.    Ayesha Lynch PGY3  Dermatology Resident  pager      CC'ing Dr Lux as FYI only

## 2020-11-07 ENCOUNTER — HEALTH MAINTENANCE LETTER (OUTPATIENT)
Age: 70
End: 2020-11-07

## 2020-12-31 ENCOUNTER — VIRTUAL VISIT (OUTPATIENT)
Dept: DERMATOLOGY | Facility: CLINIC | Age: 70
End: 2020-12-31
Payer: COMMERCIAL

## 2020-12-31 DIAGNOSIS — R21 RASH AND NONSPECIFIC SKIN ERUPTION: ICD-10-CM

## 2020-12-31 DIAGNOSIS — R21 RASH: ICD-10-CM

## 2020-12-31 PROCEDURE — 99213 OFFICE O/P EST LOW 20 MIN: CPT | Mod: 95 | Performed by: STUDENT IN AN ORGANIZED HEALTH CARE EDUCATION/TRAINING PROGRAM

## 2020-12-31 RX ORDER — TRIAMCINOLONE ACETONIDE 0.25 MG/G
CREAM TOPICAL 2 TIMES DAILY
Qty: 80 G | Refills: 3 | Status: SHIPPED | OUTPATIENT
Start: 2020-12-31 | End: 2021-09-01

## 2020-12-31 RX ORDER — TACROLIMUS 1 MG/G
OINTMENT TOPICAL 2 TIMES DAILY
Qty: 60 G | Refills: 1 | Status: SHIPPED | OUTPATIENT
Start: 2020-12-31 | End: 2021-09-01

## 2020-12-31 ASSESSMENT — PAIN SCALES - GENERAL: PAINLEVEL: NO PAIN (0)

## 2020-12-31 NOTE — LETTER
"12/31/2020       RE: Jefry Hatfield  [address] [place] [address]     Dear Colleague,    Thank you for referring your patient, Jefry Hatfield, to the [facility] DERMATOLOGY CLINIC [place] at [facility]. Please see a copy of my visit note below.              [facility] Dermatology Record:  Mychart Connected      Dermatology Problem List:  1. 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 milial cyst, shaft of the penis, s/p I&D 1/11/19   3. 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      History of Present Illness:  Jefry Hatfield is a 70 year old male who presents for presents for rash on buttocks and gluteal cleft. states overall has been relatively stable. Some improvement with use of protopic but does flare occasionally on buttock with more scale. Has not used topical steroid in quite some time, just the protopic. Does spend a lot of time in hard wooden chairs. Likes to row and swims occasionally. No other symptoms and states his rash is \"tolerable\". The patient is well today in their baseline state of health, with no additional skin concerns.     ROS: Patient is generally feeling well today     Physical Examination:  General: Well-appearing, appropriately-developed individual.  Skin:  Exam was performed by photo review and is significant for the following:  -buttocks and gluteal cleft with scaly erythematous papules/plaques on buttocks symmetrically as well as symmetrically along cleft with less scale     Labs:  NA    Past Medical History:   Patient Active Problem List   Diagnosis     Vision changes     Senile nuclear sclerosis "     Malignant myopia     Pseudophakia of both eyes - Both Eyes     Myopic degeneration     Anisometropia     Xerostomia     Adenomatous polyp of colon     Past Medical History:   Diagnosis Date     Anisometropia      Cataract, right eye      Cervical radiculopathy      High myopia      Myopic degeneration      Posterior staphyloma     LE     Pseudophakia of left eye      Retinal detachment RE 1971,[id number]    BE     Past Surgical History:   Procedure Laterality Date     APPENDECTOMY       CATARACT IOL, RT/LT Bilateral 11/14/12LE 7/1/14RE    BE     COLONOSCOPY N/A 5/25/2018    Procedure: COLONOSCOPY;  Screening Colonoscopy;  Surgeon: Zak Kaplan MD;  [facility]     PHACOEMULSIFICATION CLEAR CORNEA WITH STANDARD INTRAOCULAR LENS IMPLANT  7/1/2014    Procedure: PHACOEMULSIFICATION CLEAR CORNEA WITH STANDARD INTRAOCULAR LENS IMPLANT;  Surgeon: Milan Bernardo MD;  [facility]     SCLERAL BUCKLE  1971    RE     SCLERAL BUCKLE  1980    LE     YAG CAPSULOTOMY OD (RIGHT EYE)  10/30/2014       Social History:  Patient reports that he has never smoked. He has never used smokeless tobacco. He reports current alcohol use. He reports that he does not use drugs.    Family History:  Family History   Problem Relation Age of Onset     Dementia Mother      Heart Failure Father      Asthma Father      Diabetes Paternal Grandfather      Cancer No family hx of      Glaucoma No family hx of      Macular Degeneration No family hx of      Skin Cancer No family hx of      Melanoma No family hx of        Medications:  Current Outpatient Medications   Medication     CALCIUM-MAG-VIT C-VIT D PO     gabapentin (NEURONTIN) 100 MG capsule     ketoconazole (NIZORAL) 2 % external cream     mupirocin (BACTROBAN) 2 % external ointment     nortriptyline (PAMELOR) 25 MG capsule     Omega-3 Fatty Acids (OMEGA-3 FISH OIL PO)     oxyCODONE IR (ROXICODONE) 5 MG tablet     Saw Palmetto, Serenoa repens, (SAW PALMETTO EXTRACT PO)      tacrolimus (PROTOPIC) 0.1 % external ointment     tacrolimus (PROTOPIC) 0.1 % external ointment     triamcinolone (KENALOG) 0.025 % cream     VITAMIN D, CHOLECALCIFEROL, PO     No current facility-administered medications for this visit.           Allergies   Allergen Reactions     Ranitidine Unknown           Impression and Recommendations (Patient Counseled on the Following):  1. Poorly demarcated erythematous patch involving gluteal cleft, s/p punch biopsy showing LSC  Apply protopic twice a day.  Apply triamcinolone cream twice daily as needed.  Please try to pressure offload with an assistive device such as a donut pillow.     Follow-up:   Follow-up with dermatology as needed. Earlier for new or changing lesions or rash.      Staff and resident    Cris Bernal  PGY-4 Dermatology Resident  [facility] Department of Dermatology     _____________________________________________________________________________    Teledermatology information:  - Location of teledermatologist:  ([facility] DERMATOLOGY CLINIC [place] )  - Image quality and interpretability: acceptable  - Date of images: 12/31/20  - Service start time:10:10  - Service end time:10:25  - Date of report: 12/31/2020

## 2020-12-31 NOTE — PATIENT INSTRUCTIONS
[facility] Dermatology Visit    Thank you for allowing us to participate in your care. Your findings, instructions and follow-up plan are as follows:         When should I call my doctor?    If you are worsening or not improving, please, contact us or seek urgent care as noted below.     Who should I call with questions (adults)?    [facility] (adult and pediatric): [phone]     [facility] (adult): [phone]    For urgent needs outside of business hours call the [facility] at [phone] and ask for the dermatology resident on call    If this is a medical emergency and you are unable to reach an ER, Call 911      Who should I call with questions (pediatric)?  [facility]- Pediatric Dermatology  Dr. Ani Perrin, Dr. Zuleima Marin, Dr. Ashley Mg, Alayna Miguel, PA  Dr. Catherine Sales, Dr. Selene Silvestre & Dr. Varinder Au  Non Urgent  Nurse Triage Line; [phone]- Kendra and Roz RN Care Coordinators   Stephania (/Complex ) [phone]    If you need a prescription refill, please contact your pharmacy. Refills are approved or denied by our Physicians during normal business hours, Monday through Fridays  Per office policy, refills will not be granted if you have not been seen within the past year (or sooner depending on your child's condition)    Scheduling Information:  Pediatric Appointment Scheduling and Call Center [phone]  Radiology Scheduling- [phone]  Sedation Unit Scheduling- [phone]  [place] Scheduling- General [phone]; Pediatric Dermatology [phone]  Main  Services: [phone]  Persian: [phone]  Chilean: [phone]  Hmong/Korean/Kazakh: [phone]  Preadmission Nursing Department Fax Number: [phone] (Fax all pre-operative paperwork to this number)    For urgent matters arising during evenings,  weekends, or holidays that cannot wait for normal business hours please call [phone] and ask for the Dermatology Resident On-Call to be paged.      Apply protopic twice a day.  Apply triamcinolone cream twice daily as needed.  Please try to pressure offload with an assistive device such as a donut pillow.

## 2020-12-31 NOTE — PROGRESS NOTES
" [facility] Dermatology Record:  Mychart Connected      Dermatology Problem List:  1. 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 milial cyst, shaft of the penis, s/p I&D 1/11/19   3. 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      History of Present Illness:  Jefry Hatfield is a 70 year old male who presents for presents for rash on buttocks and gluteal cleft. states overall has been relatively stable. Some improvement with use of protopic but does flare occasionally on buttock with more scale. Has not used topical steroid in quite some time, just the protopic. Does spend a lot of time in hard wooden chairs. Likes to row and swims occasionally. No other symptoms and states his rash is \"tolerable\". The patient is well today in their baseline state of health, with no additional skin concerns.     ROS: Patient is generally feeling well today     Physical Examination:  General: Well-appearing, appropriately-developed individual.  Skin:  Exam was performed by photo review and is significant for the following:  -buttocks and gluteal cleft with scaly erythematous papules/plaques on buttocks symmetrically as well as symmetrically along cleft with less scale     Labs:  NA    Past Medical History:   Patient Active Problem List   Diagnosis     Vision changes     Senile nuclear sclerosis     Malignant myopia     Pseudophakia of both eyes - Both Eyes     Myopic degeneration     Anisometropia     Xerostomia     Adenomatous polyp of colon     Past Medical History:   Diagnosis Date     Anisometropia      Cataract, right eye      Cervical radiculopathy      High myopia      Myopic degeneration      Posterior staphyloma  "    LE     Pseudophakia of left eye      Retinal detachment RE 1971,[id number]    BE     Past Surgical History:   Procedure Laterality Date     APPENDECTOMY       CATARACT IOL, RT/LT Bilateral 11/14/12LE 7/1/14RE    BE     COLONOSCOPY N/A 5/25/2018    Procedure: COLONOSCOPY;  Screening Colonoscopy;  Surgeon: Zak Kaplan MD;  [facility]     PHACOEMULSIFICATION CLEAR CORNEA WITH STANDARD INTRAOCULAR LENS IMPLANT  7/1/2014    Procedure: PHACOEMULSIFICATION CLEAR CORNEA WITH STANDARD INTRAOCULAR LENS IMPLANT;  Surgeon: Milan Bernardo MD;  [facility]     SCLERAL BUCKLE  1971    RE     SCLERAL BUCKLE  1980    LE     YAG CAPSULOTOMY OD (RIGHT EYE)  10/30/2014       Social History:  Patient reports that he has never smoked. He has never used smokeless tobacco. He reports current alcohol use. He reports that he does not use drugs.    Family History:  Family History   Problem Relation Age of Onset     Dementia Mother      Heart Failure Father      Asthma Father      Diabetes Paternal Grandfather      Cancer No family hx of      Glaucoma No family hx of      Macular Degeneration No family hx of      Skin Cancer No family hx of      Melanoma No family hx of        Medications:  Current Outpatient Medications   Medication     CALCIUM-MAG-VIT C-VIT D PO     gabapentin (NEURONTIN) 100 MG capsule     ketoconazole (NIZORAL) 2 % external cream     mupirocin (BACTROBAN) 2 % external ointment     nortriptyline (PAMELOR) 25 MG capsule     Omega-3 Fatty Acids (OMEGA-3 FISH OIL PO)     oxyCODONE IR (ROXICODONE) 5 MG tablet     Saw Palmetto, Serenoa repens, (SAW PALMETTO EXTRACT PO)     tacrolimus (PROTOPIC) 0.1 % external ointment     tacrolimus (PROTOPIC) 0.1 % external ointment     triamcinolone (KENALOG) 0.025 % cream     VITAMIN D, CHOLECALCIFEROL, PO     No current facility-administered medications for this visit.           Allergies   Allergen Reactions     Ranitidine Unknown           Impression and  Recommendations (Patient Counseled on the Following):  1. Poorly demarcated erythematous patch involving gluteal cleft, s/p punch biopsy showing LSC  Apply protopic twice a day.  Apply triamcinolone cream twice daily as needed.  Please try to pressure offload with an assistive device such as a donut pillow.     Follow-up:   Follow-up with dermatology as needed. Earlier for new or changing lesions or rash.      Staff and resident    Cris Bernal  PGY-4 Dermatology Resident  [facility] Department of Dermatology     _____________________________________________________________________________    Teledermatology information:  - Location of teledermatologist:  ([facility] DERMATOLOGY CLINIC [place] )  - Image quality and interpretability: acceptable  - Date of images: 12/31/20  - Service start time:10:10  - Service end time:10:25  - Date of report: 12/31/2020

## 2020-12-31 NOTE — NURSING NOTE
Chief Complaint   Patient presents with     Derm Problem     Jefry, is here for a folow up appt.      Usman Castaneda EMT

## 2021-01-05 ENCOUNTER — MYC MEDICAL ADVICE (OUTPATIENT)
Dept: INTERNAL MEDICINE | Facility: CLINIC | Age: 71
End: 2021-01-05

## 2021-01-25 ENCOUNTER — MYC MEDICAL ADVICE (OUTPATIENT)
Dept: INTERNAL MEDICINE | Facility: CLINIC | Age: 71
End: 2021-01-25

## 2021-06-23 ENCOUNTER — OFFICE VISIT (OUTPATIENT)
Dept: OPTOMETRY | Facility: CLINIC | Age: 71
End: 2021-06-23
Payer: COMMERCIAL

## 2021-06-23 ENCOUNTER — TELEPHONE (OUTPATIENT)
Dept: OPTOMETRY | Facility: CLINIC | Age: 71
End: 2021-06-23

## 2021-06-23 DIAGNOSIS — H52.31 ANISOMETROPIA AND ANISEIKONIA: Primary | ICD-10-CM

## 2021-06-23 DIAGNOSIS — H44.23 DEGENERATIVE MYOPIA OF BOTH EYES, UNSPECIFIED WHETHER COMPLICATION PRESENT: ICD-10-CM

## 2021-06-23 DIAGNOSIS — H52.32 ANISOMETROPIA AND ANISEIKONIA: Primary | ICD-10-CM

## 2021-06-24 ASSESSMENT — REFRACTION_CURRENTRX
OS_SPHERE: -1.50
OS_DIAMETER: 14.1
OS_BASECURVE: 8.5
OD_BRAND: DAILIES TOTAL 1
OD_DIAMETER: 14.1
OD_SPHERE: -12.00
OS_BRAND: DAILIES TOTAL 1
OD_BASECURVE: 8.5

## 2021-06-24 NOTE — PROGRESS NOTES
No office visit. CL order only.    Contact Lens Billing  V-Code:  - Soft spherical  Final Contact Lens Rx       Brand Base Curve Diameter Sphere    Right Dailies Total 1 8.5 14.1 -12.00    Left Dailies Total 1 8.5 14.1 -1.50    Expiration Date: 12/18/21         [facility] order mailed direct: [id number]      # of units: 2 30-packs @ $40 per 30-pack, + $7.95 shipping    This patient requires contact lenses that are medically necessary for either improvement in vision over spectacles, support of the ocular surface, or other therapeutic benefit. These are not cosmetic contact lenses.  Significant anisometropia, degenerative myopia    Encounter Diagnoses   Name Primary?     Anisometropia and aniseikonia Yes     Degenerative myopia of both eyes, unspecified whether complication present       Date of last eye exam: 12/18/19

## 2021-08-16 DIAGNOSIS — H44.23 MALIGNANT MYOPIA OF BOTH EYES: Primary | ICD-10-CM

## 2021-08-20 ENCOUNTER — OFFICE VISIT (OUTPATIENT)
Dept: OPTOMETRY | Facility: CLINIC | Age: 71
End: 2021-08-20

## 2021-08-20 DIAGNOSIS — H52.32 ANISOMETROPIA AND ANISEIKONIA: Primary | ICD-10-CM

## 2021-08-20 DIAGNOSIS — H54.3 LOW VISION, BOTH EYES: ICD-10-CM

## 2021-08-20 DIAGNOSIS — H52.31 ANISOMETROPIA: ICD-10-CM

## 2021-08-20 DIAGNOSIS — H52.31 ANISOMETROPIA AND ANISEIKONIA: Primary | ICD-10-CM

## 2021-08-20 DIAGNOSIS — H44.23 DEGENERATIVE MYOPIA OF BOTH EYES, UNSPECIFIED WHETHER COMPLICATION PRESENT: ICD-10-CM

## 2021-08-20 ASSESSMENT — TONOMETRY
OS_IOP_MMHG: 14
IOP_METHOD: ICARE
OD_IOP_MMHG: 15

## 2021-08-20 ASSESSMENT — REFRACTION_WEARINGRX
OS_SPHERE: -2.50
OD_SPHERE: -12.00
OS_CYLINDER: +1.00
OD_CYLINDER: SPHERE
OS_AXIS: 070

## 2021-08-20 ASSESSMENT — REFRACTION_CURRENTRX
OS_BASECURVE: 8.5
OD_BASECURVE: 8.5
OD_BRAND: DAILIES TOTAL 1
OD_SPHERE: -12.00
OD_DIAMETER: 14.1
OS_SPHERE: -1.50
OS_DIAMETER: 14.1
OS_BRAND: DAILIES TOTAL 1

## 2021-08-20 ASSESSMENT — EXTERNAL EXAM - RIGHT EYE: OD_EXAM: ET

## 2021-08-20 ASSESSMENT — VISUAL ACUITY
CORRECTION_TYPE: CONTACTS
OS_CC: 20/125
METHOD: SNELLEN - LINEAR
OD_CC: 20/200

## 2021-08-20 ASSESSMENT — SLIT LAMP EXAM - LIDS
COMMENTS: NORMAL
COMMENTS: NORMAL

## 2021-08-20 ASSESSMENT — EXTERNAL EXAM - LEFT EYE: OS_EXAM: NORMAL

## 2021-08-20 NOTE — PROGRESS NOTES
A/P  1.) Degenerative Myopia/Anisometropia each eye s/p CE/IOL  -Overall doing well in soft CL's, using as needed. Often goes uncorrected at home due to low refractive error left eye  -Minimally wearing glasses these days, defers new Rx  -Can decrease minus right eye for improved overall vision. Trial -11.00 and see if he likes it  -Topos largely regular. Would not anticipate significant advantage from RGP's    Continue daily disposable soft lens prn. Seeing Dr. Chang next week for dilated eye exam

## 2021-09-01 ENCOUNTER — OFFICE VISIT (OUTPATIENT)
Dept: OPHTHALMOLOGY | Facility: CLINIC | Age: 71
End: 2021-09-01
Attending: OPHTHALMOLOGY
Payer: COMMERCIAL

## 2021-09-01 DIAGNOSIS — H44.23 MALIGNANT MYOPIA OF BOTH EYES: ICD-10-CM

## 2021-09-01 PROCEDURE — 99213 OFFICE O/P EST LOW 20 MIN: CPT | Performed by: OPHTHALMOLOGY

## 2021-09-01 PROCEDURE — 92134 CPTRZ OPH DX IMG PST SGM RTA: CPT | Performed by: OPHTHALMOLOGY

## 2021-09-01 PROCEDURE — G0463 HOSPITAL OUTPT CLINIC VISIT: HCPCS

## 2021-09-01 ASSESSMENT — CONF VISUAL FIELD
OS_SUPERIOR_TEMPORAL_RESTRICTION: 3
OD_SUPERIOR_NASAL_RESTRICTION: 3
OS_INFERIOR_NASAL_RESTRICTION: 3
OS_INFERIOR_TEMPORAL_RESTRICTION: 3
OD_SUPERIOR_TEMPORAL_RESTRICTION: 3
OS_SUPERIOR_NASAL_RESTRICTION: 3
OD_INFERIOR_TEMPORAL_RESTRICTION: 3
OD_INFERIOR_NASAL_RESTRICTION: 3

## 2021-09-01 ASSESSMENT — REFRACTION_WEARINGRX
OS_CYLINDER: +1.00
OS_AXIS: 070
OD_SPHERE: -12.00
OS_SPHERE: -2.50
OD_CYLINDER: SPHERE

## 2021-09-01 ASSESSMENT — VISUAL ACUITY
OS_CC: 20/100
CORRECTION_TYPE: CONTACTS
OS_CC+: -1
OD_PH_CC: 20/125
OS_PH_CC: 20/100
OD_PH_CC+: -1
METHOD: ALLEN CARDS
OD_CC: 20/200

## 2021-09-01 ASSESSMENT — SLIT LAMP EXAM - LIDS
COMMENTS: NORMAL
COMMENTS: NORMAL

## 2021-09-01 ASSESSMENT — TONOMETRY
OS_IOP_MMHG: 14
IOP_METHOD: TONOPEN
OD_IOP_MMHG: 14

## 2021-09-01 ASSESSMENT — EXTERNAL EXAM - RIGHT EYE: OD_EXAM: ET

## 2021-09-01 ASSESSMENT — EXTERNAL EXAM - LEFT EYE: OS_EXAM: NORMAL

## 2021-09-01 NOTE — PROGRESS NOTES
CC: follow up myopic degeneration, posterior staphylomas    HPI: Jefry Hatfield is a  70 year old year-old patient with history of myopic degeneration both eyes., He has been followed in the past by Dr. Gopi Anne.    Interval: Reports stable vision; no flashes and floaters   progressive decreased vision both eyes, no new flashes and floaters      OCULAR HISTORY  Retinal detachment  With scleral buckle both eyes  1971, [id number]  Cataract extraction/IOL right eye 7-1-14  Cataract extraction/IOL left eye 11-14-12  S/p yag pc right eye 10-30-14    Retinal Imaging:  OCT  9-1-21  RE: posterior staphyloma, retina atrophic changes. Thin choroid  LE: posterior staphyloma, retina atrophic changes. 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   -retina attached both eyes  - Eye exam stable  -Optical Coherence Tomography stable  - Retina detachment precautions were discussed with the patient (presence or increased in flashes, floaters or a curtain in the visual field) and was asked to return if any of the those occur    # pseudophakia both eyes - observe    Plan:  - observe  -Discussed with patient the use of low vision aids including orcam camera  - Follow up in 12 months, sooner as needed      ~~~~~~~~~~~~~~~~~~~~~~~~~~~~~~~~~~   Complete documentation of historical and exam elements from today's encounter can be found in the full encounter summary report (not reduplicated in this progress note).  I personally obtained the chief complaint(s) and history of present illness.  I confirmed and edited as necessary the review of systems, past medical/surgical history, family history, social history, and examination findings as documented by others; and I examined the patient myself.  I personally reviewed the relevant tests, images, and reports as documented above.  I formulated and edited as necessary the assessment and plan and  discussed the findings and management plan with the patient and family    Lisa Chang MD  .  Retina Service   Department of Ophthalmology and Visual Neurosciences   [facility]  Phone: [phone]   Fax: [phone]

## 2021-09-01 NOTE — NURSING NOTE
Chief Complaints and History of Present Illnesses   Patient presents with     Degenerative Myopia Follow Up     Chief Complaint(s) and History of Present Illness(es)     Degenerative Myopia Follow Up     Laterality: both eyes    Onset: gradual    Severity: severe    Frequency: constantly    Timing: throughout the day    Course: stable    Associated symptoms: floaters (no new).  Negative for eye pain, flashes, itching and dryness    Pain scale: 0/10              Comments     Jefry is here to continue care for Malignant myopia of both eyes. He is wearing contacts today, and he likes them.     Loc Murphy COT 11:02 AM September 1, 2021

## 2021-09-05 ENCOUNTER — HEALTH MAINTENANCE LETTER (OUTPATIENT)
Age: 71
End: 2021-09-05

## 2021-10-27 ENCOUNTER — OFFICE VISIT (OUTPATIENT)
Dept: OPHTHALMOLOGY | Facility: CLINIC | Age: 71
End: 2021-10-27
Payer: COMMERCIAL

## 2021-10-27 DIAGNOSIS — H52.13 MYOPIA OF BOTH EYES: Primary | ICD-10-CM

## 2021-10-27 PROCEDURE — V2520 CONTACT LENS HYDROPHILIC: HCPCS | Performed by: OPTOMETRIST

## 2021-10-27 ASSESSMENT — REFRACTION_CURRENTRX
OD_SPHERE: -11.00
OS_DIAMETER: 14.1
OS_SPHERE: -1.50
OS_BRAND: DAILIES TOTAL 1
OD_BASECURVE: 8.5
OD_DIAMETER: 14.1
OS_BASECURVE: 8.5
OD_BRAND: DAILIES TOTAL 1

## 2021-10-27 NOTE — PROGRESS NOTES
Contact Lens Billing  V-Code:  - Soft spherical     Final Contact Lens Rx     Brand Base Curve Diameter Sphere   Right Dailies Total 1 8.5 14.1 -11.00   Left Dailies Total 1 8.5 14.1 -1.50   Edited by: Gabriela Saenz, JAMEL       # of units: 1 (30 pack)  Price per Unit: $40.00   Shipping: $7.95  Total: 47.95    These are for cosmetic contact lenses.    Encounter Diagnosis   Name Primary?     Myopia of both eyes - Both Eyes Yes        Date of last eye exam: 08/20/2021    Order number: [id number]    Lanette Mead COT October 27, 2021 1:34 PM    Pt only wanted CTL for RE only. Informed pt this was ordered and will be shipped to his home.

## 2021-10-27 NOTE — Clinical Note
I hope I did this right, Let me know if I didn't do anything correctly.     Lanette CHURCH October 27, 2021 1:36 PM

## 2021-10-27 NOTE — PROGRESS NOTES
Contact Lens Billing  V-Code:  - Soft spherical     Final Contact Lens Rx       Brand Base Curve Diameter Sphere   Right Dailies Total 1 8.5 14.1 -11.00   Left Dailies Total 1 8.5 14.1 -1.50   Edited by: Gabriela Saenz, JAMEL         # of units: 1 (30 pack)  Price per Unit: $40.00   Shipping: $7.95  Total: 47.95     These are for cosmetic contact lenses.          Encounter Diagnosis   Name Primary?     Myopia of both eyes - Both Eyes Yes         Date of last eye exam: 08/20/2021     Order number: [id number]     Lanette Mead COT October 27, 2021 1:34 PM     Pt only wanted CTL for RE only. Informed pt this was ordered and will be shipped to his home.

## 2021-12-26 ENCOUNTER — HEALTH MAINTENANCE LETTER (OUTPATIENT)
Age: 71
End: 2021-12-26

## 2022-05-09 ENCOUNTER — OFFICE VISIT (OUTPATIENT)
Dept: OPTOMETRY | Facility: CLINIC | Age: 72
End: 2022-05-09
Payer: COMMERCIAL

## 2022-05-09 DIAGNOSIS — H44.23 DEGENERATIVE MYOPIA OF BOTH EYES, UNSPECIFIED WHETHER COMPLICATION PRESENT: ICD-10-CM

## 2022-05-09 DIAGNOSIS — H52.31 ANISOMETROPIA AND ANISEIKONIA: Primary | ICD-10-CM

## 2022-05-09 DIAGNOSIS — H54.3 LOW VISION, BOTH EYES: ICD-10-CM

## 2022-05-09 DIAGNOSIS — H52.32 ANISOMETROPIA AND ANISEIKONIA: Primary | ICD-10-CM

## 2022-05-09 ASSESSMENT — REFRACTION_CURRENTRX
OD_DIAMETER: 14.1
OD_BRAND: DAILIES TOTAL 1
OD_BASECURVE: 8.5
OS_DIAMETER: 14.1
OS_SPHERE: -1.50
OD_SPHERE: -11.00
OS_BRAND: DAILIES TOTAL 1
OS_BASECURVE: 8.5

## 2022-05-09 NOTE — PROGRESS NOTES
No office visit. CL order only.    Pt would like 1 30-pack of -12.00's and 1 30-pack of -11.00's ordered. Does not need left lens    Contact Lens Billing  V-Code:  - Soft spherical  Final Contact Lens Rx       Brand Base Curve Diameter Sphere Lens    Right Dailies Total 1 8.5 14.1 -11.00/-12.00 ok to order either power as desired    Left Dailies Total 1 8.5 14.1 -1.50          [facility] order mailed direct: [id number]  # of units: 2 30-packs  Price per Unit: $45 per 30-pack    This patient requires contact lenses that are medically necessary for either improvement in vision over spectacles, support of the ocular surface, or other therapeutic benefit. These are not cosmetic contact lenses.     Encounter Diagnoses   Name Primary?     Anisometropia and aniseikonia Yes     Degenerative myopia of both eyes, unspecified whether complication present      Low vision, both eyes         Date of last eye exam: 8/20/21

## 2022-06-28 ENCOUNTER — OFFICE VISIT (OUTPATIENT)
Dept: ORTHOPEDICS | Facility: CLINIC | Age: 72
End: 2022-06-28
Payer: COMMERCIAL

## 2022-06-28 VITALS — BODY MASS INDEX: 22.51 KG/M2 | WEIGHT: 166 LBS

## 2022-06-28 DIAGNOSIS — S29.011A INTERCOSTAL MUSCLE STRAIN, INITIAL ENCOUNTER: Primary | ICD-10-CM

## 2022-06-28 PROCEDURE — 99202 OFFICE O/P NEW SF 15 MIN: CPT | Performed by: FAMILY MEDICINE

## 2022-06-28 NOTE — LETTER
6/28/2022      RE: Jefry Hatfield  [address]  [place] [address]     Dear Colleague,    Thank you for referring your patient, Jefry Hatfield, to the [facility] [place]. Please see a copy of my visit note below.    CHIEF COMPLAINT:  Pain of the Chest       HISTORY OF PRESENT ILLNESS  Mr. Hatfield is a pleasant 71 year old year old male who presents to clinic today with rib cage/sternum pain..  Jefry explains that this morning he was doing Brooks [employer] and a series of stretching and felt a pain after this. He initially felt discomfort in the abdomen, he later went downstairs to  a laundry basket and felt pain with this. 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 He regularly exercises including swimming, running, core exercises. His current running mileage is at 16 miles, but looking to increase his mileage to 18 miles in the next week. He also mentions that he got a new pillow that he started using just last night.     Onset: sudden  Location: abdomen   Quality:  aching  Duration: This morning  Severity: 2/10 at worst  Timing:intermittent episodes   Modifying factors:  resting and non-use makes it better, movement and use makes it worse  Associated signs & symptoms: pain  Previous similar pain: No  Treatments to date:Nothing, just happened this morning    Additional history: as documented    Review of Systems:    Have you recently had a a fever, chills, weight loss? No    Do you have any vision problems? No    Do you have any chest pain or edema? No    Do you have any shortness of breath or wheezing?  No    Do you have stomach problems? No    Do you have any numbness or focal weakness? No    Do you have diabetes? No    Do you have problems with bleeding or clotting? No    Do you have an rashes or other skin lesions? No    MEDICAL HISTORY  Patient Active Problem List   Diagnosis     Vision changes     Senile  nuclear sclerosis     Malignant myopia     Pseudophakia of both eyes - Both Eyes     Myopic degeneration     Anisometropia     Xerostomia     Adenomatous polyp of colon       Current Outpatient Medications   Medication Sig Dispense Refill     CALCIUM-MAG-VIT C-VIT D PO Take by mouth 2 times daily       gabapentin (NEURONTIN) 100 MG capsule   1     nortriptyline (PAMELOR) 25 MG capsule 125 mg daily  2     Omega-3 Fatty Acids (OMEGA-3 FISH OIL PO) Take by mouth daily       Saw Palmetto, Serenoa repens, (SAW PALMETTO EXTRACT PO) Take by mouth daily       tacrolimus (PROTOPIC) 0.1 % external ointment Apply topically 2 times daily 60 g 2     VITAMIN D, CHOLECALCIFEROL, PO Take by mouth daily         Allergies   Allergen Reactions     Ranitidine Unknown       Family History   Problem Relation Age of Onset     Dementia Mother      Heart Failure Father      Asthma Father      Diabetes Paternal Grandfather      Cancer No family hx of      Glaucoma No family hx of      Macular Degeneration No family hx of      Skin Cancer No family hx of      Melanoma No family hx of        Additional medical/Social/Surgical histories reviewed in [facility] and updated as appropriate.       PHYSICAL EXAM  Wt 75.3 kg (166 lb)   BMI 22.51 kg/m      General  - normal appearance, in no obvious distress  Musculoskeletal - rib cage   - stance: normal gait without limp  - inspection: normal bone and joint alignment, no obvious scoliosis  - palpation: Mild tenderness to palpation at intercostal region at lower rib margin.  - ROM: normal and painless flexion, extension, left and right sidebending and rotation of thoracic spine.   Neuro  - no sensory or motor deficit, grossly normal coordination, normal muscle tone  Skin  - no erythema, warmth or ecchymosis, or induration, no obvious rash  Psych  - interactive, appropriate, normal mood and affect    IMAGING : Deferred today     ASSESSMENT & PLAN  Mr. Hatfield is a 71 year old year old male who presents to  clinic today with anterior chest wall pain, which began insidiously after practicing Brooks Chi this morning.    DDx intercostal strain, costochondritis.    Diagnosis:   Intercostal rib strain    -Start stretches for thoracic cage  -Recommend icing in the next 72 hours  -Discussed exercise regimen and give rest over the next 48 hours, and if pain free followed by 50% effort in swimming running if pain free may return quickly to 100%  -Follow up 10-14 days if persisting    It was a pleasure seeing Jefry today.    Varinder Corrigan DO, CAQSM  Primary Care Sports Medicine

## 2022-06-28 NOTE — PATIENT INSTRUCTIONS
Exercises: Intercostal Muscle Strain    Scalene stretch: Sit or stand and clasp both hands behind your back. Lower your left shoulder and tilt your head toward the right until you feel a stretch. Hold this position for 15 to 30 seconds and then come back to the starting position. Then lower your right shoulder and tilt your head toward the left. Hold for 15 to 30 seconds. Repeat 3 times on each side.    Pectoralis stretch:  an open doorway or corner with both hands slightly above your head on the door frame or wall. Slowly lean forward until you feel a stretch in the front of your shoulders. Hold 15 to 30 seconds. Repeat 3 times.    Scapular squeeze: While sitting or standing with your arms by your sides, squeeze your shoulder blades together and hold for 5 seconds. Do 2 sets of 15.    Arm slide on wall: Sit or stand with your back against a wall and your elbows and wrists against the wall. Slowly slide your arms upward as high as you can while keeping your elbows and wrists against the wall. Do 2 sets of 8 to 12.    Thoracic extension: Sit in a chair and clasp both arms behind your head. Gently arch backward and look up toward the ceiling. Repeat 10 times. Do this several times each day.    Rowing exercise: Close middle of elastic tubing in a door or wrap tubing around an immovable object. Hold 1 end in each hand. Sit in a chair, bend your arms 90 degrees, and hold one end of the tubing in each hand. Keep your forearms vertical and your elbows at shoulder level and bent 90 degrees. Pull backward on the band and squeeze your shoulder blades together. Do 2 sets of 15.    Mid-trap exercise: Lie on your stomach on a firm surface and place a folded pillow underneath your chest. Place your arms out straight to your sides with your elbows straight and thumbs toward the ceiling. Slowly raise your arms toward the ceiling as you squeeze your shoulder blades together. Lower slowly. Do 3 sets of 15. As the  exercise gets easier to do, hold soup cans or small weights in your hands.    Developed by [employer].  Published by [employer].  Copyright  2014 [employer] and/or one of its subsidiaries. All rights reserved.

## 2022-06-28 NOTE — PROGRESS NOTES
CHIEF COMPLAINT:  Pain of the Chest       HISTORY OF PRESENT ILLNESS  Mr. Hatfield is a pleasant 71 year old year old male who presents to clinic today with rib cage/sternum pain..  Jefry explains that this morning he was doing Brooks Chi and a series of stretching and felt a pain after this. He initially felt discomfort in the abdomen, he later went downstairs to  a laundry basket and felt pain with this. 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 He regularly exercises including swimming, running, core exercises. His current running mileage is at 16 miles, but looking to increase his mileage to 18 miles in the next week. He also mentions that he got a new pillow that he started using just last night.     Onset: sudden  Location: abdomen   Quality:  aching  Duration: This morning  Severity: 2/10 at worst  Timing:intermittent episodes   Modifying factors:  resting and non-use makes it better, movement and use makes it worse  Associated signs & symptoms: pain  Previous similar pain: No  Treatments to date:Nothing, just happened this morning    Additional history: as documented    Review of Systems:    Have you recently had a a fever, chills, weight loss? No    Do you have any vision problems? No    Do you have any chest pain or edema? No    Do you have any shortness of breath or wheezing?  No    Do you have stomach problems? No    Do you have any numbness or focal weakness? No    Do you have diabetes? No    Do you have problems with bleeding or clotting? No    Do you have an rashes or other skin lesions? No    MEDICAL HISTORY  Patient Active Problem List   Diagnosis     Vision changes     Senile nuclear sclerosis     Malignant myopia     Pseudophakia of both eyes - Both Eyes     Myopic degeneration     Anisometropia     Xerostomia     Adenomatous polyp of colon       Current Outpatient Medications   Medication Sig Dispense Refill     CALCIUM-MAG-VIT C-VIT D PO Take by  mouth 2 times daily       gabapentin (NEURONTIN) 100 MG capsule   1     nortriptyline (PAMELOR) 25 MG capsule 125 mg daily  2     Omega-3 Fatty Acids (OMEGA-3 FISH OIL PO) Take by mouth daily       Saw Palmetto, Serenoa repens, (SAW PALMETTO EXTRACT PO) Take by mouth daily       tacrolimus (PROTOPIC) 0.1 % external ointment Apply topically 2 times daily 60 g 2     VITAMIN D, CHOLECALCIFEROL, PO Take by mouth daily         Allergies   Allergen Reactions     Ranitidine Unknown       Family History   Problem Relation Age of Onset     Dementia Mother      Heart Failure Father      Asthma Father      Diabetes Paternal Grandfather      Cancer No family hx of      Glaucoma No family hx of      Macular Degeneration No family hx of      Skin Cancer No family hx of      Melanoma No family hx of        Additional medical/Social/Surgical histories reviewed in [facility] and updated as appropriate.       PHYSICAL EXAM  Wt 75.3 kg (166 lb)   BMI 22.51 kg/m      General  - normal appearance, in no obvious distress  Musculoskeletal - rib cage   - stance: normal gait without limp  - inspection: normal bone and joint alignment, no obvious scoliosis  - palpation: Mild tenderness to palpation at intercostal region at lower rib margin.  - ROM: normal and painless flexion, extension, left and right sidebending and rotation of thoracic spine.   Neuro  - no sensory or motor deficit, grossly normal coordination, normal muscle tone  Skin  - no erythema, warmth or ecchymosis, or induration, no obvious rash  Psych  - interactive, appropriate, normal mood and affect    IMAGING : Deferred today     ASSESSMENT & PLAN  Mr. Hatfield is a 71 year old year old male who presents to clinic today with anterior chest wall pain, which began insidiously after practicing Brooks Chi this morning.    DDx intercostal strain, costochondritis.    Diagnosis:   Intercostal rib strain    -Start stretches for thoracic cage  -Recommend icing in the next 72 hours  -Discussed  exercise regimen and give rest over the next 48 hours, and if pain free followed by 50% effort in swimming running if pain free may return quickly to 100%  -Follow up 10-14 days if persisting    It was a pleasure seeing Jefry today.    Varinder Corrigan DO, CAQSM  Primary Care Sports Medicine

## 2022-07-19 ENCOUNTER — MYC MEDICAL ADVICE (OUTPATIENT)
Dept: DERMATOLOGY | Facility: CLINIC | Age: 72
End: 2022-07-19

## 2022-07-22 ENCOUNTER — OFFICE VISIT (OUTPATIENT)
Dept: DERMATOLOGY | Facility: CLINIC | Age: 72
End: 2022-07-22
Payer: COMMERCIAL

## 2022-07-22 DIAGNOSIS — L28.0 LICHEN SIMPLEX CHRONICUS: Primary | ICD-10-CM

## 2022-07-22 PROCEDURE — 99213 OFFICE O/P EST LOW 20 MIN: CPT | Performed by: DERMATOLOGY

## 2022-07-22 RX ORDER — TACROLIMUS 1 MG/G
OINTMENT TOPICAL 2 TIMES DAILY
Qty: 60 G | Refills: 2 | Status: SHIPPED | OUTPATIENT
Start: 2022-07-22 | End: 2022-10-21

## 2022-07-22 RX ORDER — TRIAMCINOLONE ACETONIDE 1 MG/G
OINTMENT TOPICAL 2 TIMES DAILY
Qty: 80 G | Refills: 3 | Status: SHIPPED | OUTPATIENT
Start: 2022-07-22 | End: 2022-10-21

## 2022-07-22 ASSESSMENT — PAIN SCALES - GENERAL: PAINLEVEL: NO PAIN (0)

## 2022-07-22 NOTE — NURSING NOTE
Chief Complaint   Patient presents with     Derm Problem     Pt states about a month ago or longer, he started noticing something that feels like a ridge on his gluteal cleft that he is concerned about, but it is not interfering with activities of daily living.     Naveed Moreno, EMT     Principal Discharge DX:	Nausea & vomiting  Secondary Diagnosis:	Abdominal pain

## 2022-07-22 NOTE — LETTER
7/22/2022       RE: Jefry Hatfield  [address]  [place] [address]     Dear Colleague,    Thank you for referring your patient, Jefry Hatfield, to the [facility] [place] at [facility]. Please see a copy of my visit note below.    [facility] Dermatology Note    Encounter Date: Jul 22, 2022    Dermatology Problem List:  1. 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 milial cyst, shaft of the penis, s/p I&D 1/11/19   3. Poorly demarcated erythematous patch involving gluteal cleft   -s/p skin culture 7/11/19, triamcinolone cream, ketoconazole cream, mupirocin  -s/p punch biopsy 9/12/19, c/w lichen simplex chronicus  -current Rx: tacrolimus BID PRN        ______________________________________    Impression/Plan:  1. Irritant dermatitis with lichenification: on buttocks, worsening since prior. Suspect due irritation/friction from long runs (16 mi/week). We discussed restarting tacrolimus, which has previously been effective, as well as using body glide as lubricant while running, which may also be effective. Not currently consistent with infection at this time.  - restart tacrolimus ointment  - OTC body glide with runs      Follow-up in 3 months.       Staff Involved:  Staff Only    Varinder Bell MD   of Dermatology  Department of Dermatology  [facility]      CC:   Chief Complaint   Patient presents with     Derm Problem     Pt states about a month ago or longer, he started noticing something that feels like a ridge on his gluteal cleft that he is concerned about, but it is not interfering with activities of daily living.       History of Present Illness:  Mr. Jefry Hatfield is a 71 year old male who presents as a return patient.    Rash on buttocks - previously resolved after cessation of rowing machine use and tacrolimus  ointment  - now has new rash - feels more vascular  - not responsive to topicals at home  - some mild tenderness    Labs:  N/A    Physical exam:  Vitals: There were no vitals taken for this visit.  GEN: This is a well developed, well-nourished male in no acute distress, in a pleasant mood.    SKIN: Artis phototype II  - Focused examination of the buttocks was performed.  - erythematous scaly patches with lichenification, including fissured lichenified papule, on the buttocks  - No other lesions of concern on areas examined.     Past Medical History:   Past Medical History:   Diagnosis Date     Anisometropia      Cataract, right eye      Cervical radiculopathy      High myopia      Myopic degeneration      Posterior staphyloma     LE     Pseudophakia of left eye      Retinal detachment RE 1971,[id number]    BE     Past Surgical History:   Procedure Laterality Date     APPENDECTOMY       CATARACT IOL, RT/LT Bilateral 11/14/12LE 7/1/14RE    BE     COLONOSCOPY N/A 5/25/2018    Procedure: COLONOSCOPY;  Screening Colonoscopy;  Surgeon: Zak Kaplan MD;  Location: [facility] OR     PHACOEMULSIFICATION CLEAR CORNEA WITH STANDARD INTRAOCULAR LENS IMPLANT  7/1/2014    Procedure: PHACOEMULSIFICATION CLEAR CORNEA WITH STANDARD INTRAOCULAR LENS IMPLANT;  Surgeon: Milan Bernardo MD;  Location:  [facility]     SCLERAL BUCKLE  1971    RE     SCLERAL BUCKLE  1980    LE     YAG CAPSULOTOMY OD (RIGHT EYE)  10/30/2014       Social History:   reports that he has never smoked. He has never used smokeless tobacco. He reports current alcohol use. He reports that he does not use drugs.    Family History:  Family History   Problem Relation Age of Onset     Dementia Mother      Heart Failure Father      Asthma Father      Diabetes Paternal Grandfather      Cancer No family hx of      Glaucoma No family hx of      Macular Degeneration No family hx of      Skin Cancer No family hx of      Melanoma No family hx of         Medications:  Current Outpatient Medications   Medication Sig Dispense Refill     CALCIUM-MAG-VIT C-VIT D PO Take by mouth 2 times daily       nortriptyline (PAMELOR) 25 MG capsule 125 mg daily  2     Omega-3 Fatty Acids (OMEGA-3 FISH OIL PO) Take by mouth daily       Saw Palmetto, Serenoa repens, (SAW PALMETTO EXTRACT PO) Take by mouth daily       VITAMIN D, CHOLECALCIFEROL, PO Take by mouth daily       gabapentin (NEURONTIN) 100 MG capsule  (Patient not taking: Reported on 7/22/2022)  1     tacrolimus (PROTOPIC) 0.1 % external ointment Apply topically 2 times daily (Patient not taking: Reported on 7/22/2022) 60 g 2     Allergies   Allergen Reactions     Ranitidine Unknown

## 2022-07-22 NOTE — PROGRESS NOTES
[facility] Dermatology Note    Encounter Date: Jul 22, 2022    Dermatology Problem List:  1. 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 milial cyst, shaft of the penis, s/p I&D 1/11/19   3. Poorly demarcated erythematous patch involving gluteal cleft   -s/p skin culture 7/11/19, triamcinolone cream, ketoconazole cream, mupirocin  -s/p punch biopsy 9/12/19, c/w lichen simplex chronicus  -current Rx: tacrolimus BID PRN        ______________________________________    Impression/Plan:  1. Irritant dermatitis with lichenification: on buttocks, worsening since prior. Suspect due irritation/friction from long runs (16 mi/week). We discussed restarting tacrolimus, which has previously been effective, as well as using body glide as lubricant while running, which may also be effective. Not currently consistent with infection at this time.  - restart tacrolimus ointment  - OTC body glide with runs      Follow-up in 3 months.       Staff Involved:  Staff Only    Varinder Bell MD   of Dermatology  Department of Dermatology  [facility]      CC:   Chief Complaint   Patient presents with     Derm Problem     Pt states about a month ago or longer, he started noticing something that feels like a ridge on his gluteal cleft that he is concerned about, but it is not interfering with activities of daily living.       History of Present Illness:  Mr. Jefry Hatfield is a 71 year old male who presents as a return patient.    Rash on buttocks - previously resolved after cessation of rowing machine use and tacrolimus ointment  - now has new rash - feels more vascular  - not responsive to topicals at home  - some mild tenderness    Labs:  N/A    Physical exam:  Vitals: There were no vitals taken for this visit.  GEN: This is a well developed, well-nourished male in no acute distress, in a pleasant mood.    SKIN: Artis phototype II  - Focused  examination of the buttocks was performed.  - erythematous scaly patches with lichenification, including fissured lichenified papule, on the buttocks  - No other lesions of concern on areas examined.     Past Medical History:   Past Medical History:   Diagnosis Date     Anisometropia      Cataract, right eye      Cervical radiculopathy      High myopia      Myopic degeneration      Posterior staphyloma     LE     Pseudophakia of left eye      Retinal detachment RE 1971,[id number]    BE     Past Surgical History:   Procedure Laterality Date     APPENDECTOMY       CATARACT IOL, RT/LT Bilateral 11/14/12LE 7/1/14RE    BE     COLONOSCOPY N/A 5/25/2018    Procedure: COLONOSCOPY;  Screening Colonoscopy;  Surgeon: Zak Kaplan MD;  [facility]     PHACOEMULSIFICATION CLEAR CORNEA WITH STANDARD INTRAOCULAR LENS IMPLANT  7/1/2014    Procedure: PHACOEMULSIFICATION CLEAR CORNEA WITH STANDARD INTRAOCULAR LENS IMPLANT;  Surgeon: Milan Bernardo MD;  [facility]     SCLERAL BUCKLE  1971    RE     SCLERAL BUCKLE  1980    LE     YAG CAPSULOTOMY OD (RIGHT EYE)  10/30/2014       Social History:   reports that he has never smoked. He has never used smokeless tobacco. He reports current alcohol use. He reports that he does not use drugs.    Family History:  Family History   Problem Relation Age of Onset     Dementia Mother      Heart Failure Father      Asthma Father      Diabetes Paternal Grandfather      Cancer No family hx of      Glaucoma No family hx of      Macular Degeneration No family hx of      Skin Cancer No family hx of      Melanoma No family hx of        Medications:  Current Outpatient Medications   Medication Sig Dispense Refill     CALCIUM-MAG-VIT C-VIT D PO Take by mouth 2 times daily       nortriptyline (PAMELOR) 25 MG capsule 125 mg daily  2     Omega-3 Fatty Acids (OMEGA-3 FISH OIL PO) Take by mouth daily       Saw Palmetto, Serenoa repens, (SAW PALMETTO EXTRACT PO) Take by mouth daily        VITAMIN D, CHOLECALCIFEROL, PO Take by mouth daily       gabapentin (NEURONTIN) 100 MG capsule  (Patient not taking: Reported on 7/22/2022)  1     tacrolimus (PROTOPIC) 0.1 % external ointment Apply topically 2 times daily (Patient not taking: Reported on 7/22/2022) 60 g 2     Allergies   Allergen Reactions     Ranitidine Unknown

## 2022-07-25 ENCOUNTER — TELEPHONE (OUTPATIENT)
Dept: DERMATOLOGY | Facility: CLINIC | Age: 72
End: 2022-07-25

## 2022-07-25 NOTE — TELEPHONE ENCOUNTER
Central Prior Authorization Team   Phone: [phone]    PA Initiation    Medication: Tacrolimus 0.1% Ointment  Insurance Company: [employer] Part D - Phone [phone] Fax [phone]  Pharmacy Filling the Rx: [id number] - [place], [place] - [address] AT [facility] [address]  Filling Pharmacy Phone: [phone]  Filling Pharmacy Fax: [phone]  Start Date: 7/25/2022

## 2022-07-25 NOTE — TELEPHONE ENCOUNTER
Please initiate prior authorization for the Tacrolimus 0.1% Ointment. Thank you!    Kofi Fuentes  In Clinic Visit Facilitator

## 2022-07-27 NOTE — TELEPHONE ENCOUNTER
Prior Authorization Approval    Authorization Effective Date: 7/25/2022  Authorization Expiration Date: 12/31/2022  Medication: Tacrolimus 0.1% Ointment  Approved Dose/Quantity:   Reference #:     Insurance Company: [employer] Part D - Phone [phone] Fax [phone]  Expected CoPay:       CoPay Card Available:      Foundation Assistance Needed:    Which Pharmacy is filling the prescription (Not needed for infusion/clinic administered): [id number] - [place], [place] - [address] AT [facility] [address]  Pharmacy Notified: Yes- **Instructed pharmacy to notify patient when script is ready to /ship.**  Patient Notified: Yes

## 2022-09-12 ENCOUNTER — OFFICE VISIT (OUTPATIENT)
Dept: ORTHOPEDICS | Facility: CLINIC | Age: 72
End: 2022-09-12
Payer: COMMERCIAL

## 2022-09-12 DIAGNOSIS — M76.32 ILIOTIBIAL BAND SYNDROME OF BOTH SIDES: Primary | ICD-10-CM

## 2022-09-12 DIAGNOSIS — M76.31 ILIOTIBIAL BAND SYNDROME OF BOTH SIDES: Primary | ICD-10-CM

## 2022-09-12 PROCEDURE — 99203 OFFICE O/P NEW LOW 30 MIN: CPT | Performed by: FAMILY MEDICINE

## 2022-09-12 NOTE — LETTER
9/12/2022      RE: Jefry Hatfield  [address]  [place] [address]     Dear Colleague,    Thank you for referring your patient, Jefry Hatfield, to the [facility]. Please see a copy of my visit note below.    ASSESSMENT/PLAN:    (M76.31,  M76.32) Iliotibial band syndrome of both sides  (primary encounter diagnosis)  Comment: reassuring exam consistent w/ ITB syndrome; will have him continue running and do a short course of PT focusing on stretching and strengthening; f/u prn   Plan: Physical Therapy Referral          Danish Butt MD  September 12, 2022  9:45 AM        Pt is a 71 year old male here today for:     Bilateral Knee pain :   Duration? 1 month   Injury/ Inciting activity? No injury - training for the [facility] marathon on 11/6 - his 72th bday!   Pop? No   Swelling?  None  Limited motion?  None  Locking/ Catching? None   Giving way/ instability? None   Imaging? XR 9/12/22   Treatment? Cross training, ibuprofen   Ramping up mileage since March; up to 16-18 miles  Localizes to lateral knee  Previous marathons were [email or web address] and [place]  Has run since high school - grew up in [place] and running [facility] marathon has been on his bucket list   Swimming and biking during the week and only goes on 1 run/ week       Past Medical History:   Diagnosis Date     Anisometropia      Cataract, right eye      Cervical radiculopathy      High myopia      Myopic degeneration      Posterior staphyloma     LE     Pseudophakia of left eye      Retinal detachment RE 1971,[id number]    BE      Past Surgical History:   Procedure Laterality Date     APPENDECTOMY       CATARACT IOL, RT/LT Bilateral 11/14/12LE 7/1/14RE    BE     COLONOSCOPY N/A 5/25/2018    Procedure: COLONOSCOPY;  Screening Colonoscopy;  Surgeon: Zak Kaplan MD;  [facility]     PHACOEMULSIFICATION CLEAR CORNEA WITH STANDARD INTRAOCULAR LENS IMPLANT  7/1/2014    Procedure: PHACOEMULSIFICATION  CLEAR CORNEA WITH STANDARD INTRAOCULAR LENS IMPLANT;  Surgeon: Milan Bernardo MD;  [facility]     SCLERAL BUCKLE  1971    RE     SCLERAL BUCKLE  1980    LE     YAG CAPSULOTOMY OD (RIGHT EYE)  10/30/2014      Current Outpatient Medications   Medication Sig Dispense Refill     CALCIUM-MAG-VIT C-VIT D PO Take by mouth 2 times daily       gabapentin (NEURONTIN) 100 MG capsule  (Patient not taking: Reported on 7/22/2022)  1     nortriptyline (PAMELOR) 25 MG capsule 125 mg daily  2     Omega-3 Fatty Acids (OMEGA-3 FISH OIL PO) Take by mouth daily       Saw Palmetto, Serenoa repens, (SAW PALMETTO EXTRACT PO) Take by mouth daily       tacrolimus (PROTOPIC) 0.1 % external ointment Apply topically 2 times daily 60 g 2     triamcinolone (KENALOG) 0.1 % external ointment Apply topically 2 times daily 80 g 3     VITAMIN D, CHOLECALCIFEROL, PO Take by mouth daily        Allergies   Allergen Reactions     Ranitidine Unknown      ROS:   Gen- no fevers/chills   Rheum - no morning stiffness   Derm - no rash/ redness   Neuro - no numbness, no tingling   Remainder of ROS negative.     Exam:   There were no vitals taken for this visit.     Bilateral Knees:   ROM: 0-130; Crepitus: Mild    Effusion: NO ; Swelling: NO   Strength: Full in flexion/ extension   Tenderness: Patella - NO Medial joint line - NO; Lateral joint line - NO; Quad tendon - NO; Patellar tendon- NO; Hamstring - no.   Cruciates: anterior drawer - neg/posterior drawer -neg. Lachman - neg   Collaterals: varus -neg/valgus -neg.   Patella: patellar compression - neg; single leg bend- neg   Meniscus: Yamileth - neg; Thessaly - neg   Maneuvers: Niall - POS bilateral, R tighter than L    Xray of Bilateral knees on September 12, 2022 at [facility] [place] location - films personally reviewed with patient at time of visit     My impression: very mild PF OA          Again, thank you for allowing me to participate in the care of your patient.      Sincerely,    Danish Butt MD

## 2022-09-12 NOTE — PROGRESS NOTES
ASSESSMENT/PLAN:    (M76.31,  M76.32) Iliotibial band syndrome of both sides  (primary encounter diagnosis)  Comment: reassuring exam consistent w/ ITB syndrome; will have him continue running and do a short course of PT focusing on stretching and strengthening; f/u prn   Plan: Physical Therapy Referral          Danish Butt MD  September 12, 2022  9:45 AM        Pt is a 71 year old male here today for:     Bilateral Knee pain :   Duration? 1 month   Injury/ Inciting activity? No injury - training for the [facility] marathon on 11/6 - his 72th bday!   Pop? No   Swelling?  None  Limited motion?  None  Locking/ Catching? None   Giving way/ instability? None   Imaging? XR 9/12/22   Treatment? Cross training, ibuprofen   Ramping up mileage since March; up to 16-18 miles  Localizes to lateral knee  Previous marathons were [facility] and [place]  Has run since high school - grew up in [place] and running [facility] marathon has been on his bucket list   Swimming and biking during the week and only goes on 1 run/ week       Past Medical History:   Diagnosis Date     Anisometropia      Cataract, right eye      Cervical radiculopathy      High myopia      Myopic degeneration      Posterior staphyloma     LE     Pseudophakia of left eye      Retinal detachment RE 1971,[id number]    BE      Past Surgical History:   Procedure Laterality Date     APPENDECTOMY       CATARACT IOL, RT/LT Bilateral 11/14/12LE 7/1/14RE    BE     COLONOSCOPY N/A 5/25/2018    Procedure: COLONOSCOPY;  Screening Colonoscopy;  Surgeon: Zak Kaplan MD;  [facility]     PHACOEMULSIFICATION CLEAR CORNEA WITH STANDARD INTRAOCULAR LENS IMPLANT  7/1/2014    Procedure: PHACOEMULSIFICATION CLEAR CORNEA WITH STANDARD INTRAOCULAR LENS IMPLANT;  Surgeon: Milan Bernardo MD;  [facility]     SCLERAL BUCKLE  1971    RE     SCLERAL BUCKLE  1980    LE     YAG CAPSULOTOMY OD (RIGHT EYE)  10/30/2014      Current Outpatient Medications   Medication Sig Dispense  Refill     CALCIUM-MAG-VIT C-VIT D PO Take by mouth 2 times daily       gabapentin (NEURONTIN) 100 MG capsule  (Patient not taking: Reported on 7/22/2022)  1     nortriptyline (PAMELOR) 25 MG capsule 125 mg daily  2     Omega-3 Fatty Acids (OMEGA-3 FISH OIL PO) Take by mouth daily       Saw Palmetto, Serenoa repens, (SAW PALMETTO EXTRACT PO) Take by mouth daily       tacrolimus (PROTOPIC) 0.1 % external ointment Apply topically 2 times daily 60 g 2     triamcinolone (KENALOG) 0.1 % external ointment Apply topically 2 times daily 80 g 3     VITAMIN D, CHOLECALCIFEROL, PO Take by mouth daily        Allergies   Allergen Reactions     Ranitidine Unknown      ROS:   Gen- no fevers/chills   Rheum - no morning stiffness   Derm - no rash/ redness   Neuro - no numbness, no tingling   Remainder of ROS negative.     Exam:   There were no vitals taken for this visit.     Bilateral Knees:   ROM: 0-130; Crepitus: Mild    Effusion: NO ; Swelling: NO   Strength: Full in flexion/ extension   Tenderness: Patella - NO Medial joint line - NO; Lateral joint line - NO; Quad tendon - NO; Patellar tendon- NO; Hamstring - no.   Cruciates: anterior drawer - neg/posterior drawer -neg. Lachman - neg   Collaterals: varus -neg/valgus -neg.   Patella: patellar compression - neg; single leg bend- neg   Meniscus: Yamileth - neg; Thessaly - neg   Maneuvers: Niall - POS bilateral, R tighter than L    [place] of B[place] [facility] [place] personally reviewed with patient at time of visit     My impression: very mild PF OA

## 2022-09-15 ENCOUNTER — THERAPY VISIT (OUTPATIENT)
Dept: PHYSICAL THERAPY | Facility: CLINIC | Age: 72
End: 2022-09-15
Payer: COMMERCIAL

## 2022-09-15 DIAGNOSIS — M76.31 ILIOTIBIAL BAND SYNDROME OF BOTH SIDES: ICD-10-CM

## 2022-09-15 DIAGNOSIS — M76.32 ILIOTIBIAL BAND SYNDROME OF BOTH SIDES: ICD-10-CM

## 2022-09-15 PROCEDURE — 97530 THERAPEUTIC ACTIVITIES: CPT | Mod: GP | Performed by: PHYSICAL THERAPIST

## 2022-09-15 PROCEDURE — 97161 PT EVAL LOW COMPLEX 20 MIN: CPT | Mod: GP | Performed by: PHYSICAL THERAPIST

## 2022-09-15 PROCEDURE — 97110 THERAPEUTIC EXERCISES: CPT | Mod: GP | Performed by: PHYSICAL THERAPIST

## 2022-09-15 NOTE — PROGRESS NOTES
[facility]  Physical Therapy Initial Examination/Evaluation  September 15, 2022    Jefry Hatfield is a 71 year old  male referred to physical therapy by Dr. Butt for treatment of IT band with Precautions/Restrictions/MD instructions none    KEY PT FINDINGS:  1.  Poor balance, dynamic valgus in single leg squat  2.  Slow running gait speed      Subjective:  Referring MD visit date: 9/12/22  DOI/onset: one month ago  Mechanism of injury: training for [place] marathon  DOS none  Previous treatment: rest  Imaging: xray  Chief Complaint:   Pain at mile 14 with running   Pain: best 0 /10, worst 4/10 distal IT band Described as: aching Alleviated by: rest Frequency: intermittent Progression of symptoms since initial onset: staying the same Time of day when pain is worse: not related  Sleeping: not affected  Social history:      Occupation: retired   Job duties:  none    Current HEP/exercise regimen: swimming, cycling, running  Patient's goals are run [place] marathon    Pertinent PMH: see Epic   General Health Reported by Patient: good  Return to MD:  PRN  Running shoes: Newtons         Lower Extremity Exam    Dynamic Movement Screen:  2 leg stance: wnl  2 leg squat:Excessive anterior knee excursion (reduced posterior hip excursion)    1 leg stance:   Right: proprioceptive challenge  Left: proprioceptive challenge    1 leg squat:   Right: proprioceptive challenge, excessive contralateral pelvic drop, excessive femoral IR/ADD and excessive anterior knee excursion  Left: proprioceptive challenge, excessive contralateral pelvic drop, excessive femoral IR/ADD and excessive anterior knee excursion    Gait: running gait demonstrated in hallway reveals slow speed    Patellofemoral Joint Examination:  Test Right Left   Patellar Orientation:   90 deg flexion neutral neutral   OK Patellar Tracking Normal Normal   Patellar Orientation:  0 deg flexion neutral neutral   Quadrant  Mobility (Med:Lat) 1:1  1:1   Effusion 0 0   Quad Activation Normal Normal     Knee Joint AROM   Right Left Difference   Hyperextension 0 deg 0 deg 0 deg   Extension 5 deg 5 deg 0 deg   Flexion 135 deg 135 deg 0 deg     Palpation:   Tender to palpation at the following structures: distal ITB    Hip Joint PROM Screen   Right Left Difference   Flex 100 deg 100 deg 0 deg   ER 60 deg 60 deg 0 deg   IR 30 deg 30 deg 0 deg     Lower Extremity Muscle Strength (x/5)   Right Left   Hip ABD 4+/5 4+/5                   Assessment/Plan      Patient is a 71 year old male with right side knee complaints.    Patient has the following significant findings with corresponding treatment plan.                Diagnosis 1:  IT band syndrome    Pain -  hot/cold therapy, US, electric stimulation, manual therapy, splint/taping/bracing/orthotics, self management, education, and home program  Decreased ROM/flexibility - manual therapy and therapeutic exercise  Decreased joint mobility - manual therapy and therapeutic exercise  Decreased strength - therapeutic exercise and therapeutic activities  Impaired balance - neuro re-education and therapeutic activities  Decreased proprioception - neuro re-education and therapeutic activities  Inflammation - cold therapy  Edema - vasopneumatics  Impaired gait - gait training  Impaired muscle performance - neuro re-education  Decreased function - therapeutic activities    Therapy Evaluation Codes:   1) History comprised of:   Personal factors that impact the plan of care:      None.    Comorbidity factors that impact the plan of care are:      None.     Medications impacting care: None.  2) Examination of Body Systems comprised of:   Body structures and functions that impact the plan of care:      Knee.   Activity limitations that impact the plan of care are:      Running.  3) Clinical presentation characteristics are:   Stable/Uncomplicated.  4) Decision-Making    Low complexity using standardized  patient assessment instrument and/or measureable assessment of functional outcome.  Cumulative Therapy Evaluation is: Low complexity.    Previous and current functional limitations:  (See Goal Flow Sheet for this information)    Short term and Long term goals: (See Goal Flow Sheet for this information)     Communication ability:  Patient appears to be able to clearly communicate and understand verbal and written communication and follow directions correctly.  Treatment Explanation - The following has been discussed with the patient:   RX ordered/plan of care  Anticipated outcomes  Possible risks and side effects  This patient would benefit from PT intervention to resume normal activities.   Rehab potential is good.    Frequency:  1 X week, once daily  Duration:  for 6 weeks  Discharge Plan:  Achieve all LTG.  Independent in home treatment program.  Reach maximal therapeutic benefit.    Please refer to the daily flowsheet for treatment today, total treatment time and time spent performing 1:1 timed codes.

## 2022-09-29 ENCOUNTER — THERAPY VISIT (OUTPATIENT)
Dept: PHYSICAL THERAPY | Facility: CLINIC | Age: 72
End: 2022-09-29
Payer: COMMERCIAL

## 2022-09-29 DIAGNOSIS — M76.32 ILIOTIBIAL BAND SYNDROME OF BOTH SIDES: Primary | ICD-10-CM

## 2022-09-29 DIAGNOSIS — M76.31 ILIOTIBIAL BAND SYNDROME OF BOTH SIDES: Primary | ICD-10-CM

## 2022-09-29 PROCEDURE — 97530 THERAPEUTIC ACTIVITIES: CPT | Mod: GP | Performed by: PHYSICAL THERAPIST

## 2022-09-29 PROCEDURE — 97110 THERAPEUTIC EXERCISES: CPT | Mod: GP | Performed by: PHYSICAL THERAPIST

## 2022-10-20 NOTE — PROGRESS NOTES
HPI:    Mr. Hatfield comes to establish care today. Overall he is doing well. He is planning to run the [employer] this weekend. He does not smoke. He does not abuse EtOH. He has two living sons. One of his sons  unexpectedly last year. He has some minor musculoskeletal complaints. No other HEENT, cardiopulmonary, abdominal, , neurological, systemic, psychiatric, lymphatic, endocrine, vascular complaints.     Past Medical History:   Diagnosis Date     Anisometropia      Cataract, right eye      Cervical radiculopathy      High myopia      Myopic degeneration      Posterior staphyloma     LE     Pseudophakia of left eye      Retinal detachment RE ,[id number]    BE     Past Surgical History:   Procedure Laterality Date     APPENDECTOMY       CATARACT IOL, RT/LT Bilateral 12LE 14RE    BE     COLONOSCOPY N/A 2018    Procedure: COLONOSCOPY;  Screening Colonoscopy;  Surgeon: Zak Kaplan MD;  [facility]     PHACOEMULSIFICATION CLEAR CORNEA WITH STANDARD INTRAOCULAR LENS IMPLANT  2014    Procedure: PHACOEMULSIFICATION CLEAR CORNEA WITH STANDARD INTRAOCULAR LENS IMPLANT;  Surgeon: Milan Bernardo MD;  [facility]     SCLERAL BUCKLE      RE     SCLERAL BUCKLE      LE     YAG CAPSULOTOMY OD (RIGHT EYE)  10/30/2014     PE:    Vitals noted, gen, nad, cooperative, alert, neck supple nl rom, no B carotid bruits, lung with good air movement, RRR, S1, S2, no MRG, abdomen, no acute findings, rectal exam, normal prostate, no masses, no tenderness. He has excellent B tibialis anterior pulses. No B LE swelling. Grossly normal neurological exam.     A/P:    1. PT appt. Today 10/21/2022  2. Dermatology appt. With Dr. Garcia scheduled for 2022  3. Immunizations; He has completed the Shingrix vaccine series Tdap 3/27/2018. Prevnar 13 done 3/27/2018. COVID Moderna vaccine x 5 including Omicron on 10/9/2022. Influenza vaccine today; he declines.   4. Colonoscopy; 2018  and repeat in 5 years  5. PSA; 0.87 on 3/27/2018  6. Lipids; 3/27/2018 , HDL 46 and TG's 116.   7. B Iliotibal band syndrome, seen 9/12/2022 by Dr. Butt and he is getting PT.     I would see him back in 2 months.     30 minutes spent on the date of the encounter doing chart review, history and exam, documentation and further activities as noted above exclusive of procedures and other billable interpretations

## 2022-10-21 ENCOUNTER — DOCUMENTATION ONLY (OUTPATIENT)
Dept: FAMILY MEDICINE | Facility: CLINIC | Age: 72
End: 2022-10-21

## 2022-10-21 ENCOUNTER — OFFICE VISIT (OUTPATIENT)
Dept: INTERNAL MEDICINE | Facility: CLINIC | Age: 72
End: 2022-10-21
Payer: COMMERCIAL

## 2022-10-21 ENCOUNTER — LAB (OUTPATIENT)
Dept: LAB | Facility: CLINIC | Age: 72
End: 2022-10-21
Payer: COMMERCIAL

## 2022-10-21 ENCOUNTER — THERAPY VISIT (OUTPATIENT)
Dept: PHYSICAL THERAPY | Facility: CLINIC | Age: 72
End: 2022-10-21
Payer: COMMERCIAL

## 2022-10-21 VITALS
OXYGEN SATURATION: 99 % | WEIGHT: 170.2 LBS | DIASTOLIC BLOOD PRESSURE: 81 MMHG | HEIGHT: 72 IN | SYSTOLIC BLOOD PRESSURE: 123 MMHG | HEART RATE: 68 BPM | BODY MASS INDEX: 23.05 KG/M2

## 2022-10-21 DIAGNOSIS — M76.31 ILIOTIBIAL BAND SYNDROME OF BOTH SIDES: Primary | ICD-10-CM

## 2022-10-21 DIAGNOSIS — M76.32 ILIOTIBIAL BAND SYNDROME OF BOTH SIDES: Primary | ICD-10-CM

## 2022-10-21 DIAGNOSIS — Z12.5 SCREENING FOR PROSTATE CANCER: ICD-10-CM

## 2022-10-21 DIAGNOSIS — E78.00 HIGH BLOOD CHOLESTEROL: ICD-10-CM

## 2022-10-21 DIAGNOSIS — E55.9 VITAMIN D DEFICIENCY: ICD-10-CM

## 2022-10-21 DIAGNOSIS — E78.00 HIGH BLOOD CHOLESTEROL: Primary | ICD-10-CM

## 2022-10-21 LAB
ALBUMIN SERPL BCG-MCNC: 4.2 G/DL (ref 3.5–5.2)
ALP SERPL-CCNC: 153 U/L (ref 40–129)
ALT SERPL W P-5'-P-CCNC: 17 U/L (ref 10–50)
ANION GAP SERPL CALCULATED.3IONS-SCNC: 7 MMOL/L (ref 7–15)
AST SERPL W P-5'-P-CCNC: 21 U/L (ref 10–50)
BASOPHILS # BLD AUTO: 0.1 10E3/UL (ref 0–0.2)
BASOPHILS NFR BLD AUTO: 1 %
BILIRUB SERPL-MCNC: 0.4 MG/DL
BUN SERPL-MCNC: 20.1 MG/DL (ref 8–23)
CALCIUM SERPL-MCNC: 9.9 MG/DL (ref 8.8–10.2)
CHLORIDE SERPL-SCNC: 103 MMOL/L (ref 98–107)
CHOLEST SERPL-MCNC: 202 MG/DL
CREAT SERPL-MCNC: 1.1 MG/DL (ref 0.67–1.17)
DEPRECATED HCO3 PLAS-SCNC: 30 MMOL/L (ref 22–29)
EOSINOPHIL # BLD AUTO: 0.1 10E3/UL (ref 0–0.7)
EOSINOPHIL NFR BLD AUTO: 3 %
ERYTHROCYTE [DISTWIDTH] IN BLOOD BY AUTOMATED COUNT: 12.1 % (ref 10–15)
GFR SERPL CREATININE-BSD FRML MDRD: 73 ML/MIN/1.73M2
GLUCOSE SERPL-MCNC: 104 MG/DL (ref 70–99)
HCT VFR BLD AUTO: 44.8 % (ref 40–53)
HDLC SERPL-MCNC: 59 MG/DL
HGB BLD-MCNC: 14.7 G/DL (ref 13.3–17.7)
IMM GRANULOCYTES # BLD: 0 10E3/UL
IMM GRANULOCYTES NFR BLD: 0 %
LDLC SERPL CALC-MCNC: 132 MG/DL
LYMPHOCYTES # BLD AUTO: 1.6 10E3/UL (ref 0.8–5.3)
LYMPHOCYTES NFR BLD AUTO: 34 %
MCH RBC QN AUTO: 30.6 PG (ref 26.5–33)
MCHC RBC AUTO-ENTMCNC: 32.8 G/DL (ref 31.5–36.5)
MCV RBC AUTO: 93 FL (ref 78–100)
MONOCYTES # BLD AUTO: 0.5 10E3/UL (ref 0–1.3)
MONOCYTES NFR BLD AUTO: 11 %
NEUTROPHILS # BLD AUTO: 2.3 10E3/UL (ref 1.6–8.3)
NEUTROPHILS NFR BLD AUTO: 51 %
NONHDLC SERPL-MCNC: 143 MG/DL
NRBC # BLD AUTO: 0 10E3/UL
NRBC BLD AUTO-RTO: 0 /100
PLATELET # BLD AUTO: 213 10E3/UL (ref 150–450)
POTASSIUM SERPL-SCNC: 4.4 MMOL/L (ref 3.4–5.3)
PROT SERPL-MCNC: 6.8 G/DL (ref 6.4–8.3)
PSA SERPL-MCNC: 1.53 NG/ML (ref 0–4.5)
RBC # BLD AUTO: 4.8 10E6/UL (ref 4.4–5.9)
SODIUM SERPL-SCNC: 140 MMOL/L (ref 136–145)
TRIGL SERPL-MCNC: 53 MG/DL
WBC # BLD AUTO: 4.6 10E3/UL (ref 4–11)

## 2022-10-21 PROCEDURE — G0103 PSA SCREENING: HCPCS | Performed by: INTERNAL MEDICINE

## 2022-10-21 PROCEDURE — 82306 VITAMIN D 25 HYDROXY: CPT | Performed by: INTERNAL MEDICINE

## 2022-10-21 PROCEDURE — 97530 THERAPEUTIC ACTIVITIES: CPT | Mod: GP | Performed by: PHYSICAL THERAPIST

## 2022-10-21 PROCEDURE — 97140 MANUAL THERAPY 1/> REGIONS: CPT | Mod: GP | Performed by: PHYSICAL THERAPIST

## 2022-10-21 PROCEDURE — 36415 COLL VENOUS BLD VENIPUNCTURE: CPT | Performed by: PATHOLOGY

## 2022-10-21 PROCEDURE — 85025 COMPLETE CBC W/AUTO DIFF WBC: CPT | Performed by: PATHOLOGY

## 2022-10-21 PROCEDURE — 99214 OFFICE O/P EST MOD 30 MIN: CPT | Performed by: INTERNAL MEDICINE

## 2022-10-21 PROCEDURE — 80061 LIPID PANEL: CPT | Performed by: INTERNAL MEDICINE

## 2022-10-21 PROCEDURE — 80053 COMPREHEN METABOLIC PANEL: CPT | Performed by: PATHOLOGY

## 2022-10-21 NOTE — PROGRESS NOTES
Jing Mulligan, PT  Danish Butt MD Hi Rahul, George has been doing great with his IT band pain, however came in today saying his thigh was diffusely painful (making me worry about stress fracture), but upon further questioning seems like it is indeed IT band, or maybe a bit of radiating pain from his back.  Fulcrum test was negative for provoking sxs.  I advised him to get in with one of the sports docs (looks like you are out :)) next week if sxs are persisting prior to [facility]6.  Thanks!     Marbella

## 2022-10-21 NOTE — NURSING NOTE
Jefry Hatfield is a 71 year old male that presents in clinic today for the following:     Chief Complaint   Patient presents with     Establish Care       The patient's allergies and medications were reviewed. The patient's vitals were obtained without incident. The patient does not have any other questions for the provider.     Gopi Park, EMT at 7:06 AM on 10/21/2022.  Primary Care Clinic: [phone]

## 2022-10-22 LAB — DEPRECATED CALCIDIOL+CALCIFEROL SERPL-MC: 44 UG/L (ref 20–75)

## 2022-10-24 ENCOUNTER — MYC MEDICAL ADVICE (OUTPATIENT)
Dept: INTERNAL MEDICINE | Facility: CLINIC | Age: 72
End: 2022-10-24

## 2022-10-25 NOTE — TELEPHONE ENCOUNTER
Provider indicated that labs can be redone next visit, no further action  Riki Trujillo, EMT at 5:16 PM on 10/25/2022

## 2022-10-29 ENCOUNTER — HEALTH MAINTENANCE LETTER (OUTPATIENT)
Age: 72
End: 2022-10-29

## 2022-11-14 ENCOUNTER — OFFICE VISIT (OUTPATIENT)
Dept: ORTHOPEDICS | Facility: CLINIC | Age: 72
End: 2022-11-14
Payer: COMMERCIAL

## 2022-11-14 VITALS — WEIGHT: 170 LBS | HEIGHT: 72 IN | BODY MASS INDEX: 23.03 KG/M2

## 2022-11-14 DIAGNOSIS — M21.42 PES PLANUS OF BOTH FEET: ICD-10-CM

## 2022-11-14 DIAGNOSIS — M25.562 CHRONIC PAIN OF LEFT KNEE: Primary | ICD-10-CM

## 2022-11-14 DIAGNOSIS — M21.41 PES PLANUS OF BOTH FEET: ICD-10-CM

## 2022-11-14 DIAGNOSIS — G89.29 CHRONIC PAIN OF LEFT KNEE: Primary | ICD-10-CM

## 2022-11-14 PROCEDURE — 99213 OFFICE O/P EST LOW 20 MIN: CPT | Performed by: FAMILY MEDICINE

## 2022-11-14 NOTE — TELEPHONE ENCOUNTER
DIAGNOSIS: Pes planus of both feet   APPOINTMENT DATE: 12.7.22   NOTES STATUS DETAILS   OFFICE NOTE from referring provider Internal 11.14.22 Dr Danish Butt, [facility]   MEDICATION LIST Internal    LABS     CBC/DIFF Internal

## 2022-11-14 NOTE — PROGRESS NOTES
ASSESSMENT/PLAN:    (M25.562,  G89.29) Chronic pain of left knee  (primary encounter diagnosis)  Comment:   Plan: reassuring exam today w/o features of a stress fx; recommended he re-engage w/ [facility] in PT and resume training program; if concerning features arise, he will contact me for additional imaging; precautions given; f/u prn    (M21.41,  M21.42) Pes planus of both feet  Comment: referral to podiatry for new orthotics as he has used current pair for several years  Plan: Orthopedic  Referral          Danish Butt MD  November 14, 2022  11:17 AM      Pt is a 72 year old male last seen on 9/12/22 here for follow up of:     L Knee pain  Successfully finished the [facility] marathon.   Pain is lingering in L thigh  Swam yesterday       Per [employer] message on 10/24/22:  Dear Dr Butt,  I wanted to follow up with you and let you know of my plan.  I've been working with [facility] in PT, the plan has been to develop more strength in quads and gluts and to  utilize taping the IT band.  I'm still not there yet wiht the strengthening stuff, but the IT taping while not being the answer seems to be working.  I saw [facility] on Friday, Sept 21 and I told her the soreness/stiffness in my left quad didn't seem to be going away.  We talked abou the possibility of a stress fracture and I had hoped to see you but that didn't look like it was going to work out, I'm leaving to head to [facility] for the marathon Monday, the 31st.  The plan that [facility] and I talked about was run 20 this past Saturday and see how it felt.  I did and I would say that it did not seem to get worse, again, more stiffness/soreness than pain, also feeling it a bit in my left hip and lower back.  Anyway, I would like to see you when I get back from the marathon  which is on Nov 6th, I should be back by Nov 14th.  I don't intend to run more, but I would like to continue running and I'd like to try and get to the bottom of this if  possible.  Thanks again for your help.    Per my  last note:  (M76.31,  M76.32) Iliotibial band syndrome of both sides  (primary encounter diagnosis)  Comment: reassuring exam consistent w/ ITB syndrome; will have him continue running and do a short course of PT focusing on stretching and strengthening; f/u prn   Plan: Physical Therapy Referral    Past Medical History:   Diagnosis Date     Anisometropia      Cataract, right eye      Cervical radiculopathy      High myopia      Myopic degeneration      Posterior staphyloma     LE     Pseudophakia of left eye      Retinal detachment RE 1971,[id number]    BE      Current Outpatient Medications   Medication Sig Dispense Refill     CALCIUM-MAG-VIT C-VIT D PO Take by mouth 2 times daily       gabapentin (NEURONTIN) 100 MG capsule  (Patient not taking: Reported on 10/21/2022)  1     nortriptyline (PAMELOR) 25 MG capsule 125 mg daily  2     Omega-3 Fatty Acids (OMEGA-3 FISH OIL PO) Take by mouth daily       Saw Palmetto, Serenoa repens, (SAW PALMETTO EXTRACT PO) Take by mouth daily       VITAMIN D, CHOLECALCIFEROL, PO Take by mouth daily        Allergies   Allergen Reactions     Ranitidine Unknown      ROS:   Gen- no fevers/chills   Rheum - no morning stiffness   Derm - no rash/ redness   Neuro - no numbness, no tingling   Remainder of ROS negative.     Exam:     L knee:  Neg fulcrum  Neg hop  Full ROM in knee  No effusion  No bony tenderness  Neg meniscal signs  Localizes pain along distal ITB

## 2022-11-14 NOTE — LETTER
11/14/2022      RE: Jefry Hatfield  [address]  [place] [address]     Dear Colleague,    Thank you for referring your patient, Jefry Hatfield, to the [facility] [place]. Please see a copy of my visit note below.    ASSESSMENT/PLAN:    (M25.562,  G89.29) Chronic pain of left knee  (primary encounter diagnosis)  Comment:   Plan: reassuring exam today w/o features of a stress fx; recommended he re-engage w/ [facility] in PT and resume training program; if concerning features arise, he will contact me for additional imaging; precautions given; f/u prn    (M21.41,  M21.42) Pes planus of both feet  Comment: referral to podiatry for new orthotics as he has used current pair for several years  Plan: Orthopedic  Referral          Danish Butt MD  November 14, 2022  11:17 AM      Pt is a 72 year old male last seen on 9/12/22 here for follow up of:     L Knee pain  [facility]   Pain is lingering in L thigh  Swam yesterday       Per [employer] message on 10/24/22:  Dear Dr Butt,  I wanted to follow up with you and let you know of my plan.  I've been working with [facility] in PT, the plan has been to develop more strength in quads and gluts and to  utilize taping the IT band.  I'm still not there yet wiht the strengthening stuff, but the IT taping while not being the answer seems to be working.  I saw [facility] on Friday, Sept 21 and I told her the soreness/stiffness in my left quad didn't seem to be going away.  We talked abou the possibility of a stress fracture and I had hoped to see you but that didn't look like it was going to work out, [facility].  The plan that [facility] and I talked about was run 20 this past Saturday and see how it felt.  I did and I would say that it did not seem to get worse, again, more stiffness/soreness than pain, also feeling it a bit in my left hip and lower back.  Anyway,  I would like to see you when I get back from the marathon  which is on Nov 6th, I should be back by Nov 14th.  I don't intend to run more, but I would like to continue running and I'd like to try and get to the bottom of this if  possible.  Thanks again for your help.    Per my last note:  (M76.31,  M76.32) Iliotibial band syndrome of both sides  (primary encounter diagnosis)  Comment: reassuring exam consistent w/ ITB syndrome; will have him continue running and do a short course of PT focusing on stretching and strengthening; f/u prn   Plan: Physical Therapy Referral    Past Medical History:   Diagnosis Date     Anisometropia      Cataract, right eye      Cervical radiculopathy      High myopia      Myopic degeneration      Posterior staphyloma     LE     Pseudophakia of left eye      Retinal detachment RE 1971,[id number]    BE      Current Outpatient Medications   Medication Sig Dispense Refill     CALCIUM-MAG-VIT C-VIT D PO Take by mouth 2 times daily       gabapentin (NEURONTIN) 100 MG capsule  (Patient not taking: Reported on 10/21/2022)  1     nortriptyline (PAMELOR) 25 MG capsule 125 mg daily  2     Omega-3 Fatty Acids (OMEGA-3 FISH OIL PO) Take by mouth daily       Saw Palmetto, Serenoa repens, (SAW PALMETTO EXTRACT PO) Take by mouth daily       VITAMIN D, CHOLECALCIFEROL, PO Take by mouth daily        Allergies   Allergen Reactions     Ranitidine Unknown      ROS:   Gen- no fevers/chills   Rheum - no morning stiffness   Derm - no rash/ redness   Neuro - no numbness, no tingling   Remainder of ROS negative.     Exam:     L knee:  Neg fulcrum  Neg hop  Full ROM in knee  No effusion  No bony tenderness  Neg meniscal signs  Localizes pain along distal ITB       Again, thank you for allowing me to participate in the care of your patient.      Sincerely,    Danish Butt MD

## 2022-11-16 ENCOUNTER — THERAPY VISIT (OUTPATIENT)
Dept: PHYSICAL THERAPY | Facility: CLINIC | Age: 72
End: 2022-11-16
Payer: COMMERCIAL

## 2022-11-16 DIAGNOSIS — M76.31 ILIOTIBIAL BAND SYNDROME OF BOTH SIDES: Primary | ICD-10-CM

## 2022-11-16 DIAGNOSIS — M76.32 ILIOTIBIAL BAND SYNDROME OF BOTH SIDES: Primary | ICD-10-CM

## 2022-11-16 PROCEDURE — 97140 MANUAL THERAPY 1/> REGIONS: CPT | Mod: GP | Performed by: PHYSICAL THERAPIST

## 2022-11-16 PROCEDURE — 97110 THERAPEUTIC EXERCISES: CPT | Mod: GP | Performed by: PHYSICAL THERAPIST

## 2022-12-07 ENCOUNTER — PRE VISIT (OUTPATIENT)
Dept: ORTHOPEDICS | Facility: CLINIC | Age: 72
End: 2022-12-07

## 2022-12-07 ENCOUNTER — OFFICE VISIT (OUTPATIENT)
Dept: ORTHOPEDICS | Facility: CLINIC | Age: 72
End: 2022-12-07
Payer: COMMERCIAL

## 2022-12-07 DIAGNOSIS — M21.42 PES PLANUS OF BOTH FEET: ICD-10-CM

## 2022-12-07 DIAGNOSIS — M20.42 HAMMER TOES OF BOTH FEET: Primary | ICD-10-CM

## 2022-12-07 DIAGNOSIS — M21.41 PES PLANUS OF BOTH FEET: ICD-10-CM

## 2022-12-07 DIAGNOSIS — M20.41 HAMMER TOES OF BOTH FEET: Primary | ICD-10-CM

## 2022-12-07 PROCEDURE — 99203 OFFICE O/P NEW LOW 30 MIN: CPT | Performed by: PODIATRIST

## 2022-12-07 NOTE — PROGRESS NOTES
Date of Service: 12/7/2022    Chief Complaint   Patient presents with     Consult     Pes planus. Orthotics. Referred by: Dr. Butt          HPI: Jefry is a 72 year old male who presents today for further evaluation of pain in both feet. He relates that he is a keen runner. [facility]. He relate that he has been wearing orthotics for years. Relates that he does get aching in both feet. He has orthotics that are about 35 years old.     Review of Systems: Answers for HPI/ROS submitted by the patient on 12/7/2022  General Symptoms: No  Skin Symptoms: No  HENT Symptoms: No  EYE SYMPTOMS: No  HEART SYMPTOMS: No  LUNG SYMPTOMS: No  INTESTINAL SYMPTOMS: No  URINARY SYMPTOMS: No  REPRODUCTIVE SYMPTOMS: No  SKELETAL SYMPTOMS: No  BLOOD SYMPTOMS: No  NERVOUS SYSTEM SYMPTOMS: No  MENTAL HEALTH SYMPTOMS: No        PMH:   Past Medical History:   Diagnosis Date     Anisometropia      Cataract, right eye      Cervical radiculopathy      High myopia      Myopic degeneration      Posterior staphyloma     LE     Pseudophakia of left eye      Retinal detachment RE 1971,[id number]    BE       PSxH:   Past Surgical History:   Procedure Laterality Date     APPENDECTOMY       CATARACT IOL, RT/LT Bilateral 11/14/12LE 7/1/14RE    BE     COLONOSCOPY N/A 5/25/2018    Procedure: COLONOSCOPY;  Screening Colonoscopy;  Surgeon: Zak Kaplan MD;  Location: [facility] OR     PHACOEMULSIFICATION CLEAR CORNEA WITH STANDARD INTRAOCULAR LENS IMPLANT  7/1/2014    Procedure: PHACOEMULSIFICATION CLEAR CORNEA WITH STANDARD INTRAOCULAR LENS IMPLANT;  Surgeon: Milan Bernardo MD;  Location:  [facility]     SCLERAL BUCKLE  1971    RE     SCLERAL BUCKLE  1980    LE     YAG CAPSULOTOMY OD (RIGHT EYE)  10/30/2014       Allergies: Ranitidine    SH:   Social History     Socioeconomic History     Marital status:      Spouse name: Not on file     Number of children: Not on file     Years of education: Not on file     Highest education  level: Not on file   Occupational History     Occupation:       Employer: [employer]   Tobacco Use     Smoking status: Never     Smokeless tobacco: Never   Substance and Sexual Activity     Alcohol use: Yes     Comment: occaisonal      Drug use: No     Sexual activity: Yes     Partners: Female   Other Topics Concern     Parent/sibling w/ CABG, MI or angioplasty before 65F 55M? Not Asked   Social History Narrative    , works at [employer]     Social Determinants of Health     Financial Resource Strain: Not on file   Food Insecurity: Not on file   Transportation Needs: Not on file   Physical Activity: Not on file   Stress: Not on file   Social Connections: Not on file   Intimate Partner Violence: Not on file   Housing Stability: Not on file       FH:   Family History   Problem Relation Age of Onset     Dementia Mother      Heart Failure Father      Asthma Father      Diabetes Paternal Grandfather      Cancer No family hx of      Glaucoma No family hx of      Macular Degeneration No family hx of      Skin Cancer No family hx of      Melanoma No family hx of        Objective:      PT and DP pulses are 2/4 bilaterally. CRT is instant. Positive pedal hair.   Gross sensation is intact bilaterally.   Equinus is noted bilaterally. No pain with active or passive ROM of the ankle, MTJ, 1st ray, or halluces bilaterally,. Pes panus noted BL with flexor stabilizing hammertoes.   Nails normal bilaterally. No open lesions are noted.     Assessment:   Encounter Diagnoses   Name Primary?     Hammer toes of both feet Yes     Pes planus of both feet          Plan:  - Pt seen and evaluated.  - Orthotics were molded and sent to the lab.  - Activity as tolerated.  - See again PRN.

## 2022-12-07 NOTE — NURSING NOTE
Reason For Visit:   Chief Complaint   Patient presents with     Consult     Pes planus. Orthotics. Referred by: Dr. Butt                Allergies   Allergen Reactions     Ranitidine Unknown           Noemi Teixeira LPN

## 2022-12-07 NOTE — LETTER
12/7/2022         RE: Jefry Hatfield  [address]  [place] [address]        Dear Colleague,    Thank you for referring your patient, Jefry Hatfield, to the [facility] [place]. Please see a copy of my visit note below.    Date of Service: 12/7/2022    Chief Complaint   Patient presents with     Consult     Pes planus. Orthotics. Referred by: Dr. Butt          HPI: Jefry is a 72 year old male who presents today for further evaluation of pain in both feet. He relates that he is a keen runner. He just completed the [facility] marathon. He relate that he has been wearing orthotics for years. Relates that he does get aching in both feet. He has orthotics that are about 35 years old.     Review of Systems: Answers for HPI/ROS submitted by the patient on 12/7/2022  General Symptoms: No  Skin Symptoms: No  HENT Symptoms: No  EYE SYMPTOMS: No  HEART SYMPTOMS: No  LUNG SYMPTOMS: No  INTESTINAL SYMPTOMS: No  URINARY SYMPTOMS: No  REPRODUCTIVE SYMPTOMS: No  SKELETAL SYMPTOMS: No  BLOOD SYMPTOMS: No  NERVOUS SYSTEM SYMPTOMS: No  MENTAL HEALTH SYMPTOMS: No        PMH:   Past Medical History:   Diagnosis Date     Anisometropia      Cataract, right eye      Cervical radiculopathy      High myopia      Myopic degeneration      Posterior staphyloma     LE     Pseudophakia of left eye      Retinal detachment RE 1971,[id number]    BE       PSxH:   Past Surgical History:   Procedure Laterality Date     APPENDECTOMY       CATARACT IOL, RT/LT Bilateral 11/14/12LE 7/1/14RE    BE     COLONOSCOPY N/A 5/25/2018    Procedure: COLONOSCOPY;  Screening Colonoscopy;  Surgeon: Zak Kaplan MD;  Location:  OR     PHACOEMULSIFICATION CLEAR CORNEA WITH STANDARD INTRAOCULAR LENS IMPLANT  7/1/2014    Procedure: PHACOEMULSIFICATION CLEAR CORNEA WITH STANDARD INTRAOCULAR LENS IMPLANT;  Surgeon: Milan Bernardo MD;  Location:  [facility]     SCLERAL BUCKLE  1971    RE     SCLERAL BUCKLE  1980     LE     YAG CAPSULOTOMY OD (RIGHT EYE)  10/30/2014       Allergies: Ranitidine    SH:   Social History     Socioeconomic History     Marital status:      Spouse name: Not on file     Number of children: Not on file     Years of education: Not on file     Highest education level: Not on file   Occupational History     Occupation:       Employer: [employer]   Tobacco Use     Smoking status: Never     Smokeless tobacco: Never   Substance and Sexual Activity     Alcohol use: Yes     Comment: occaisonal      Drug use: No     Sexual activity: Yes     Partners: Female   Other Topics Concern     Parent/sibling w/ CABG, MI or angioplasty before 65F 55M? Not Asked   Social History Narrative    , works at [employer]     Social Determinants of Health     Financial Resource Strain: Not on file   Food Insecurity: Not on file   Transportation Needs: Not on file   Physical Activity: Not on file   Stress: Not on file   Social Connections: Not on file   Intimate Partner Violence: Not on file   Housing Stability: Not on file       FH:   Family History   Problem Relation Age of Onset     Dementia Mother      Heart Failure Father      Asthma Father      Diabetes Paternal Grandfather      Cancer No family hx of      Glaucoma No family hx of      Macular Degeneration No family hx of      Skin Cancer No family hx of      Melanoma No family hx of        Objective:      PT and DP pulses are 2/4 bilaterally. CRT is instant. Positive pedal hair.   Gross sensation is intact bilaterally.   Equinus is noted bilaterally. No pain with active or passive ROM of the ankle, MTJ, 1st ray, or halluces bilaterally,. Pes panus noted BL with flexor stabilizing hammertoes.   Nails normal bilaterally. No open lesions are noted.     Assessment:   Encounter Diagnoses   Name Primary?     Hammer toes of both feet Yes     Pes planus of both feet          Plan:  - Pt seen and evaluated.  - Orthotics were molded and  sent to the lab.  - Activity as tolerated.  - See again PRN.              Yohannes Oswald DPM

## 2022-12-13 ENCOUNTER — VIRTUAL VISIT (OUTPATIENT)
Dept: DERMATOLOGY | Facility: CLINIC | Age: 72
End: 2022-12-13
Payer: COMMERCIAL

## 2022-12-13 DIAGNOSIS — L30.9 DERMATITIS: Primary | ICD-10-CM

## 2022-12-13 PROCEDURE — 99214 OFFICE O/P EST MOD 30 MIN: CPT | Mod: TEL | Performed by: DERMATOLOGY

## 2022-12-13 RX ORDER — DESONIDE 0.5 MG/G
OINTMENT TOPICAL
Qty: 60 G | Refills: 5 | Status: SHIPPED | OUTPATIENT
Start: 2022-12-13 | End: 2023-01-23

## 2022-12-13 ASSESSMENT — PAIN SCALES - GENERAL: PAINLEVEL: NO PAIN (0)

## 2022-12-13 NOTE — NURSING NOTE
Chief Complaint   Patient presents with     Derm Problem     Pt reports rash or skin abrasion on gluteal cleft/perianal area     Naveed Moreno, EMT

## 2022-12-13 NOTE — LETTER
12/13/2022       RE: Jefry Hatfield  [address]  [place] [address]     Dear Colleague,    Thank you for referring your patient, Jefry Hatfield, to the [facility] [place] at [facility]. Please see a copy of my visit note below.    [facility] Dermatology Note  Encounter Date: Dec 13, 2022  Telephone ([phone]). Location of teledermatologist: [facility] [place]. Start time: 2:54 PM. End time: 3:04 PM.    Dermatology Problem List:  1. 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 milial cyst, shaft of the penis, s/p I&D 1/11/19   3. Poorly demarcated erythematous patch involving gluteal cleft   -s/p skin culture 7/11/19, triamcinolone cream, ketoconazole cream, mupirocin  -s/p punch biopsy 9/12/19, c/w lichen simplex chronicus  -current Rx: tacrolimus BID PRN   ____________________________________________    Assessment & Plan:     # Irritant dermatitis, gluteal cleft. Chronic, flare.   - Start zinc oxide 20% paste daily as preventative therapy  - Start desonide 0.05% ointment BID x 2 weeks PRN. Risk of steroid atrophy with prolonged use discussed. Do not use if no active rash. Recommended [employer] coupon ($26) if too expensive.   - Patient would prefer to follow-up PRN. Will Mychart if flares.     Procedures Performed:    None    Follow-up: prn for new or changing lesions    Staff and Scribe:      Scribe Disclosure:  I, KAYLA MCDONALD, am serving as a scribe to document services personally performed by Jesus Garcia MD based on data collection and the provider's statements to me.     Provider Disclosure:   The documentation recorded by the scribe accurately reflects the services I personally performed and the decisions made by me.    Jesus Garcia MD    Department of Dermatology  [facility]  [facility]: Phone: [phone], Fax:[phone]  [facility]: Phone: [phone] Fax: [phone]  ____________________________________________    CC: Derm Problem (Pt reports rash or skin abrasion on gluteal cleft/perianal area)    HPI:  Mr. Jefry Hatfield is a(n) 72 year old male who presents today as a return patient for a rash follow up. Last seen by Dr. Bell on 7/22/22, at which time the patient was restarted on tacrolimus ointment for treatment of irritant dermatitis on the buttocks.     Today, he repots mild flare of irritant dermatitis on his buttocks. Recently had been training for the [place] marathon and he did associate with flare with his running schedule and chronic rubbing. He has been using an over-the-counter moisturizer (Skin Gloss) which has been helpful. He states the protopic 0.1% ointment was too expensive and he never started. Would like to discuss a cheaper alternative.     Patient is otherwise feeling well, without additional skin concerns.    Labs Reviewed:  N/A    Physical Exam:  Vitals: There were no vitals taken for this visit.  SKIN: Teledermatology photos were reviewed; image quality and interpretability: acceptable. Image date: 12/13/22.  - Mildly lichenified scaly thin plaques on buttock cheeks near gluteal cleft.   - No other lesions of concern on areas examined.     Medications:  Current Outpatient Medications   Medication     CALCIUM-MAG-VIT C-VIT D PO     nortriptyline (PAMELOR) 25 MG capsule     Omega-3 Fatty Acids (OMEGA-3 FISH OIL PO)     Saw Palmetto, Serenoa repens, (SAW PALMETTO EXTRACT PO)     VITAMIN D, CHOLECALCIFEROL, PO     gabapentin (NEURONTIN) 100 MG capsule     No current facility-administered medications for this visit.      Past Medical/Surgical History:   Patient Active Problem List   Diagnosis     Vision changes     Senile nuclear sclerosis     Malignant myopia     Pseudophakia of both eyes - Both  Eyes     Myopic degeneration     Anisometropia     Xerostomia     Adenomatous polyp of colon     Past Medical History:   Diagnosis Date     Anisometropia      Cataract, right eye      Cervical radiculopathy      High myopia      Myopic degeneration      Posterior staphyloma     LE     Pseudophakia of left eye      Retinal detachment RE 1971,[id number]    BE

## 2022-12-13 NOTE — PROGRESS NOTES
[facility] Dermatology Note  Encounter Date: Dec 13, 2022  Telephone ([phone]). Location of teledermatologist: [facility] [place]. Start time: 2:54 PM. End time: 3:04 PM.    Dermatology Problem List:  1. 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 milial cyst, shaft of the penis, s/p I&D 1/11/19   3. Poorly demarcated erythematous patch involving gluteal cleft   -s/p skin culture 7/11/19, triamcinolone cream, ketoconazole cream, mupirocin  -s/p punch biopsy 9/12/19, c/w lichen simplex chronicus  -current Rx: tacrolimus BID PRN   ____________________________________________    Assessment & Plan:     # Irritant dermatitis, gluteal cleft. Chronic, flare.   - Start zinc oxide 20% paste daily as preventative therapy  - Start desonide 0.05% ointment BID x 2 weeks PRN. Risk of steroid atrophy with prolonged use discussed. Do not use if no active rash. Recommended [employer] coupon ($26) if too expensive.   - Patient would prefer to follow-up PRN. Will Mychart if flares.     Procedures Performed:    None    Follow-up: prn for new or changing lesions    Staff and Scribe:      Scribe Disclosure:  I, KAYLA MCDONALD, am serving as a scribe to document services personally performed by Jesus Garcia MD based on data collection and the provider's statements to me.     Provider Disclosure:   The documentation recorded by the scribe accurately reflects the services I personally performed and the decisions made by me.    Jesus Garcia MD    Department of Dermatology  [facility]: Phone: [phone], Fax:[phone]  [facility]: Phone: [phone] Fax: [phone]  ____________________________________________    CC: Derm Problem (Pt reports rash or skin abrasion on gluteal cleft/perianal area)    HPI:  Mr. Jefry Hatfield is a(n) 72  year old male who presents today as a return patient for a rash follow up. Last seen by Dr. Bell on 7/22/22, at which time the patient was restarted on tacrolimus ointment for treatment of irritant dermatitis on the buttocks.     Today, he repots mild flare of irritant dermatitis on his buttocks. Recently had been training for the [place] marathon and he did associate with flare with his running schedule and chronic rubbing. He has been using an over-the-counter moisturizer (Skin Gloss) which has been helpful. He states the protopic 0.1% ointment was too expensive and he never started. Would like to discuss a cheaper alternative.     Patient is otherwise feeling well, without additional skin concerns.    Labs Reviewed:  N/A    Physical Exam:  Vitals: There were no vitals taken for this visit.  SKIN: Teledermatology photos were reviewed; image quality and interpretability: acceptable. Image date: 12/13/22.  - Mildly lichenified scaly thin plaques on buttock cheeks near gluteal cleft.   - No other lesions of concern on areas examined.     Medications:  Current Outpatient Medications   Medication     CALCIUM-MAG-VIT C-VIT D PO     nortriptyline (PAMELOR) 25 MG capsule     Omega-3 Fatty Acids (OMEGA-3 FISH OIL PO)     Saw Palmetto, Serenoa repens, (SAW PALMETTO EXTRACT PO)     VITAMIN D, CHOLECALCIFEROL, PO     gabapentin (NEURONTIN) 100 MG capsule     No current facility-administered medications for this visit.      Past Medical/Surgical History:   Patient Active Problem List   Diagnosis     Vision changes     Senile nuclear sclerosis     Malignant myopia     Pseudophakia of both eyes - Both Eyes     Myopic degeneration     Anisometropia     Xerostomia     Adenomatous polyp of colon     Past Medical History:   Diagnosis Date     Anisometropia      Cataract, right eye      Cervical radiculopathy      High myopia      Myopic degeneration      Posterior staphyloma     LE     Pseudophakia of left eye      Retinal detachment  RE 1971,[id number]    BE

## 2022-12-18 ENCOUNTER — MYC MEDICAL ADVICE (OUTPATIENT)
Dept: DERMATOLOGY | Facility: CLINIC | Age: 72
End: 2022-12-18

## 2022-12-18 DIAGNOSIS — L28.0 LICHEN SIMPLEX CHRONICUS: Primary | ICD-10-CM

## 2022-12-23 ENCOUNTER — PATIENT OUTREACH (OUTPATIENT)
Dept: DERMATOLOGY | Facility: CLINIC | Age: 72
End: 2022-12-23

## 2022-12-23 NOTE — TELEPHONE ENCOUNTER
Attempted to reach patient to schedule follow up in the Dermatology Clinic.  No answer,  LM on VM to call office and [email or web address] message sent..    Schedule with Dr. Jesus Garcia 3/2023.

## 2022-12-27 RX ORDER — TACROLIMUS 1 MG/G
OINTMENT TOPICAL 2 TIMES DAILY
Qty: 60 G | Refills: 2 | Status: SHIPPED | OUTPATIENT
Start: 2022-12-27 | End: 2024-03-12

## 2022-12-27 NOTE — TELEPHONE ENCOUNTER
"A&P from 7/22/2022 office visit with Dr. Bell.  \"   Impression/Plan:  1. Irritant dermatitis with lichenification: on buttocks, worsening since prior. Suspect due irritation/friction from long runs (16 mi/week). We discussed restarting tacrolimus, which has previously been effective, as well as using body glide as lubricant while running, which may also be effective. Not currently consistent with infection at this time.  - restart tacrolimus ointment  - OTC body glide with runs        Follow-up in 3 months\"  "

## 2023-01-02 ENCOUNTER — OFFICE VISIT (OUTPATIENT)
Dept: OPTOMETRY | Facility: CLINIC | Age: 73
End: 2023-01-02
Payer: COMMERCIAL

## 2023-01-02 DIAGNOSIS — H52.31 ANISOMETROPIA AND ANISEIKONIA: Primary | ICD-10-CM

## 2023-01-02 DIAGNOSIS — H54.3 LOW VISION, BOTH EYES: ICD-10-CM

## 2023-01-02 DIAGNOSIS — H44.23 DEGENERATIVE MYOPIA OF BOTH EYES, UNSPECIFIED WHETHER COMPLICATION PRESENT: ICD-10-CM

## 2023-01-02 DIAGNOSIS — H52.32 ANISOMETROPIA AND ANISEIKONIA: Primary | ICD-10-CM

## 2023-01-02 ASSESSMENT — REFRACTION_MANIFEST
OS_AXIS: 062
OD_CYLINDER: +0.50
METHOD_AUTOREFRACTION: 1
OD_AXIS: 117
OS_CYLINDER: +1.50
OS_SPHERE: -2.25
OD_SPHERE: -12.00

## 2023-01-02 ASSESSMENT — REFRACTION_CURRENTRX
OS_BRAND: AV OASYS 1 DAY ASTIG
OS_DIAMETER: 14.1
OS_DIAMETER: 14.3
OD_DIAMETER: 14.1
OS_SPHERE: -1.00
OS_CYLINDER: -1.25
OS_AXIS: 160
OD_BRAND: DAILIES TOTAL 1
OS_BASECURVE: 8.5
OS_BASECURVE: 8.5
OS_SPHERE: -1.50
OS_BRAND: DAILIES TOTAL 1
OD_BASECURVE: 8.5

## 2023-01-02 ASSESSMENT — VISUAL ACUITY
OD_CC: 20/150
OS_CC: 20/150
METHOD: SNELLEN - LINEAR
CORRECTION_TYPE: CONTACTS

## 2023-01-02 ASSESSMENT — REFRACTION_WEARINGRX
OS_AXIS: 070
OD_SPHERE: -12.00
OS_CYLINDER: +1.00
OD_CYLINDER: SPHERE
OS_SPHERE: -2.50

## 2023-01-02 ASSESSMENT — EXTERNAL EXAM - RIGHT EYE: OD_EXAM: ET

## 2023-01-02 ASSESSMENT — SLIT LAMP EXAM - LIDS
COMMENTS: LOOSE LIDS
COMMENTS: LOOSE LIDS

## 2023-01-02 ASSESSMENT — TONOMETRY
IOP_METHOD: ICARE
OD_IOP_MMHG: 17
OS_IOP_MMHG: 13

## 2023-01-02 ASSESSMENT — EXTERNAL EXAM - LEFT EYE: OS_EXAM: NORMAL

## 2023-01-02 NOTE — PROGRESS NOTES
A/P  1.) Degenerative Myopia/Anisometropia each eye s/p CE/IOL  -Overall doing well in soft CL's, using as needed. Often goes uncorrected at home due to low refractive error left eye  -Minimally wearing glasses these days, tolerates aniso okay in glasses  -Alternates -11.00/-12.00 right eye depending on activities. Would favor -11.00 based on today's numbers  -Left eye with consistent cyl on overrefraction today. Would rec trying in soft lens  -Topos largely regular. Would not anticipate significant advantage from RGP's    Continue daily disposable soft lens prn. Due for retinal exam with Dr. Chang

## 2023-01-04 ENCOUNTER — VIRTUAL VISIT (OUTPATIENT)
Dept: INTERNAL MEDICINE | Facility: CLINIC | Age: 73
End: 2023-01-04
Payer: COMMERCIAL

## 2023-01-04 ENCOUNTER — MYC MEDICAL ADVICE (OUTPATIENT)
Dept: INTERNAL MEDICINE | Facility: CLINIC | Age: 73
End: 2023-01-04

## 2023-01-04 DIAGNOSIS — E78.00 ELEVATED LDL CHOLESTEROL LEVEL: Primary | ICD-10-CM

## 2023-01-04 PROCEDURE — 99212 OFFICE O/P EST SF 10 MIN: CPT | Mod: TEL | Performed by: INTERNAL MEDICINE

## 2023-01-04 NOTE — PROGRESS NOTES
Jefry is a 72 year old who is being evaluated via a billable telephone visit.      Pt is in [place]    What phone number would you like to be contacted at? [phone]  How would you like to obtain your AVS? Moses CLEMENTE      Distant Location (provider location):    Phone call duration: 12 minutes    Telephone visit     Mr. Hatfield agrees to a telephone visit     Overall he is doing quite well. He continues to run sometimes up to 6 miles w/o problems.     Last in-person visit with me 10/21/2022    Vitamin D level normal at 44 on 10/21/2022    Slightly elevated Alkphos 153 on 10/21/2022    PSA 1.53 on 10/21/2022    LDL cholesterol of 131. Lipids 10/21/2022; , HDL 59 and TG's 53. We discussed his cardiac risk factors and quite low (no smoking, no DM, no HTN, no vascular disease, HDL 59 for a male and he exercises). Reviewed past LDL cholesterol labs 10/22/2007; LDL 98 and HDL 44). Historically low, low/normal LDL lipids. For now he will continue to exercise and will hold on statin medication.     Seen Dermatology Dr. Garcia 12/13/2022 for skin check     Seen Podiatry, Dr. Oswald 12/7/2022 for orthotics on 12/7/2022    Seen Dr. Butt, Orthopedics 11/14/2022 and 10/21/2022 for L knee pain

## 2023-01-04 NOTE — PATIENT INSTRUCTIONS
Thank you for visiting the Primary Care Center today at the [facility]! The following is some information about our clinic:     Primary Care Center Frequently-Asked Questions    (1) How do I schedule appointments at the [facility]?     Primary Care--to schedule or make changes to an existing appointment, please call our primary care line at [phone].    Labs--to schedule a lab appointment at the [facility] you can use [employer] or call [phone]. If you have a [place] location that is closer to home, you can reach out to that location for scheduling options.     Imaging--if you need to schedule a CT, X-ray, MRI, ultrasound, or other imaging study you can call [phone] to schedule at the [facility] or any other [facility] imaging location.     Referrals--if a referral to another specialty was ordered you can expect a phone call from their scheduling team. If you have not heard from them in a week, please call us or send us a [employer] message to check the status or get a scheduling number. Please note that this only applies to internal [facility] referrals. If the referral is external you would need to contact their office for scheduling.     (2) I have a question about my visit, who do I contact?     You can call us at the primary care line at [phone] to ask questions about your visit. You can also send a secure message through [employer], which is reviewed by clinic staff. Please note that [employer] messages have a twenty-four to forty-eight business hour turnaround time and should not be used for urgent concerns.    (3) How will I get the results of my tests?    If you are signed up for [employer] all tests will be released to you within twenty-four hours of resulting. Please allow three to five days for your doctor to review your results and place a note interpreting the results. If you do not have [employer] you will receive your  results through mail seven to ten business days following the return of the tests. Please note that if there should be any urgent or concerning results that your doctor or their registered nurse will reach out to you the same day as the tests come back. If you have follow up questions about your results or would like to discuss the results in detail please schedule a follow up with your provider either in person or virtually.     (4) How do I get refills of my prescriptions?     You should always first contact your pharmacy for refills of your medications. If submitting a refill request on [employer], please be sure to submit the request only once--repeat requests can cause delays in refill. If you are requesting a NEW medication or a medication related to new symptoms you will need to schedule an appointment with a provider prior to approval. Please note: Routine medication refills have up to one to three business day turnaround whereas controlled substances refills have up to five to seven business day turnaround.    (5) I have new symptoms, what do I do?     If you are having an immediate medical emergency, you should dial 911 for assistance.   For anything urgent that needs to be seen within a few hours to one day you should visit a local urgent care for assistance.  For non-urgent symptoms that need to be seen within a few days to a week you can schedule with an available provider in primary care by going to [employer] or calling [phone].   If you are not sure how serious your symptoms are or you would like to receive medical advice you can always call [phone] to speak with a triage nurse.

## 2023-01-23 ENCOUNTER — MYC MEDICAL ADVICE (OUTPATIENT)
Dept: DERMATOLOGY | Facility: CLINIC | Age: 73
End: 2023-01-23
Payer: COMMERCIAL

## 2023-01-23 DIAGNOSIS — L30.9 DERMATITIS: ICD-10-CM

## 2023-01-23 RX ORDER — DESONIDE 0.5 MG/G
OINTMENT TOPICAL
Qty: 60 G | Refills: 5 | Status: SHIPPED | OUTPATIENT
Start: 2023-01-23 | End: 2023-07-03

## 2023-03-06 ENCOUNTER — MYC MEDICAL ADVICE (OUTPATIENT)
Dept: INTERNAL MEDICINE | Facility: CLINIC | Age: 73
End: 2023-03-06
Payer: COMMERCIAL

## 2023-03-24 ENCOUNTER — LAB (OUTPATIENT)
Dept: LAB | Facility: CLINIC | Age: 73
End: 2023-03-24
Payer: COMMERCIAL

## 2023-03-24 ENCOUNTER — MYC MEDICAL ADVICE (OUTPATIENT)
Dept: INTERNAL MEDICINE | Facility: CLINIC | Age: 73
End: 2023-03-24

## 2023-03-24 ENCOUNTER — OFFICE VISIT (OUTPATIENT)
Dept: INTERNAL MEDICINE | Facility: CLINIC | Age: 73
End: 2023-03-24
Payer: COMMERCIAL

## 2023-03-24 VITALS
DIASTOLIC BLOOD PRESSURE: 82 MMHG | WEIGHT: 169.8 LBS | HEIGHT: 72 IN | BODY MASS INDEX: 23 KG/M2 | HEART RATE: 64 BPM | SYSTOLIC BLOOD PRESSURE: 124 MMHG | OXYGEN SATURATION: 100 %

## 2023-03-24 DIAGNOSIS — R39.11 URINARY HESITANCY: ICD-10-CM

## 2023-03-24 DIAGNOSIS — Z13.9 SCREENING FOR CONDITION: Primary | ICD-10-CM

## 2023-03-24 DIAGNOSIS — N52.9 ERECTILE DYSFUNCTION, UNSPECIFIED ERECTILE DYSFUNCTION TYPE: ICD-10-CM

## 2023-03-24 LAB
ALBUMIN UR-MCNC: NEGATIVE MG/DL
APPEARANCE UR: CLEAR
BILIRUB UR QL STRIP: NEGATIVE
COLOR UR AUTO: YELLOW
GLUCOSE UR STRIP-MCNC: NEGATIVE MG/DL
HGB UR QL STRIP: NEGATIVE
KETONES UR STRIP-MCNC: NEGATIVE MG/DL
LEUKOCYTE ESTERASE UR QL STRIP: NEGATIVE
MUCOUS THREADS #/AREA URNS LPF: PRESENT /LPF
NITRATE UR QL: NEGATIVE
PH UR STRIP: 6.5 [PH] (ref 5–7)
RBC URINE: 1 /HPF
SP GR UR STRIP: 1.02 (ref 1–1.03)
SQUAMOUS EPITHELIAL: <1 /HPF
UROBILINOGEN UR STRIP-MCNC: NORMAL MG/DL
WBC URINE: 0 /HPF

## 2023-03-24 PROCEDURE — 84403 ASSAY OF TOTAL TESTOSTERONE: CPT | Performed by: INTERNAL MEDICINE

## 2023-03-24 PROCEDURE — 99215 OFFICE O/P EST HI 40 MIN: CPT | Performed by: INTERNAL MEDICINE

## 2023-03-24 PROCEDURE — 36415 COLL VENOUS BLD VENIPUNCTURE: CPT | Performed by: PATHOLOGY

## 2023-03-24 PROCEDURE — 81001 URINALYSIS AUTO W/SCOPE: CPT | Performed by: PATHOLOGY

## 2023-03-24 NOTE — PROGRESS NOTES
"  PRIMARY CARE    Patient Name: Jefry Hatfield  YOB: 1950  MRN: [id number]    Date of Service: March 24, 2023    Chief Complaint   Patient presents with     Urinary Problem     Follow Up     Follow up to discuss urological concerns            HISTORY OF PRESENT ILLNESS:    Patient presents today for primary care follow up, telephone encounter  01/2022 with previous office visit last 10/2022. Would like to discuss erectile dysfunction and also reports some LUTS today. See A/P below.     Medications and allergies reviewed by me today.          PAST MEDICAL HISTORY:     Past Medical History:   Diagnosis Date     Anisometropia      Cataract, right eye      Cervical radiculopathy      High myopia      Myopic degeneration      Posterior staphyloma     LE     Pseudophakia of left eye      Retinal detachment RE 1971,[id number]    BE            REVIEW OF SYSTEMS:     10 point ROS was negative except as noted in HPI         HOME MEDICATIONS:     Current Outpatient Medications   Medication     CALCIUM-MAG-VIT C-VIT D PO     desonide (DESOWEN) 0.05 % external ointment     nortriptyline (PAMELOR) 25 MG capsule     Omega-3 Fatty Acids (OMEGA-3 FISH OIL PO)     Saw Palmetto, Serenoa repens, (SAW PALMETTO EXTRACT PO)     tacrolimus (PROTOPIC) 0.1 % external ointment     VITAMIN D, CHOLECALCIFEROL, PO     gabapentin (NEURONTIN) 100 MG capsule     No current facility-administered medications for this visit.            PHYSICAL EXAM:   Vitals: /82 (BP Location: Right arm, Patient Position: Sitting, Cuff Size: Adult Regular)   Pulse 64   Ht 1.829 m (6' 0.01\")   Wt 77 kg (169 lb 12.8 oz)   SpO2 100%   BMI 23.02 kg/m       Wt Readings from Last 4 Encounters:   03/24/23 77 kg (169 lb 12.8 oz)   11/14/22 77.1 kg (170 lb)   10/21/22 77.2 kg (170 lb 3.2 oz)   06/28/22 75.3 kg (166 lb)       GENERAL: Alert, interactive, NAD  HEENT: NCAT. no conjunctivitis, anicteric sclera  LUNGS: clear to auscultation " bilaterally, no crackles or wheezes  HEART: regular rate and rhythm, normal S1 and S2, no murmur appreciated  EXTREMITIES: No LE edema bilaterally  NEURO: A&O, moving all 4 limbs spontaneously   PSYCH: Appropriate mood, normal speech, linear thought.               ASSESSMENT & PLAN:     Erectile dysfunction: Pt reports symptoms starting about ~4 months ago, had been training for the [place] marathon and considering whether new ED is a result of the training. Has been attempting pelvic floor muscle strengthening exercises but is not confident he is doing them correctly. Pt also reports LUTS symptoms of urine stream hesitancy, occasional need to strain to void, and intermittent urgency/frequency. Denies hematuria, abd or flank pain, burning with urination, or recent fevers. Defer start of PDE5 inhibitor at this time. Labs and PT for pelvic exercises ordered at today visit:     -     Testosterone, total; Future  -     UA with Microscopic reflex to Culture - lab collect; Future  -     Physical Therapy Referral; Future    Iliotibial band syndrome, bilateral/chronic L knee pain: Saw Dr. Corrigan, sports medicine 06/2022 with recommendation for PT.     Pes Plantus: saw podiatry, Dr. Oswald per referral from family medicine. Fitted/molded for orthotics at that visit. Recommendation to f/u PRN    Myopic degeneration/posterior staphylomas: Followed by Dr. Chang saw 09/21, due for f/u.     Followed by Dr. Garcia, dermatology seen 12/22 with f/u recommendation PRN. Received steroid ointment for irritant dermatitis to gluteal cleft (2/2 marathon training).     #Healthcare Maintenance:   [facility] Website verified for patient immunization history.  Immunization status: up to date and documented.    PSA screening:  Prostate Specific Antigen Screen   Date Value Ref Range Status   10/21/2022 1.53 0.00 - 4.50 ng/mL Final     Colonoscopy: 05/2018, due in 5 years. Order placed today.     Return to clinic: 6 weeks    Patient care plan  discussed with attending physician, Dr. See, who agreed with above.    Suzette Anderson NP Student     Staff note; I personally reviewed Mr. Hatfield' past medical history. I interviewed him and conducted a physical examination. I agree with the above assessment and plan.    ESCOBAR See MD      40 minutes spent on the date of the encounter doing chart review, history and exam, documentation and further activities as noted above

## 2023-03-26 ENCOUNTER — MYC MEDICAL ADVICE (OUTPATIENT)
Dept: OPHTHALMOLOGY | Facility: CLINIC | Age: 73
End: 2023-03-26

## 2023-03-28 LAB — TESTOST SERPL-MCNC: 598 NG/DL (ref 240–950)

## 2023-03-30 ENCOUNTER — THERAPY VISIT (OUTPATIENT)
Dept: PHYSICAL THERAPY | Facility: CLINIC | Age: 73
End: 2023-03-30
Payer: COMMERCIAL

## 2023-03-30 DIAGNOSIS — N39.41 URGE INCONTINENCE OF URINE: ICD-10-CM

## 2023-03-30 DIAGNOSIS — R39.11 URINARY HESITANCY: ICD-10-CM

## 2023-03-30 DIAGNOSIS — N52.9 ED (ERECTILE DYSFUNCTION): ICD-10-CM

## 2023-03-30 PROCEDURE — 97530 THERAPEUTIC ACTIVITIES: CPT | Mod: GP | Performed by: PHYSICAL THERAPIST

## 2023-03-30 PROCEDURE — 97110 THERAPEUTIC EXERCISES: CPT | Mod: GP | Performed by: PHYSICAL THERAPIST

## 2023-03-30 PROCEDURE — 97161 PT EVAL LOW COMPLEX 20 MIN: CPT | Mod: GP | Performed by: PHYSICAL THERAPIST

## 2023-03-30 NOTE — PROGRESS NOTES
Physical Therapy Initial Evaluation  Subjective:  The history is provided by the patient. No  was used.   Patient Health History  Jefry Hatfield being seen for possible pelvic floor weakness.     Problem began: 12/6/2022.   Problem occurred: Not sure, I was training for a marathon that I ran in  november, 2020 and sexual function has been an issue, while it seems to be improving, I decided to try physical therapy as opposed to medicaton     General health as reported by patient is good.  Pertinent medical history includes: none.     Medical allergies: none.   Surgeries include:  Other. Other surgery history details: apendectomy, detached tretina, cataract surgery.    Current medications:  None.    Current occupation is retired.   Primary job tasks include:  Computer work.                  SUBJECTIVE:  Patient reports onset of symptoms last year when he started training for a marathon.  Had some issues with his knee and single leg strength that he worked on with physical therapy.  Started to notice erectile dysfunction in the fall and into December, was wondering if it was due to weakness and running up to 18 miles in one time.  He started doing kegel exercises, but unsure of if they are working.  Has a few episodes of not being able to achieve penetration with his wife.  Difficulty with maintaining erection, 75% of previous erection.  Normal orgasm.  He also has some urinary issues, indicated that urinary stream is slow, these have been ongoing for about a year.  Prostate and urine/blood tests negative.  Knows that he doesn't drink as much as he should.  Having some urinary leakage with urge, usually only happens he waits.  Happens about once per day, biggest trigger is washing dishes.  Feels like he empties his bladder better after doing kegels.  Doing about 100 kegels per day.  Has occassional sphincter muscle spasm or in the legs, apple cider vinegar seems to help.  Since onset  symptoms have been getting better, worse or staying the same? Getting slightly better    Current activity: swim 2x/week, run 1x/week, theraband work, walking  Urination:  Do you leak on the way to the bathroom or with a strong urge to void? Yes    Do you leak with cough,sneeze, jumping, running?No   Any other activities that cause leaking? No    Do you have triggers that make you feel you can't wait to go to the bathroom? Yes - washing dishes  Type of pad and number used per day?   When you leak what is the amount? small    How long can you delay the need to urinate? varies.   How many times do you get up to urinate at night? 0-1    Can you stop the flow of urine when on the toilet? Yes  Is the volume of urine passed usually: average. (8sec rule=  250ml with average bladder storing  400-600ml)    Do you strain to pass urine? sometimes  Do you have a slow or hesitant urinary stream? Yes  Do you have difficulty initiating the urine stream? Yes    Fluid intake(one glass is 8oz or one cup) 5glasses/day, 0caffinated glasses/day  0 alcohol glasses/day.    Bowel habits:  Frequency of bowel movements?7 times a week  Consistancy of stool? soft formed, Austin Stool Scale type 4   Do you ignore the urge to defecate? No  Do you strain to pass stool? No  Is taking psyllium husk, magnesium, and prunes    Pelvic Pain:  Do you have pain with erections? No  Do you have pain with ejaculation? No  Do you have pain with sitting?  No  Any other activities that produce the pelvic pain  Have trouble with firm erection, on one occasion could not penetrate     Are you sexually active?Yes  Have you ever been worried for your physical safety? No  Any abdominal or pelvic surgeries? Appendectomy when he was 23 or 24   Are you having any regular exercise?yes  Have you practiced the PF(kegel) exercises for 4 or more weeks?inconsistently   Changed diet lately?no                Objective:  System         Lumbar/SI Evaluation  ROM:  AROM Lumbar:  normal      Lumbar Myotomes:  normal                                                    Pelvic Dysfunction Evaluation:        Flexibility:    Tightness present at:Iliopsoas; Hamstrings and Gluteals    Abdominal Wall:  normal        Pelvic Clock Exam:    Ischiocavernosis pain:  -  Bulbocavernosis pain:  +  Transverse Perineal:  +          External Assessment:  External assessment pelvic: elevated perineum in resting.  Gluteal compensation.  good ascent with cues for kegel, slow relaxation.  Skin Condition:  Normal      Tissue Symmetry:  Normal    Muscle Contraction/Perineal Mobility:  Slight lift, no urogential triangle descent              Hip Evaluation  Hip PROM:  Hip PROM:  Left Hip:    Normal  Right Hip:  Normal                          Hip Strength:    Flexion:   Left: 5-/5   Pain:  Right: 5-/5   Pain:                      Abduction:  Left: 4/5     Pain:Right: 4/5    Pain:                                 General     ROS    Assessment/Plan:    Patient is a 72 year old male with pelvic complaints.    Patient has the following significant findings with corresponding treatment plan.                Diagnosis 1:  Erectile dysfunction, pelvic floor dysfunction  Pain -  manual therapy, self management, education, directional preference exercise and home program  Decreased ROM/flexibility - manual therapy and therapeutic exercise  Decreased joint mobility - manual therapy and therapeutic exercise  Decreased strength - therapeutic exercise and therapeutic activities  Impaired muscle performance - neuro re-education  Decreased function - therapeutic activities    Therapy Evaluation Codes:   1) History comprised of:   Personal factors that impact the plan of care:      None.    Comorbidity factors that impact the plan of care are:      None.     Medications impacting care: None.  2) Examination of Body Systems comprised of:   Body structures and functions that impact the plan of care:      Pelvis.   Activity limitations  that impact the plan of care are:      [place], Urgency, Urge incontinence and Urinary incontinence.  3) Clinical presentation characteristics are:   Stable/Uncomplicated.  4) Decision-Making    Low complexity using standardized patient assessment instrument and/or measureable assessment of functional outcome.  Cumulative Therapy Evaluation is: Low complexity.    Previous and current functional limitations:  (See Goal Flow Sheet for this information)    Short term and Long term goals: (See Goal Flow Sheet for this information)     Communication ability:  Patient appears to be able to clearly communicate and understand verbal and written communication and follow directions correctly.  Treatment Explanation - The following has been discussed with the patient:   RX ordered/plan of care  Anticipated outcomes  Possible risks and side effects  This patient would benefit from PT intervention to resume normal activities.   Rehab potential is excellent.    Frequency:  Every 2  weeks, once daily  Duration:  for 12 weeks  Discharge Plan:  Achieve all LTG.  Independent in home treatment program.  Reach maximal therapeutic benefit.    Please refer to the daily flowsheet for treatment today, total treatment time and time spent performing 1:1 timed codes.

## 2023-04-02 ENCOUNTER — HEALTH MAINTENANCE LETTER (OUTPATIENT)
Age: 73
End: 2023-04-02

## 2023-04-06 PROBLEM — N52.9 ED (ERECTILE DYSFUNCTION): Status: ACTIVE | Noted: 2023-04-06

## 2023-04-06 PROBLEM — N39.41 URGE INCONTINENCE OF URINE: Status: ACTIVE | Noted: 2023-04-06

## 2023-04-06 PROBLEM — R39.11 URINARY HESITANCY: Status: ACTIVE | Noted: 2023-04-06

## 2023-04-06 NOTE — PROGRESS NOTES
[facility]    OUTPATIENT Physical Therapy ORTHOPEDIC EVALUATION  PLAN OF TREATMENT FOR OUTPATIENT REHABILITATION  (COMPLETE FOR INITIAL CLAIMS ONLY)  Patient's Last Name, First Name, M.I.  YOB: 1950  Jefry Hatfield    Provider s Name:  [facility]   Medical Record No.  [id number]   Start of Care Date:  03/30/23   Onset Date:   03/24/23 (provider referral date)   Treatment Diagnosis:  UUI, ED Medical Diagnosis:     Urinary hesitancy  ED (erectile dysfunction)  Urge incontinence of urine       Goals:     03/30/23 0500   Urination Dysfunction   Previous Functional Level Normal   Current Functional Level Hesitation with voiding;Strain with voiding   Performance Level weak urine stream less than half of the time   STG Target Performance Decrease hesitation with voiding   Performance Level <25% of times   Rationale Work toward normal voiding patterns to focus on ADLS, work, or school   Due Date 04/20/23   LTG Target Performance Urinate without hesitation or pain level of   Performance Level 100% of the time, 8 urinations per day with no leakage   Rationale Work toward normal voiding patterns to focus on ADLS, work, or school   Due Date 06/22/23         Therapy Frequency:  every 2 weeks  Predicted Duration of Therapy Intervention:  12 weeks    Michelle Rush, PT                 I CERTIFY THE NEED FOR THESE SERVICES FURNISHED UNDER        THIS PLAN OF TREATMENT AND WHILE UNDER MY CARE     (Physician attestation of this document indicates review and certification of the therapy plan).                     Certification Date From:  03/30/23   Certification Date To:  06/22/23    Referring Provider:  Varinder See    Initial Assessment        See Epic Evaluation SOC Date: 03/30/23

## 2023-04-18 ENCOUNTER — THERAPY VISIT (OUTPATIENT)
Dept: PHYSICAL THERAPY | Facility: CLINIC | Age: 73
End: 2023-04-18
Payer: COMMERCIAL

## 2023-04-18 DIAGNOSIS — R39.11 URINARY HESITANCY: Primary | ICD-10-CM

## 2023-04-18 DIAGNOSIS — N52.9 ED (ERECTILE DYSFUNCTION): ICD-10-CM

## 2023-04-18 DIAGNOSIS — N39.41 URGE INCONTINENCE OF URINE: ICD-10-CM

## 2023-04-18 PROCEDURE — 97530 THERAPEUTIC ACTIVITIES: CPT | Mod: GP | Performed by: PHYSICAL THERAPIST

## 2023-04-18 PROCEDURE — 97110 THERAPEUTIC EXERCISES: CPT | Mod: 59 | Performed by: PHYSICAL THERAPIST

## 2023-04-18 PROCEDURE — 97140 MANUAL THERAPY 1/> REGIONS: CPT | Mod: 59 | Performed by: PHYSICAL THERAPIST

## 2023-05-10 ENCOUNTER — TELEPHONE (OUTPATIENT)
Dept: GASTROENTEROLOGY | Facility: CLINIC | Age: 73
End: 2023-05-10
Payer: COMMERCIAL

## 2023-05-10 NOTE — TELEPHONE ENCOUNTER
Screening Questions  BLUE  KIND OF PREP RED  LOCATION [review exclusion criteria] GREEN  SEDATION TYPE        Y Are you active on mychart?       LIDA GONCALVES Ordering/Referring Provider?        [facility] MEDICARE What type of coverage do you have?      N Have you had a positive covid test in the last 14 days?     22.1 1. BMI  [BMI 40+ - review exclusion criteria& smart-phrase document]    Y  2. Are you able to give consent for your medical care? [IF NO,RN REVIEW]          N  3. Are you taking any prescription pain medications on a routine schedule   (ex narcotics: oxycodone, roxicodone, oxycontin,  and percocet)? [RN Review]          3a. EXTENDED PREP What kind of prescription?     N 4. Do you have any chemical dependencies such as alcohol, street drugs, or methadone?        **If yes 3- 5 , please schedule with MAC sedation.**          IF YES TO ANY 6 - 10 - HOSPITAL SETTING ONLY.     N 6.   Do you need assistance transferring?     N 7.   Have you had a heart or lung transplant?    N 8.   Are you currently on dialysis?   N 9.   Do you use daily home oxygen?   N 10. Do you take nitroglycerin?   10a.  If yes, how often?      11. Are you currently pregnant?    11a.  If yes, how many weeks? [ Greater than 12 weeks, OR NEEDED]    N 12. Do you have Pulmonary Hypertension? *NEED PAC APPT AT [facility] w/ [facility]*     N 13. [review exclusion criteria]  Do you have any implantable devices in your body (pacemaker, defib, LVAD)?    N 14. In the past 6 months, have you had any heart related issues including cardiomyopathy or heart attack?     14a.  If yes, did it require cardiac stenting if so when?     N 15. Have you had a stroke or Transient ischemic attack (TIA - aka  mini stroke ) within 6 months?      N 16. Do you have mod to severe Obstructive Sleep Apnea?  [Hospital only]    N 17. Do you have SEVERE AND UNCONTROLLED asthma? *NEED PAC APPT AT [facility] w/[facility]*     18.Do you take blood thinners?  No    N 19. Do you take the medication  "named Phentermine?    19a. If yes, \"Hold for 7 days before procedure.  Please consult your prescribing provider if you have questions about holding this medication.\"     N  20. Do you have chronic kidney disease?      N  21. Do you have a diagnosis of diabetes?     N  22. On a regular basis do you go 3-5 days between bowel movements?      23. Preferred LOCAL Pharmacy for Pre Prescription        [facility] - [place], [place] - [address] [place] [address]        - CLOSING REMINDERS -    You will receive a call from a Nurse to review instructions and health history.  This assessment must be completed prior to your procedure.  Failure to complete the Nurse assessment may result in the procedure being cancelled.      On the day of your procedure, please designatean adult(s) who can drive you home stay with you for the next 24 hours. The medicines used in the exam will make you sleepy. You will not be able to drive.      You cannot take public transportation, ride share services, or non-medical taxi service without a responsible caregiver.  Medical transport services are allowed with the requirement that a responsible caregiver will receive you at your destination.  We require that drivers and caregivers are confirmed prior to your procedure.      - SCHEDULING DETAILS -  N &  Hospital Setting Required & If yes, what is the exclusion?   SHMIDT  Surgeon    7/3  Date of Procedure  Lower Endoscopy [Colonoscopy]  Type of Procedure Scheduled  [facility]   MIRALAX GATORADE WITHOUT MAGNEISUM CITRATE Which Colonoscopy Prep was Sent?     CS Sedation Type     N PAC / Pre-op Required                 "

## 2023-05-11 ENCOUNTER — THERAPY VISIT (OUTPATIENT)
Dept: PHYSICAL THERAPY | Facility: CLINIC | Age: 73
End: 2023-05-11
Payer: COMMERCIAL

## 2023-05-11 DIAGNOSIS — N52.9 ED (ERECTILE DYSFUNCTION): ICD-10-CM

## 2023-05-11 DIAGNOSIS — R39.11 URINARY HESITANCY: Primary | ICD-10-CM

## 2023-05-11 DIAGNOSIS — N39.41 URGE INCONTINENCE OF URINE: ICD-10-CM

## 2023-05-11 PROCEDURE — 97110 THERAPEUTIC EXERCISES: CPT | Mod: GP | Performed by: PHYSICAL THERAPIST

## 2023-05-11 PROCEDURE — 97140 MANUAL THERAPY 1/> REGIONS: CPT | Mod: GP | Performed by: PHYSICAL THERAPIST

## 2023-05-11 PROCEDURE — 97530 THERAPEUTIC ACTIVITIES: CPT | Mod: GP | Performed by: PHYSICAL THERAPIST

## 2023-05-11 NOTE — PROGRESS NOTES
Virtual Visit Details    Type of service:  Telephone Visit   Phone call duration: 5 minutes       Telephone visit    Mr. Hatfield agrees to a telephone visit.     HPI:    Past Medical History:   Diagnosis Date     Anisometropia      Cataract, right eye      Cervical radiculopathy      High myopia      Myopic degeneration      Posterior staphyloma     LE     Pseudophakia of left eye      Retinal detachment RE 1971,[id number]    BE     Past Surgical History:   Procedure Laterality Date     APPENDECTOMY       CATARACT IOL, RT/LT Bilateral 11/14/12LE 7/1/14RE    BE     COLONOSCOPY N/A 5/25/2018    Procedure: COLONOSCOPY;  Screening Colonoscopy;  Surgeon: Zak Kaplan MD;  [facility]     PHACOEMULSIFICATION CLEAR CORNEA WITH STANDARD INTRAOCULAR LENS IMPLANT  7/1/2014    Procedure: PHACOEMULSIFICATION CLEAR CORNEA WITH STANDARD INTRAOCULAR LENS IMPLANT;  Surgeon: Milan Bernardo MD;  [facility]     SCLERAL BUCKLE  1971    RE     SCLERAL BUCKLE  1980    LE     YAG CAPSULOTOMY OD (RIGHT EYE)  10/30/2014       A/P:     1. Pelvic Health PT today 5/11/2023. Last visit with Ms. Rush 4/6/2023. He states that his urinary symptoms and erectile dysfunction are less getting this pelvic PT. He wants to continue with this before seeing Urology or starting Viagara. I will have another telephone visit with me in the end of July 2. Dermatology appt. With Dr. Garcia scheduled for 12/13/2022  3. Immunizations; He has completed the Shingrix vaccine series Tdap 3/27/2018. Prevnar 13 done 3/27/2018. COVID Moderna vaccine x 5 bi-valent 10/9/2022.   4. Colonoscopy; 5/25/2018 and repeat in 5 years. Colonoscopy scheduled on 7/3/2022.   5. PSA; 1.53 on 10/21/2022.   6. Lipids; 10/21/2022; TG's 53, HDL 59 and    7. B Iliotibal band syndrome, seen 11/14/2022 by Dr. Butt and he is getting PT. He had B knee X-rays 9/12/2022  8. Vitamin D level 44 on 10/21/2022

## 2023-05-12 ENCOUNTER — VIRTUAL VISIT (OUTPATIENT)
Dept: INTERNAL MEDICINE | Facility: CLINIC | Age: 73
End: 2023-05-12
Payer: COMMERCIAL

## 2023-05-12 ENCOUNTER — MYC MEDICAL ADVICE (OUTPATIENT)
Dept: INTERNAL MEDICINE | Facility: CLINIC | Age: 73
End: 2023-05-12

## 2023-05-12 DIAGNOSIS — R10.2 PELVIC PAIN IN MALE: Primary | ICD-10-CM

## 2023-05-12 PROCEDURE — 99212 OFFICE O/P EST SF 10 MIN: CPT | Mod: 93 | Performed by: INTERNAL MEDICINE

## 2023-05-12 ASSESSMENT — PAIN SCALES - GENERAL: PAINLEVEL: NO PAIN (0)

## 2023-05-12 NOTE — NURSING NOTE
Is the patient currently in the state of [place]? YES    Visit mode:TELEPHONE    If the visit is dropped, the patient can be reconnected by: TELEPHONE VISIT: Phone number: [phone]    Will anyone else be joining the visit? NO      How would you like to obtain your AVS? MyChart    Are changes needed to the allergy or medication list? NO    Reason for visit: Telephone (6 week follow up )

## 2023-05-12 NOTE — TELEPHONE ENCOUNTER
Patient had appointment with provider today.     PRAVEEN Sow at 2:39 PM on 5/12/2023.  Primary Care Clinic: [phone]

## 2023-05-12 NOTE — PATIENT INSTRUCTIONS
Thank you for visiting the Primary Care Center today at the [facility]! The following is some information about our clinic:     Primary Care Center Frequently-Asked Questions    (1) How do I schedule appointments at the [facility]?     Primary Care--to schedule or make changes to an existing appointment, please call our primary care line at [phone].    Labs--to schedule a lab appointment at the [facility] you can use [employer] or call [phone]. If you have a [facility] location that is closer to home, you can reach out to that location for scheduling options.     Imaging--if you need to schedule a CT, X-ray, MRI, ultrasound, or other imaging study you can call [phone] to schedule at the [facility] or any other [facility] imaging location.     Referrals--if a referral to another specialty was ordered you can expect a phone call from their scheduling team. If you have not heard from them in a week, please call us or send us a [employer] message to check the status or get a scheduling number. Please note that this only applies to internal [facility] referrals. If the referral is external you would need to contact their office for scheduling.     (2) I have a question about my visit, who do I contact?     You can call us at the primary care line at [phone] to ask questions about your visit. You can also send a secure message through EndoShape, which is reviewed by clinic staff. Please note that [employer] messages have a twenty-four to forty-eight business hour turnaround time and should not be used for urgent concerns.    (3) How will I get the results of my tests?    If you are signed up for [employer] all tests will be released to you within twenty-four hours of resulting. Please allow three to five days for your doctor to review your results and place a note interpreting the results. If you do not have [employer] you will receive your  results through mail seven to ten business days following the return of the tests. Please note that if there should be any urgent or concerning results that your doctor or their registered nurse will reach out to you the same day as the tests come back. If you have follow up questions about your results or would like to discuss the results in detail please schedule a follow up with your provider either in person or virtually.     (4) How do I get refills of my prescriptions?     You should always first contact your pharmacy for refills of your medications. If submitting a refill request on [employer], please be sure to submit the request only once--repeat requests can cause delays in refill. If you are requesting a NEW medication or a medication related to new symptoms you will need to schedule an appointment with a provider prior to approval. Please note: Routine medication refills have up to one to three business day turnaround whereas controlled substances refills have up to five to seven business day turnaround.    (5) I have new symptoms, what do I do?     If you are having an immediate medical emergency, you should dial 911 for assistance.   For anything urgent that needs to be seen within a few hours to one day you should visit a local urgent care for assistance.  For non-urgent symptoms that need to be seen within a few days to a week you can schedule with an available provider in primary care by going to [employer] or calling [phone].   If you are not sure how serious your symptoms are or you would like to receive medical advice you can always call [phone] to speak with a triage nurse.

## 2023-05-25 ENCOUNTER — THERAPY VISIT (OUTPATIENT)
Dept: PHYSICAL THERAPY | Facility: CLINIC | Age: 73
End: 2023-05-25
Attending: INTERNAL MEDICINE
Payer: COMMERCIAL

## 2023-05-25 DIAGNOSIS — R39.11 URINARY HESITANCY: ICD-10-CM

## 2023-05-25 DIAGNOSIS — N52.9 ED (ERECTILE DYSFUNCTION): Primary | ICD-10-CM

## 2023-05-25 DIAGNOSIS — N39.41 URGE INCONTINENCE OF URINE: ICD-10-CM

## 2023-05-25 PROCEDURE — 97530 THERAPEUTIC ACTIVITIES: CPT | Mod: GP | Performed by: PHYSICAL THERAPIST

## 2023-05-25 PROCEDURE — 97110 THERAPEUTIC EXERCISES: CPT | Mod: GP | Performed by: PHYSICAL THERAPIST

## 2023-05-25 PROCEDURE — 97112 NEUROMUSCULAR REEDUCATION: CPT | Mod: GP | Performed by: PHYSICAL THERAPIST

## 2023-06-19 ENCOUNTER — TELEPHONE (OUTPATIENT)
Dept: GASTROENTEROLOGY | Facility: CLINIC | Age: 73
End: 2023-06-19
Payer: COMMERCIAL

## 2023-06-19 NOTE — TELEPHONE ENCOUNTER
Pre assessment questions completed for upcoming Colonoscopy  procedure scheduled on 7/3/23    COVID policy reviewed.     Pre-op exam? N/A    Reviewed procedural arrival time 0930, procedure time 1030 and facility location [facility]; [address] [place] [address], [address] Floor, [place] [address]    Designated  policy reviewed. Instructed to have someone stay 24 hours post procedure.     NSAIDs? No    Anticoagulation/blood thinners? No    Electronic implanted devices? No    Diabetic? No    Procedure indication: Screening    Bowel prep recommendation: Miralax prep without magnesium citrate    Reviewed procedure prep instructions.     Prep instructions sent via [employer].      Patient verbalized understanding and had no questions or concerns at this time.    Tammi Hanna RN  Endoscopy Procedure Pre Assessment RN

## 2023-06-20 ENCOUNTER — THERAPY VISIT (OUTPATIENT)
Dept: PHYSICAL THERAPY | Facility: CLINIC | Age: 73
End: 2023-06-20
Payer: COMMERCIAL

## 2023-06-20 DIAGNOSIS — N39.41 URGE INCONTINENCE OF URINE: ICD-10-CM

## 2023-06-20 DIAGNOSIS — N52.9 ED (ERECTILE DYSFUNCTION): ICD-10-CM

## 2023-06-20 DIAGNOSIS — R39.11 URINARY HESITANCY: Primary | ICD-10-CM

## 2023-06-20 PROCEDURE — 97110 THERAPEUTIC EXERCISES: CPT | Mod: GP | Performed by: PHYSICAL THERAPIST

## 2023-06-20 PROCEDURE — 97530 THERAPEUTIC ACTIVITIES: CPT | Mod: GP | Performed by: PHYSICAL THERAPIST

## 2023-06-26 NOTE — TELEPHONE ENCOUNTER
Incoming call received from patient.     Patient states they are going to the movies tonight and wanted to know if they can have popcorn. Procedure is one week out from today, advised patient to NOT eat popcorn.     Patient wanting to know if they can eat prunes today. Informed patient that prunes are ok until low fiber diet day (3 days prior to scheduled procedure).    Patient had no further questions at this time.     Tammi Hawk RN  Endoscopy Procedure Pre Assessment RN  [phone] option 4

## 2023-06-28 PROBLEM — N52.9 ED (ERECTILE DYSFUNCTION): Status: RESOLVED | Noted: 2023-04-06 | Resolved: 2023-06-28

## 2023-06-28 PROBLEM — N39.41 URGE INCONTINENCE OF URINE: Status: RESOLVED | Noted: 2023-04-06 | Resolved: 2023-06-28

## 2023-06-28 PROBLEM — R39.11 URINARY HESITANCY: Status: RESOLVED | Noted: 2023-04-06 | Resolved: 2023-06-28

## 2023-06-28 NOTE — PROGRESS NOTES
06/20/23 0500   Appointment Info   Signing clinician's name / credentials Michelle Rush, PT, DPT   Total/Authorized Visits E&T 8   Visits Used 5   Medical Diagnosis UUI, ED   PT Tx Diagnosis Pelvic floor dysfunction   Quick Adds Certification   Progress Note/Certification   Start of Care Date 03/30/23   Onset of illness/injury or Date of Surgery 03/24/23   Therapy Frequency every 2 weeks   Predicted Duration 12 weeks   Certification date from 03/30/23   Certification date to 06/22/23   PT Goal 1   Goal Identifier LTG 1   Goal Description Urinate without hesitation or pain level 0/10 100% of the time, 8 urinations per day with no leakage   Rationale to maximize safety and independence with self cares   Target Date 06/22/23   Subjective Report   Subjective Report Patient reports that he feels fine, but had an interruption to routine with increased back pain about 2 weeks ago.  Is now back on routine.  He feels like he has about 80% improvement, urination is back to about what it was 5-10 years ago.  Erection has continued to improve, has improved since February.  Has about 75% improvement in erection.   Objective Measures   Objective Measures Objective Measure 1   Objective Measure 1   Objective Measure Coordination   Details improved quadruped coordination   Treatment Interventions (PT)   Interventions Therapeutic Procedure/Exercise;Therapeutic Activity;Neuromuscular Re-education   Therapeutic Procedure/Exercise   Therapeutic Procedures: strength, endurance, ROM, flexibillity minutes (33279) 20   PTRx Ther Proc 1 Bridging Pelvic Floor - Men's Health   PTRx Ther Proc 1 - Details No Notes   PTRx Ther Proc 2 Pelvic Floor Muscle Strengthening Basic - Men's Health   PTRx Ther Proc 2 - Details x 15   PTRx Ther Proc 3 Supine Abdominal Exercise #3B (Two Leg Marching)   PTRx Ther Proc 3 - Details x 15   PTRx Ther Proc 4 Supine Butterfly   PTRx Ther Proc 4 - Details 30 sec x 3 with cues for PFM relaxation   PTRx Ther Proc  5 Happy Baby   PTRx Ther Proc 5 - Details No Notes   PTRx Ther Proc 6 Sidelying Hip Abduction at Wall in Neutral and 45 degrees   PTRx Ther Proc 6 - Details x 15 B LE   PTRx Ther Proc 7 All 4s Cat Cow   PTRx Ther Proc 7 - Details x 15 tactile cues for pelvic mobility, cues for relaxing abdomen x 5 min   Skilled Intervention for proper muscle coordination   Patient Response/Progress difficult to coordination, requires spouse's help to implement HEP   Ther Proc 1 Pelvic floor coordination   Ther Proc 1 - Details cues for proper pelvic floor squeeze and relax   Therapeutic Activity   Therapeutic Activities: dynamic activities to improve functional performance minutes (91986) 13   Therapeutic Activities Ther Act 2;Ther Act 3   Ther Act 1 Graded exposure   Ther Act 1 - Details discussed graded exposure to erection   Ther Act 2 Progress   Ther Act 2 - Details discussed continued exercises   Ther Act 3 Urinary habits   Ther Act 3 - Details discussed relaxation of pelvic floor and need for continued relaxation on the toilet   PTRx Ther Act 1 Diaphragmatic Breathing   PTRx Ther Act 1 - Details reviewed   Skilled Intervention understand POC   Patient Response/Progress pt understands   Education   Learner/Method No Barriers to Learning   Plan   Home program Given PtRx, follow up as needed   Total Session Time   Timed Code Treatment Minutes 33   Total Treatment Time (sum of timed and untimed services) 33         DISCHARGE  Reason for Discharge: Patient has met all goals.    Equipment Issued:     Discharge Plan: Patient to continue home program.    Referring Provider:  Varinder See

## 2023-07-02 ENCOUNTER — NURSE TRIAGE (OUTPATIENT)
Dept: NURSING | Facility: CLINIC | Age: 73
End: 2023-07-02
Payer: COMMERCIAL

## 2023-07-02 NOTE — TELEPHONE ENCOUNTER
Pt calling to discuss slightly conflicting colonoscopy prep instructions. Went through instructions with pt and he verbalized understanding.     Adamaris Roberto, RN, BSN  [facility] Nurse Advisor 3:04 PM 7/2/2023    Reason for Disposition    Health Information question, no triage required and triager able to answer question    Additional Information    Negative: [1] Caller is not with the adult (patient) AND [2] reporting urgent symptoms    Negative: Lab result questions    Negative: Medication questions    Negative: Caller can't be reached by phone    Negative: Caller has already spoken to PCP or another triager    Negative: RN needs further essential information from caller in order to complete triage    Negative: Requesting regular office appointment    Negative: [1] Caller requesting NON-URGENT health information AND [2] PCP's office is the best resource    Protocols used: INFORMATION ONLY CALL - NO TRIAGE-A-

## 2023-07-03 ENCOUNTER — ANESTHESIA (OUTPATIENT)
Dept: SURGERY | Facility: AMBULATORY SURGERY CENTER | Age: 73
End: 2023-07-03
Payer: COMMERCIAL

## 2023-07-03 ENCOUNTER — ANESTHESIA EVENT (OUTPATIENT)
Dept: SURGERY | Facility: AMBULATORY SURGERY CENTER | Age: 73
End: 2023-07-03
Payer: COMMERCIAL

## 2023-07-03 ENCOUNTER — HOSPITAL ENCOUNTER (OUTPATIENT)
Facility: AMBULATORY SURGERY CENTER | Age: 73
Discharge: HOME | End: 2023-07-03
Attending: INTERNAL MEDICINE
Payer: COMMERCIAL

## 2023-07-03 VITALS
TEMPERATURE: 97.6 F | DIASTOLIC BLOOD PRESSURE: 70 MMHG | SYSTOLIC BLOOD PRESSURE: 142 MMHG | OXYGEN SATURATION: 100 % | RESPIRATION RATE: 16 BRPM | HEART RATE: 63 BPM

## 2023-07-03 VITALS — HEART RATE: 59 BPM

## 2023-07-03 LAB — COLONOSCOPY: NORMAL

## 2023-07-03 PROCEDURE — 45380 COLONOSCOPY AND BIOPSY: CPT | Mod: 59,PT

## 2023-07-03 PROCEDURE — 88305 TISSUE EXAM BY PATHOLOGIST: CPT | Mod: 26 | Performed by: PATHOLOGY

## 2023-07-03 PROCEDURE — 88305 TISSUE EXAM BY PATHOLOGIST: CPT | Mod: TC | Performed by: INTERNAL MEDICINE

## 2023-07-03 PROCEDURE — 45385 COLONOSCOPY W/LESION REMOVAL: CPT | Mod: PT

## 2023-07-03 RX ORDER — OMEGA-3 FATTY ACIDS/FISH OIL 300-1000MG
200 CAPSULE ORAL
COMMUNITY

## 2023-07-03 RX ORDER — LIDOCAINE 40 MG/G
CREAM TOPICAL
Status: DISCONTINUED | OUTPATIENT
Start: 2023-07-03 | End: 2023-07-03 | Stop reason: HOSPADM

## 2023-07-03 RX ORDER — NALOXONE HYDROCHLORIDE 0.4 MG/ML
0.4 INJECTION, SOLUTION INTRAMUSCULAR; INTRAVENOUS; SUBCUTANEOUS
Status: DISCONTINUED | OUTPATIENT
Start: 2023-07-03 | End: 2023-07-04 | Stop reason: HOSPADM

## 2023-07-03 RX ORDER — PROCHLORPERAZINE MALEATE 5 MG
5 TABLET ORAL EVERY 6 HOURS PRN
Status: DISCONTINUED | OUTPATIENT
Start: 2023-07-03 | End: 2023-07-04 | Stop reason: HOSPADM

## 2023-07-03 RX ORDER — ONDANSETRON 2 MG/ML
4 INJECTION INTRAMUSCULAR; INTRAVENOUS
Status: DISCONTINUED | OUTPATIENT
Start: 2023-07-03 | End: 2023-07-03 | Stop reason: HOSPADM

## 2023-07-03 RX ORDER — LIDOCAINE HYDROCHLORIDE 20 MG/ML
INJECTION, SOLUTION INFILTRATION; PERINEURAL PRN
Status: DISCONTINUED | OUTPATIENT
Start: 2023-07-03 | End: 2023-07-03

## 2023-07-03 RX ORDER — SODIUM CHLORIDE, SODIUM LACTATE, POTASSIUM CHLORIDE, CALCIUM CHLORIDE 600; 310; 30; 20 MG/100ML; MG/100ML; MG/100ML; MG/100ML
INJECTION, SOLUTION INTRAVENOUS CONTINUOUS PRN
Status: DISCONTINUED | OUTPATIENT
Start: 2023-07-03 | End: 2023-07-03

## 2023-07-03 RX ORDER — PROPOFOL 10 MG/ML
INJECTION, EMULSION INTRAVENOUS CONTINUOUS PRN
Status: DISCONTINUED | OUTPATIENT
Start: 2023-07-03 | End: 2023-07-03

## 2023-07-03 RX ORDER — NALOXONE HYDROCHLORIDE 0.4 MG/ML
0.2 INJECTION, SOLUTION INTRAMUSCULAR; INTRAVENOUS; SUBCUTANEOUS
Status: DISCONTINUED | OUTPATIENT
Start: 2023-07-03 | End: 2023-07-04 | Stop reason: HOSPADM

## 2023-07-03 RX ORDER — ONDANSETRON 2 MG/ML
4 INJECTION INTRAMUSCULAR; INTRAVENOUS EVERY 6 HOURS PRN
Status: DISCONTINUED | OUTPATIENT
Start: 2023-07-03 | End: 2023-07-04 | Stop reason: HOSPADM

## 2023-07-03 RX ORDER — FLUMAZENIL 0.1 MG/ML
0.2 INJECTION, SOLUTION INTRAVENOUS
Status: DISCONTINUED | OUTPATIENT
Start: 2023-07-03 | End: 2023-07-04 | Stop reason: HOSPADM

## 2023-07-03 RX ORDER — ONDANSETRON 4 MG/1
4 TABLET, ORALLY DISINTEGRATING ORAL EVERY 6 HOURS PRN
Status: DISCONTINUED | OUTPATIENT
Start: 2023-07-03 | End: 2023-07-04 | Stop reason: HOSPADM

## 2023-07-03 RX ADMIN — LIDOCAINE HYDROCHLORIDE 60 MG: 20 INJECTION, SOLUTION INFILTRATION; PERINEURAL at 10:56

## 2023-07-03 RX ADMIN — SODIUM CHLORIDE, SODIUM LACTATE, POTASSIUM CHLORIDE, CALCIUM CHLORIDE: 600; 310; 30; 20 INJECTION, SOLUTION INTRAVENOUS at 10:53

## 2023-07-03 RX ADMIN — PROPOFOL 100 MCG/KG/MIN: 10 INJECTION, EMULSION INTRAVENOUS at 11:28

## 2023-07-03 RX ADMIN — PROPOFOL 350 MCG/KG/MIN: 10 INJECTION, EMULSION INTRAVENOUS at 10:58

## 2023-07-03 NOTE — H&P
Jefry Hatfield  [id number]  male  72 year old      Reason for procedure/surgery: surveillance    Patient Active Problem List   Diagnosis     Vision changes     Senile nuclear sclerosis     Malignant myopia     Pseudophakia of both eyes - Both Eyes     Myopic degeneration     Anisometropia     Xerostomia     Adenomatous polyp of colon       Past Surgical History:    Past Surgical History:   Procedure Laterality Date     APPENDECTOMY       CATARACT IOL, RT/LT Bilateral 11/14/12LE 7/1/14RE    BE     COLONOSCOPY N/A 5/25/2018    Procedure: COLONOSCOPY;  Screening Colonoscopy;  Surgeon: Zak Kaplan MD;  [facility]     PHACOEMULSIFICATION CLEAR CORNEA WITH STANDARD INTRAOCULAR LENS IMPLANT  7/1/2014    Procedure: PHACOEMULSIFICATION CLEAR CORNEA WITH STANDARD INTRAOCULAR LENS IMPLANT;  Surgeon: Milan Bernardo MD;  [facility]     SCLERAL BUCKLE  1971    RE     SCLERAL BUCKLE  1980    LE     YAG CAPSULOTOMY OD (RIGHT EYE)  10/30/2014       Past Medical History:   Past Medical History:   Diagnosis Date     Anisometropia      Cataract, right eye      Cervical radiculopathy      High myopia      Myopic degeneration      Posterior staphyloma     LE     Pseudophakia of left eye      Retinal detachment RE 1971,[id number]    BE       Social History:   Social History     Tobacco Use     Smoking status: Never     Passive exposure: Never     Smokeless tobacco: Never   Substance Use Topics     Alcohol use: Yes     Comment: occaisonal        Family History:   Family History   Problem Relation Age of Onset     Dementia Mother      Heart Failure Father      Asthma Father      Diabetes Paternal Grandfather      Cancer No family hx of      Glaucoma No family hx of      Macular Degeneration No family hx of      Skin Cancer No family hx of      Melanoma No family hx of        Allergies: No Known Allergies    Active Medications:   Current Outpatient Medications   Medication Sig Dispense Refill      CALCIUM-MAG-VIT C-VIT D PO Take by mouth 2 times daily       Omega-3 Fatty Acids (OMEGA-3 FISH OIL PO) Take by mouth daily       tacrolimus (PROTOPIC) 0.1 % external ointment Apply topically 2 times daily 60 g 2     VITAMIN D, CHOLECALCIFEROL, PO Take by mouth daily       ibuprofen (ADVIL/MOTRIN) 200 MG capsule Take 200 mg by mouth       nortriptyline (PAMELOR) 25 MG capsule 25 mg daily  2     Saw [place], Serenoa repens, (SAW PALMETTO EXTRACT PO) Take by mouth daily         Systemic Review:   CONSTITUTIONAL: NEGATIVE for fever, chills, change in weight  ENT/MOUTH: NEGATIVE for ear, mouth and throat problems  RESP: NEGATIVE for significant cough or SOB  CV: NEGATIVE for chest pain, palpitations or peripheral edema    Physical Examination:   Vital Signs: /67 (BP Location: Right arm)   Pulse 63   Temp (!) 96.1  F (35.6  C) (Temporal)   Resp 16   SpO2 100%   GENERAL: healthy, alert and no distress  NECK: no adenopathy, no asymmetry, masses, or scars  RESP: lungs clear to auscultation - no rales, rhonchi or wheezes  CV: regular rate and rhythm, normal S1 S2, no S3 or S4, no murmur, click or rub, no peripheral edema and peripheral pulses strong  ABDOMEN: soft, nontender, no hepatosplenomegaly, no masses and bowel sounds normal  MS: no gross musculoskeletal defects noted, no edema    Plan: Appropriate to proceed as scheduled.      Nancy Silvestre MD  7/3/2023    PCP:  Varinder See

## 2023-07-03 NOTE — ANESTHESIA CARE TRANSFER NOTE
Patient: Jefry Hatfield    Procedure: Procedure(s):  COLONOSCOPY, WITH POLYPECTOMY AND BIOPSY       Diagnosis: Screening for condition [Z13.9]  Diagnosis Additional Information: No value filed.    Anesthesia Type:   MAC     Note:    Oropharynx: oropharynx clear of all foreign objects and spontaneously breathing  Level of Consciousness: awake  Oxygen Supplementation: room air    Independent Airway: airway patency satisfactory and stable  Dentition: dentition unchanged  Vital Signs Stable: post-procedure vital signs reviewed and stable  Report to RN Given: handoff report given  Patient transferred to: Phase II  Comments: Report to Phase II RN. Resps easy and regular.   Handoff Report: Identifed the Patient, Identified the Reponsible Provider, Reviewed the pertinent medical history, Discussed the surgical course, Reviewed Intra-OP anesthesia mangement and issues during anesthesia, Set expectations for post-procedure period and Allowed opportunity for questions and acknowledgement of understanding      Vitals:  Vitals Value Taken Time   BP     Temp     Pulse     Resp     SpO2         Electronically Signed By: DILLAN PLAZA CRNA  July 3, 2023  11:37 AM

## 2023-07-03 NOTE — ANESTHESIA PREPROCEDURE EVALUATION
Anesthesia Pre-Procedure Evaluation    Patient: Jefry Hatfield   MRN: [id number] : 1950        Procedure : Procedure(s):  Colonoscopy          Past Medical History:   Diagnosis Date     Anisometropia      Cataract, right eye      Cervical radiculopathy      High myopia      Myopic degeneration      Posterior staphyloma     LE     Pseudophakia of left eye      Retinal detachment RE ,[id number]    BE      Past Surgical History:   Procedure Laterality Date     APPENDECTOMY       CATARACT IOL, RT/LT Bilateral 12LE 14RE    BE     COLONOSCOPY N/A 2018    Procedure: COLONOSCOPY;  Screening Colonoscopy;  Surgeon: Zak Kaplan MD;  [facility]     PHACOEMULSIFICATION CLEAR CORNEA WITH STANDARD INTRAOCULAR LENS IMPLANT  2014    Procedure: PHACOEMULSIFICATION CLEAR CORNEA WITH STANDARD INTRAOCULAR LENS IMPLANT;  Surgeon: Milan Bernardo MD;  [facility]     SCLERAL BUCKLE      RE     SCLERAL BUCKLE      LE     YAG CAPSULOTOMY OD (RIGHT EYE)  10/30/2014      No Known Allergies   Social History     Tobacco Use     Smoking status: Never     Passive exposure: Never     Smokeless tobacco: Never   Substance Use Topics     Alcohol use: Yes     Comment: occaisonal       Wt Readings from Last 1 Encounters:   23 77 kg (169 lb 12.8 oz)        Anesthesia Evaluation   Pt has had prior anesthetic.     No history of anesthetic complications       ROS/MED HX  ENT/Pulmonary:       Neurologic:  - neg neurologic ROS     Cardiovascular:  - neg cardiovascular ROS     METS/Exercise Tolerance: >4 METS    Hematologic:       Musculoskeletal: Comment: Cervical radiculopathy      GI/Hepatic:    (-) GERD   Renal/Genitourinary:  - neg Renal ROS     Endo:  - neg endo ROS     Psychiatric/Substance Use:       Infectious Disease:       Malignancy:       Other:            Physical Exam    Airway        Mallampati: I   TM distance: > 3 FB   Neck ROM: full   Mouth opening: > 3  cm    Respiratory Devices and Support         Dental       (+) Minor Abnormalities - some fillings, tiny chips      Cardiovascular             Pulmonary                   OUTSIDE LABS:  CBC:   Lab Results   Component Value Date    WBC 4.6 10/21/2022    WBC 6.6 04/29/2018    HGB 14.7 10/21/2022    HGB 15.5 04/29/2018    HCT 44.8 10/21/2022    HCT 44.5 04/29/2018     10/21/2022     04/29/2018     BMP:   Lab Results   Component Value Date     10/21/2022     04/29/2018    POTASSIUM 4.4 10/21/2022    POTASSIUM 4.5 04/29/2018    CHLORIDE 103 10/21/2022    CHLORIDE 106 04/29/2018    CO2 30 (H) 10/21/2022    CO2 31 04/29/2018    BUN 20.1 10/21/2022    BUN 29 04/29/2018    CR 1.10 10/21/2022    CR 1.24 04/29/2018     (H) 10/21/2022     (H) 04/29/2018     COAGS:   Lab Results   Component Value Date    PTT 27 08/18/2015    INR 1.00 08/18/2015     POC: No results found for: BGM, HCG, HCGS  HEPATIC:   Lab Results   Component Value Date    ALBUMIN 4.2 10/21/2022    PROTTOTAL 6.8 10/21/2022    ALT 17 10/21/2022    AST 21 10/21/2022    ALKPHOS 153 (H) 10/21/2022    BILITOTAL 0.4 10/21/2022     OTHER:   Lab Results   Component Value Date    GIOVANNA 9.9 10/21/2022       Anesthesia Plan    ASA Status:  1   NPO Status:  NPO Appropriate    Anesthesia Type: MAC.     - Reason for MAC: straight local not clinically adequate   Induction: Intravenous, Propofol.   Maintenance: TIVA.        Consents    Anesthesia Plan(s) and associated risks, benefits, and realistic alternatives discussed. Questions answered and patient/representative(s) expressed understanding.    - Discussed:     - Discussed with:  Patient         Postoperative Care       PONV prophylaxis: Background Propofol Infusion     Comments:                Gaurav Catherine MD

## 2023-07-03 NOTE — ANESTHESIA POSTPROCEDURE EVALUATION
Patient: Jefry Hatfield    Procedure: Procedure(s):  COLONOSCOPY, WITH POLYPECTOMY AND BIOPSY       Anesthesia Type:  MAC    Note:  Disposition: Outpatient   Postop Pain Control: Uneventful            Sign Out: Well controlled pain   PONV: No   Neuro/Psych: Uneventful            Sign Out: Acceptable/Baseline neuro status   Airway/Respiratory: Uneventful            Sign Out: Acceptable/Baseline resp. status   CV/Hemodynamics: Uneventful            Sign Out: Acceptable CV status; No obvious hypovolemia; No obvious fluid overload   Other NRE: NONE   DID A NON-ROUTINE EVENT OCCUR?            Last vitals:  Vitals Value Taken Time   /70 07/03/23 1152   Temp 36.4  C (97.6  F) 07/03/23 1152   Pulse     Resp 16 07/03/23 1152   SpO2 100 % 07/03/23 1152       Electronically Signed By: Parish Yang MD  July 3, 2023  2:59 PM

## 2023-07-03 NOTE — DISCHARGE INSTRUCTIONS
Discharge Instructions after Colonoscopy    Today you had a ____ Colonoscopy ____     Activity and Diet  You were given medicine for pain. You may be dizzy or sleepy.  For 24 hours:   Do not drive or use heavy equipment.   Do not make important decisions.   Do not drink any alcohol.  You may return to your normal diet and medicines.    Discomfort   Air was placed in your colon during the exam in order to see it. Walking helps to pass the air.   You may take Tylenol (acetaminophen) for pain unless your doctor has told you not to.  Do not take aspirin or ibuprofen (Advil, Motrin, or other anti-inflammatory  drugs) for _____ days.    Follow-up  ____ We took small tissue samples or polyps to study. Your doctor will call you with the results  within two weeks.    When to call:    Call right away if you have:   Unusual pain in belly or chest pain not relieved with passing air.   More than 1 to 2 Tablespoons of bleeding from your rectum.   Fever above 100.6  F (37.5  C).    If you have severe pain, bleeding, or shortness of breath, go to an emergency room.    If you have questions, call:  Monday to Friday, 8 a.m. to 4:30 p.m.  Central Scheduling Department: [phone]    After hours  [facility]: [phone] (Ask for the GI fellow on call)

## 2023-07-05 LAB
PATH REPORT.COMMENTS IMP SPEC: NORMAL
PATH REPORT.FINAL DX SPEC: NORMAL
PATH REPORT.GROSS SPEC: NORMAL
PATH REPORT.MICROSCOPIC SPEC OTHER STN: NORMAL
PATH REPORT.RELEVANT HX SPEC: NORMAL
PHOTO IMAGE: NORMAL

## 2023-07-18 ENCOUNTER — VIRTUAL VISIT (OUTPATIENT)
Dept: INTERNAL MEDICINE | Facility: CLINIC | Age: 73
End: 2023-07-18
Payer: COMMERCIAL

## 2023-07-18 VITALS — BODY MASS INDEX: 23.73 KG/M2 | WEIGHT: 175 LBS

## 2023-07-18 DIAGNOSIS — Z98.890 H/O COLONOSCOPY: Primary | ICD-10-CM

## 2023-07-18 PROCEDURE — 99442 PR PHYSICIAN TELEPHONE EVALUATION 11-20 MIN: CPT | Mod: 93 | Performed by: INTERNAL MEDICINE

## 2023-07-18 ASSESSMENT — PAIN SCALES - GENERAL: PAINLEVEL: NO PAIN (0)

## 2023-07-18 NOTE — PROGRESS NOTES
"Telephone note     Mr. Hatfield agrees to a telephone note. Overall doing well. He denies any significant gastrointestinal symptoms. No loose stools. No abdominal cramping. No systemic sxs. We reviewed his recent colonoscopy done by Dr. Silvestre  7/3/2023. \"A single ulcer in the terminal ileum\" Pathology: Final Diagnosis  A(1). TERMINAL ILEUM ULCER BIOPSY:  - Focal active ileitis with cryptitis and ulceration    Past Medical History:   Diagnosis Date     Anisometropia      Cataract, right eye      Cervical radiculopathy      High myopia      Myopic degeneration      Posterior staphyloma     LE     Pseudophakia of left eye      Retinal detachment RE 1971,[id number]    BE     Past Surgical History:   Procedure Laterality Date     APPENDECTOMY       CATARACT IOL, RT/LT Bilateral 11/14/12LE 7/1/14RE    BE     COLONOSCOPY N/A 5/25/2018    Procedure: COLONOSCOPY;  Screening Colonoscopy;  Surgeon: Zak Kaplan MD;  [facility]     COLONOSCOPY N/A 7/3/2023    Procedure: COLONOSCOPY, WITH POLYPECTOMY AND BIOPSY;  Surgeon: Nancy Silvestre MD;  Location: [facility] OR     PHACOEMULSIFICATION CLEAR CORNEA WITH STANDARD INTRAOCULAR LENS IMPLANT  7/1/2014    Procedure: PHACOEMULSIFICATION CLEAR CORNEA WITH STANDARD INTRAOCULAR LENS IMPLANT;  Surgeon: Milan Bernardo MD;  Location:  [facility]     SCLERAL BUCKLE  1971    RE     SCLERAL BUCKLE  1980    LE     YAG CAPSULOTOMY OD (RIGHT EYE)  10/30/2014       A/P:     1. Pelvic Health PT  6/28/2023  with Ms. Rush. He states that his urinary symptoms and erectile dysfunction are less getting this pelvic PT. He wants to continue with this before seeing Urology or starting Viagara.   2. Dermatology appt. With Dr. Garcia seen 12/13/2022  3. Immunizations; He has completed the Shingrix vaccine series Tdap 3/27/2018. Prevnar 13 done 3/27/2018. COVID Moderna vaccine x 5 bi-valent 10/9/2022.   4. Colonoscopy; 5/25/2018 and repeat in 5 years. Colonoscopy was done 7/3/2023. Will discuss " with Dr. Zapata, GI regarding terminal ileum ulcer.   5. PSA; 1.53 on 10/21/2022.   6. Lipids; 10/21/2022; TG's 53, HDL 59 and    7. B Iliotibal band syndrome, seen 11/14/2022 by Dr. Butt and he has had PT. He had B knee X-rays 9/12/2022  8. Vitamin D level 44 on 10/21/2022      Total time on the phone 15 minutes.

## 2023-07-18 NOTE — NURSING NOTE
Is the patient currently in the state of [place]? YES    Visit mode:TELEPHONE    If the visit is dropped, the patient can be reconnected by: VIDEO VISIT: Text to cell phone: [phone]    Will anyone else be joining the visit? NO      How would you like to obtain your AVS? MyChart    Are changes needed to the allergy or medication list? NO    Reason for visit: RECHECK

## 2023-07-31 ENCOUNTER — MYC MEDICAL ADVICE (OUTPATIENT)
Dept: INTERNAL MEDICINE | Facility: CLINIC | Age: 73
End: 2023-07-31
Payer: COMMERCIAL

## 2023-08-04 ENCOUNTER — ALLIED HEALTH/NURSE VISIT (OUTPATIENT)
Dept: INTERNAL MEDICINE | Facility: CLINIC | Age: 73
End: 2023-08-04
Payer: COMMERCIAL

## 2023-08-04 DIAGNOSIS — H61.23 BILATERAL IMPACTED CERUMEN: Primary | ICD-10-CM

## 2023-08-04 PROCEDURE — 69209 REMOVE IMPACTED EAR WAX UNI: CPT | Mod: 50

## 2023-08-04 NOTE — PROGRESS NOTES
[facility] today at the request of the patient for an ear wash. The patient's ears were assessed for cerumen. After this, an ear wash was performed in which a large amount of impacted cerumen was removed from both ear/s; specifically, 750 mL of H2O/trace H2O2 were used to lavage the patient's left ear and 750 mL of H2O/trace H2O2 were used to lavage the patient's right ear. After the ear wash, the tympanic membrane was visible. The ear wash was provided today under the supervision of Dr. See who was present if help was needed.    Monica Patel, [place]  2:23 PM 8/4/2023

## 2023-08-07 ENCOUNTER — ALLIED HEALTH/NURSE VISIT (OUTPATIENT)
Dept: OPHTHALMOLOGY | Facility: CLINIC | Age: 73
End: 2023-08-07
Attending: OPTOMETRIST
Payer: COMMERCIAL

## 2023-08-07 DIAGNOSIS — H52.13 MYOPIA OF BOTH EYES: Primary | ICD-10-CM

## 2023-08-07 PROCEDURE — 99207 PR NO CHARGE LOS: CPT

## 2023-09-19 DIAGNOSIS — H43.393 VITREOUS SYNERESIS, BILATERAL: Primary | ICD-10-CM

## 2023-09-27 ENCOUNTER — OFFICE VISIT (OUTPATIENT)
Dept: OPHTHALMOLOGY | Facility: CLINIC | Age: 73
End: 2023-09-27
Attending: OPHTHALMOLOGY
Payer: COMMERCIAL

## 2023-09-27 DIAGNOSIS — H43.393 VITREOUS SYNERESIS, BILATERAL: ICD-10-CM

## 2023-09-27 DIAGNOSIS — H15.833 POSTERIOR STAPHYLOMA, BILATERAL: Primary | ICD-10-CM

## 2023-09-27 PROCEDURE — 99214 OFFICE O/P EST MOD 30 MIN: CPT | Mod: GC | Performed by: OPHTHALMOLOGY

## 2023-09-27 PROCEDURE — G0463 HOSPITAL OUTPT CLINIC VISIT: HCPCS | Performed by: OPHTHALMOLOGY

## 2023-09-27 PROCEDURE — 92134 CPTRZ OPH DX IMG PST SGM RTA: CPT | Performed by: OPHTHALMOLOGY

## 2023-09-27 ASSESSMENT — VISUAL ACUITY
OD_PH_CC+: -1
OD_CC: 20/150
OS_PH_CC+: -1
OD_PH_CC: 20/100
OS_PH_CC: 20/100
METHOD: SNELLEN - LINEAR
OS_CC+: -1
OS_CC: 20/125
CORRECTION_TYPE: CONTACTS

## 2023-09-27 ASSESSMENT — CONF VISUAL FIELD
OD_INFERIOR_NASAL_RESTRICTION: 1
OD_SUPERIOR_TEMPORAL_RESTRICTION: 3
OS_SUPERIOR_TEMPORAL_RESTRICTION: 3
OS_INFERIOR_NASAL_RESTRICTION: 1
OD_INFERIOR_TEMPORAL_RESTRICTION: 3
METHOD: COUNTING FINGERS
OD_SUPERIOR_NASAL_RESTRICTION: 1
OS_SUPERIOR_NASAL_RESTRICTION: 3
OS_INFERIOR_TEMPORAL_RESTRICTION: 3

## 2023-09-27 ASSESSMENT — SLIT LAMP EXAM - LIDS
COMMENTS: LOOSE LIDS
COMMENTS: LOOSE LIDS

## 2023-09-27 ASSESSMENT — TONOMETRY
OD_IOP_MMHG: 14
OS_IOP_MMHG: 13
IOP_METHOD: ICARE

## 2023-09-27 ASSESSMENT — EXTERNAL EXAM - LEFT EYE: OS_EXAM: NORMAL

## 2023-09-27 ASSESSMENT — EXTERNAL EXAM - RIGHT EYE: OD_EXAM: ET

## 2023-09-27 NOTE — NURSING NOTE
Chief Complaints and History of Present Illnesses   Patient presents with    Follow Up     Chief Complaint(s) and History of Present Illness(es)       Follow Up              Laterality: both eyes    Associated symptoms: floaters.  Negative for eye pain and flashes    Treatments tried: no treatments              Comments    Here for follow up. [facility] doing fine. Stable floaters without flashes. No eye pain.    Iain CHURCH 12:05 PM September 27, 2023

## 2023-09-27 NOTE — PROGRESS NOTES
CC: follow up myopic degeneration, posterior staphylomas    HPI: Jefry Hatfield is a  70 year old year-old patient with history of myopic degeneration both eyes., He has been followed in the past by Dr. Gopi Anne.    Interval: Reports stable vision; no flashes and floaters   progressive decreased vision both eyes, no new flashes and floaters      OCULAR HISTORY  Retinal detachment  With scleral buckle both eyes  1971, [id number]  Cataract extraction/IOL right eye 7-1-14  Cataract extraction/IOL left eye 11-14-12  S/p yag pc right eye 10-30-14    Retinal Imaging:  OCT  9-27-23  RE: posterior staphyloma, retina atrophic changes. Thin choroid  LE: posterior staphyloma, retina atrophic changes. 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   -retina attached both eyes  - Eye exam stable  -Optical Coherence Tomography stable  - Retina detachment precautions were discussed with the patient (presence or increased in flashes, floaters or a curtain in the visual field) and was asked to return if any of the those occur    # pseudophakia both eyes - observe    Plan:  - observe  -Discussed with patient the use of low vision aids including orcam camera  - Follow up in 12 months, sooner as needed  - follow up with low vision rehab      ~~~~~~~~~~~~~~~~~~~~~~~~~~~~~~~~~~   Complete documentation of historical and exam elements from today's encounter can be found in the full encounter summary report (not reduplicated in this progress note).  I personally obtained the chief complaint(s) and history of present illness.  I confirmed and edited as necessary the review of systems, past medical/surgical history, family history, social history, and examination findings as documented by others; and I examined the patient myself.  I personally reviewed the relevant tests, images, and reports as documented above.  I personally reviewed the ophthalmic  test(s) associated with this encounter, agree with the interpretation(s) as documented by the resident/fellow, and have edited the corresponding report(s) as necessary.   I formulated and edited as necessary the assessment and plan and discussed the findings and management plan with the patient and family    Lisa Chang MD  .  Retina Service   Department of Ophthalmology and Visual Neurosciences   [facility]  Phone: [phone]   Fax: [phone]

## 2023-09-27 NOTE — PATIENT INSTRUCTIONS
Consider audio books https://www.[employer].org/cms/  https://www.Teburu.[place]/apps/ios-apps-for-blind-and-vision-impaired  APPS  1. ** Jayy Vision  Description: Jayy Vision is an object and color recognizer that helps the blind, visually impaired, and color blind understand their surroundings. Simply point your phone at the object of interest and press any of the recognition buttons at the bottom of the screen to turn on the artificial intelligence.  Aipoly Vision will keep running and recognizing objects until you switch to a different mode, or toggle the recognition button.  Aipoly Vision can recognize 1,000 essential items for free, and many more through a subscription.  Compatibility: iPhone 5S and above, iPad Air and above, iPad Mini 2 and above.  Aipoly Vision comes with the option to subscribe for a monthly fee to unlock a more varied recognition of plants, animals, and food, and improving the general recognition of objects increasing the number of identifiable items by four times. The subscription also allows the reading of text, and identification of currency.   Link: Website: http://Devtoo/   iTunes: https://Fanvibe.Saatchi Art.[place]/us/gigi/Trius Therapeutics-vision-sight-for-blind-visually-impaired/ah8842708087?mt=8  2. ** Aira  Description: Aira offers an always-on service designed to help blind and low-vision users gain greater mobility and independence. By utilizing live video, audio and data streams, Hackers / Founders provides personalized assistance from trained Aira Agents, family members, or friends. These agents, backed by state-of-the art technology, serve as visual interpreters, helping our users accomplish a wide range of daily tasks and activities.  Continued use of GPS running in the background can dramatically decrease battery life.    Compatibility: Requires Paper.li 9.0 or later. Compatible with iPhone, iPad, and iPod touch.  Cost: Free  Link: Website: https://The Start Project.nGAP/ - home  iTunes:  https://Tech in Asia/Tellyo/scottie/aira/bs2897585877?mt=8  3.** Protek-dor  Description:  Protek-dor is pioneering accessible navigation both indoors and outdoors. Know where you are, know where you're going, travel with confidence.  Protek-dor uses GPS and the compass to locate you. It then gathers information about the surrounding environment from [facility]. Protek-dor has some unique algorithms to decide what information is the most relevant and then speaks it to you with high quality speech synthesis.    What's the most popular café within 200 meters radius? Where is the post office or the library?    You can control Protek-dor also through voice commands. The Voice Command feature is a premium service requiring purchase of credits for continuous use. Voice Command Credits are available as an in-scottie purchase on the Scottie Store.  Compatibility: Requires Integral Wave Technologies 9.0 or later. Compatible with iPhone, iPad, and iPod touch.  Cost: $39.99  Link: Website: http://www.ClearKarma/  Thirstys: https://Tech in Asia/us/scottie/aira/rr5755719371?mt=8  4. ** Be My Eyes  Description: Be the eyes for a blind person in need of help remotely through a live video connection if you are sighted or be assisted by the network of sighted users if you are blind.  Be My Eyes is all about contributing to and benefiting from small acts of kindness, so hop on board and get involved!  Blind users can request help from a sighted person and the sighted users will then be called for help. As soon as the first sighted user accepts the request for help a live audio-video connection will be set up between the two and the sighted user can tell the blind person what she sees when the blind user points his phone at something using the rear-facing camera.  Compatibility:  Requires iOS 10.0 or later. Compatible with iPhone, iPad, and iPod touch. Android version currently in development.  Link: Website: http://bemyeyes.com/  AppFirstunes:  https://Southern Sports Leagues/Dynamic Signal/gigi/be-my-eyes-helping-blind-see/ke578217716?mt=8    5. ** Digit-Eyes  Description: Barcode reader. Scan UPC / GREGORIO codes and hear the names of over 37 million products! Make your own QR code labels on the Digit-Eyes website and print them on inexpensive address labels. These barcoded labels may contain text that VoiceOver reads aloud or they can be used to record audio on your iPhone or iPad that is played back whenever the bar code is scanned.   Compatibility:  Requires convoy therapeutics 6.1 or later. Compatible with iPhone, iPad, and iPod touch.  Cost: $9.99  Link: Website: http://ShaveLogic/  Leader Technologies: https://Southern Sports Leagues/Dynamic Signal/gigi/digit-eyes/gi780791314?mt=8    6.** KNFB [place]  Description: Take a pic. The gigi reads the print.  The gigi helps you get a good photo! Hear it aloud, or read in Braille.  High quality text to speech OCR.   Reads multiple pages.   Viewfinder assist, tilt assist, and automatic text detection. Copy it. Send it. Reads image-based PDF and JPEG files. Exports HTML and TXT files.  Navigates by line, sentence, word, or character. Synchronised text highlighting.   Multi-language support.   Compatibility: Requires convoy therapeutics 8.0 or later. Compatible with iPhone, iPad, and iPod touch. Also compatible with andpowervault devices and Windows 10.  Cost: $99.99  Link: Website: http://www.Sooligan/  Traycer Diagnostic Systemss:https://Southern Sports Leagues/us/gigi/knfb-reader/te110689328?mt=8    7.** LookTel Money [place]  Description: LookTel Money [place] instantly recognizes currency and speaks the denomination, enabling people experiencing visual impairments or blindness to quickly and easily identify and count bills. Point the camera of your iOS device at a bill and the application will tell you the denomination in real-time. Twenty-one currencies are supported: the US Dollar, Costa Rican Dollar, Turks and Caicos Islander Dinar, Sudanese Real, French Jostin, Qatari Pound, [place] Dollar, Euro, Frisian Forint, Cuban  Shekel,  Rupee, Japanese Yen, Gabonese Dinar, Mexican Peso, New Zealand Dollar, Polish Zloty, Jamaican Jostin, Comoran Riyal, Singapore Dollar, and United [place] Emirates Dirham.    Compatibility: OS X 10.6.6 or later.  Cost: $9.99  Link: Website: http://www.looktel.com/moneyreader  ITunes: https://FanMob.Client Outlook/us/gigi/vkxktug-lkgix-rijyei/di645667873?mt=12    8.** Seeing AI  Description: Seeing AI is a free gigi that narrates the world around you. Designed for the blind and low vision community, this ongoing research project harnesses the power of AI to open up the visual world and describe nearby people, text, and objects.  Optimized for use with VoiceOver, the gigi enables you to recognize:    Short Text - Speaks text as soon as it appears in front of the camera.    Documents - Provides audio guidance to capture a printed page, and recognizes the text, along with its original formatting.    Products - Scans barcodes, using audio beeps to guide you; hear the name, and package information when available. (works with iPhone 6 and later)   People - Saves people s faces so you can recognize them, and get an estimate of their age, gender, and emotions.    Scenes (early preview) - Hear an overall description of the scene captured.    Images in other apps - Just tap  Share  and  Recognize with Seeing Earmark  to describe images from Mail, Photos, Twitter, and more.   Compatibility: Requires iOS 10.0 or later. Compatible with iPhone, iPad, and iPod touch.  Cost: Free.  Link: Website: https://www.Softricity/en-us/seeing-ai/  GMEXunes: https://FanMob.Client Outlook/us/gigi/rjegpz-or-ikdvhmx-camera-for-the-blind/vp363300693    9.** Seeing Eye GPS  Description: The Seeing Eye, the pioneer in guide dogs, and Sendero Group, the pioneers of accessible GPS, have teamed up to create The Seeing Eye GPS . Almost 20 years of user feedback and feature requests are behind the design of all Sendero GPS products. The accessible features in  The Seeing Eye GPS  are only the beginning of what is yet to come as users help in evolving this first fully accessible GPS product for the iPhone. The Seeing Eye GPS  is a fully accessible zpgo-qo-ntxy GPS iPhone gigi with all the normal navigation features plus features unique to blind users.  Unique to The Seeing Eye GPS    Instead of multiple layers of menus, the three important navigation elements are on the lower portion of every screen: Route, POIs and Location.   At intersections, the cross street and its orientation are announced   Intersections are described (e.g. four-way) with the clock face orientation of the streets.   There are two choices for POI data (IFCO Systems and [employer] Corporation).   Directions are configured appropriately for Pedestrian and Vehicle routes, including heads-up announcements for approaching turns, turn now, continue straight and upcoming turns.    If one wanders off the route, it's automatically recalculated and updated turn information is announced.   Point your phone in a given direction to hear what is nearby with the LookAround Wand.   Nearby Points of interest and intersections are automatically announced.   Ability to run in the background or with the screen locked for iOS 7. If you are running an older version of iOS 6 only the sound effects will play and the phone will vibrate at turns and at your destination but no speech output. Note: Continued use of GPS running in the background can dramatically decrease battery life.    Compatibility: iPhone 4S, iPhone 5, iPad Wi-Fi + 3G, iPad 2 WiFi + 3G, iPad Wi-Fi + 4G, iPad mini Wi-Fi + Cellular. iOS 9.0 or later.  The Seeing Eye GPS gigi uses VoiceOver feature for audio voice output and does not contain its own voice synthesizer.    Cost: Free to download, one month free trial available then subscription is required  Link: Website: http://www.ProspX/products/shopseeingeyegps.html  Subscription Version:  "https://Tagwhat.Tiggly/us/gigi/seeing-eye-gps/wy100621392?mt=8  One-Time Purchase, Non-Subscription Version: https://Pasteuria Bioscience/us/gigi/seeing-eye-gps-xt/zx403819017?mt=8  10.** TaptaICS Mobile  Description:  TapTapSee is a mobile camera application designed specifically for the blind and visually impaired YouHelp users, powered by the Ready image recognition API. The gigi utilizes the device s camera and VoiceOver functions to photograph objects and identify them out loud for the user.  In TapTaOuiCaree, the user double-taps the device s screen to photograph any two or three dimensional object at any angle, and have it accurately analyzed and defined within seconds. The device s VoiceOver then speaks the identification audibly to the user.  hiogi includes the following additional features: Repetition of the last image s identification, ability to upload images from the camera roll, share identification via Twitter, Facebook, text or email, rotor reader, flash toggle, and the ability to save the identified image to the camera roll with the attached tag.  Compatibility: Requires YouHelp 7.0 or later. Compatible with iPhone, iPad, and iPStudioSnaps touch. Also available for sim4tec.    Cost: free, though there may be some in gigi purchases.  Link: Website: http://Delver/  Vrvanashttps://Pasteuria Bioscience/us/gigi/gmvajhhhj-vkyee-gaaaddfh-impaired-camera/im498637451?mt=8  Google Play: https://Ciespace.Bitboys Oy.Lascaux Co./store/apps/details?id=com.msearcher.taptapsee.android  11.** Text   Description:  Text  is a fully accessible gigi for the Blind and those with low vision, that lets you read text of all kinds with your mobile device. Point the camera and you can read your mail, menus, cards, product packages, medication labels, and all kinds of other print around you. It's fully compatible with Kids Movie or ACHICA's text-selection \"speak\" option.  Great for reading text on the fly!    With Text  you can easily " edit, copy and paste text into documents, emails or other apps. It even keeps the text in a history for future use.  Compatibility:  Requires VastPark 7.1 or later. Compatible with iPhone, iPad, and iPod touch. Available for Android 4.0 and up.  Cost: Free.  Link:  Seeloz Inc.: https://Academia RFID.Uncovet/us/gigi/text-/my252082238?mt=8  Box Score Games: https://Ideal Network/store/apps/details?id=com.blindsight.textdetective&hl=en  12. ** Third Eye  Description: ThirdEye restores autonomy to visually impaired persons  lives by enabling them to recognize everyday objects. All the user has to do is touch one button and our technology verbally returns back whatever object the user is looking at within seconds (for example a  5 US Dollar Bill  or an  Ibuprofen bottle ).   Compatibility: Requires iOS 9.0 or later. Compatible with iPhone, iPad, and iPod touch. Requires AndAvalara 4.0.3 and up.  Cost: Free.  Link: Website: https://Opti-Logic/  Seeloz Inc.: https://Academia RFID.Uncovet/us/gigi/Newlans-AAMPP-inc./rb8527168712?mt=8  Box Score Games: https://Ideal Network/store/apps/details?id=thirdeye.thirdeye  [GEL: It has a general object recognition mode, and a text reading mode. This is another effort to use  deep learning  methods to do automated object recognition. I ve installed it but not tested it in detail.]

## 2023-12-18 ENCOUNTER — THERAPY VISIT (OUTPATIENT)
Dept: OCCUPATIONAL THERAPY | Facility: CLINIC | Age: 73
End: 2023-12-18
Attending: OPHTHALMOLOGY
Payer: COMMERCIAL

## 2023-12-18 DIAGNOSIS — H15.833 POSTERIOR STAPHYLOMA, BILATERAL: ICD-10-CM

## 2023-12-18 PROCEDURE — 97165 OT EVAL LOW COMPLEX 30 MIN: CPT | Mod: GO | Performed by: OCCUPATIONAL THERAPIST

## 2023-12-18 PROCEDURE — 97535 SELF CARE MNGMENT TRAINING: CPT | Mod: GO | Performed by: OCCUPATIONAL THERAPIST

## 2023-12-18 NOTE — PROGRESS NOTES
OCCUPATIONAL THERAPY EVALUATION  Type of Visit: Evaluation    See electronic medical record for Abuse and Falls Screening details.    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  1971, [id number]; S/p yag pc right eye 10-30-14;    Prior diagnostic imaging/testing results: Other annual eye check ups   Prior therapy history for the same diagnosis, illness or injury: Yes has had 3 rounds of Low Vision Rehab in past - most recent 2019    Prior Level of Function  Transfers: Independent  Ambulation: Independent - has a white cane and has been trained but doesn't use (carries with him per spouse's wishes)  ADL: Independent  IADL: Finances, Housekeeping, Laundry, Meal preparation, Medication management, Work, Yard work    Living Environment  Social support: With a significant other or spouse   Type of home: House   Stairs to enter the home: Yes 9 Is there a railing: Yes   Ramp: No   Stairs inside the home: Yes 10 Is there a railing: Yes   Help at home: None  Equipment owned:       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[employer]))    Hobbies/Interests: running, swimming, walking, T'ai Chi    Patient goals for therapy: Nothing comes to mind; see above    Pain assessment: Pain denied     Objective   LOW VISION EVALUATION  ADDITIONAL HISTORY:  Current Responsibilities - IADLS: Finances, Housekeeping, Laundry, Meal preparation, Medication management, Yard work, rakes leaves, helps with cooking, cleaning and laundry (spouse does most but patient assists for all)    Others present at visit: none    COGNITIVE/BEHAVIORAL:  Communication: Intact  Cognitive Status: Oriented  "to person, place and time  Behavior: Appropriate    Physical Status/Equipment   Physical Status: WNLs  Mobility Equipment Used:  white cane - see above (has but doesn't use)  Bathing ADL Equipment Used:  not addressed  Toileting ADL Equipment Used:  not addressed    VISUAL REPORT:  Functional complaints: Avocational tasks, Homemaking, Leisure, Reading, Safety in mobility, Writing  Visual Complaints: Constricted visual fields right eye, Constricted visual fields left eye, Phantom vision, Difficulty maintaining focus, Light sensitivity -  overcast days are ideal  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 Yes at home  Is glare a problem? Yes indoors, Yes outdoors  Are you satisfied with your sunglasses? Yes but concerned he will lose them (hard to find)  Sunglass Details:  Rayban, dark FL 41    VISUAL ACUITY:  Distance Acuity Right Eye: With correction, Per recent MD report,  20/150  Distance Acuity Left Eye: With correction, Per recent MD report, 20/125 -1  Distance Acuity Both Eyes Together:  not provided  Near Visual Acuity:  see below    CONTRAST SENSITIVITY:  Contrast Sensitivity (score/25): NT due to no reading glasses here    PREFERRED RETINAL LOCUS:  Inferior but then missing part of R UQ    MN Read  Smallest Print Size Read: with contact in R eye (forgot to bring bifocal for reading): 6.3M -1 at 16\"  Critical Print Size: 8M  Words per minute at critical print size: 38wpm    Visual Field:  Central Visual Field:  B eyes: more visible on the " top  Central Visual Field Screen Used: Clock Face  Peripheral Visual Field:  NT - see MD note    SRAFVP Scores:  Relevant Measures:    Composite Score:    Percentage of Disability:      Assessment & Plan   CLINICAL IMPRESSIONS  Medical Diagnosis: Posterior staphyloma, 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  The following significant findings have been identified: Impaired mobility and Impaired visual perception.  These identified deficits interfere with their ability to perform self care tasks, recreational activities, household chores, driving , household mobility, community mobility, medication management, financial management,  yard work, and meal planning and preparation as compared to previous level of function.     Clinical Decision Making (Complexity):  Assessment of Occupational Performance: 5 or more Performance Deficits  Occupational Performance Limitations: bathing/showering, dressing, feeding, functional mobility, hygiene and grooming, driving and community mobility, health management and maintenance, home establishment and management, meal preparation and cleanup, shopping, volunteer activities, and leisure activities  Clinical Decision Making (Complexity): Low complexity    PLAN OF CARE  Treatment Interventions:  Interventions: Self-Care/Home Management, Therapeutic Activity    Long Term Goals   OT Goal 1  Goal Identifier: Near Vision  Goal Description: Patient will demonstrate 3 pieces of adaptive equipment and/or adaptive techniques in regards to magnification, lighting, contrast and glare for increased ADL/IADL independence with near vision tasks (reading, meal prep, medication management, writing).  Rationale: In order to maximize safety and independence with performance of self-care activities;In order to safely and appropriately apply  compensatory strategies with ADL/IADL performance  Target Date: 06/13/24  OT Goal 2  Goal Identifier: Environmental modifications  Goal Description: Patient will demonstrate and/or verbalize 2 environmentally-based ADL modifications to improve visual-based ADL/IADL activities.  Rationale: In order to maximize safety and independence with performance of self-care activities;In order to safely and appropriately apply compensatory strategies with ADL/IADL performance  Target Date: 06/13/24  OT Goal 3  Goal Identifier: Resource Education  Goal Description: Patient will verbalize awareness of 2 community resources for increased ADL/IADL completion.  Rationale: In order to maximize safety and independence with performance of self-care activities  Target Date: 06/13/24  OT Goal 4  Goal Identifier: Distance Viewing  Goal Description: Patient will demonstrate increased independence in distance viewing for safety and leisure during ADL/IADLs through independent use of at least one adaptive technique or AE.  Rationale: In order to maximize safety and independence with performance of self-care activities;In order to safely and appropriately apply compensatory strategies with ADL/IADL performance  Target Date: 06/13/24      Frequency of Treatment: 1x/week, decreasing frequency as indicated  Duration of Treatment: 6 months (extended due to potential scheduling conflicts)     Recommended Referrals to Other Professionals:  none  Education Assessment: Learner/Method: Patient;Listening;Reading;Demonstration  Education Comments: To improve understanding of vision loss and impact on ADL, Patient was educated in 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 Education in PRL (Preferred Retinal Locus) - inferior - and how it can increase ability to see all details (read, identify faces, find items,  etc.). Trial Grace to turn off timers/alarms.  Verbal education in head worn AE and telescopic glasses (patient asking about telescopic glasses).  Education in filter options (darker shade of FL-41, sports/wrap around style, solid top, etc.; educated in contrast with mobility; educated in one purpose of white cane (alerts others around him for increased safety)     Risks and benefits of evaluation/treatment have been explained.   Patient/Family/caregiver agrees with Plan of Care.     Evaluation Time:    OT Eval, Low Complexity Minutes (54333): 32    Signing Clinician: LESLY [facility]                                                                                   OUTPATIENT OCCUPATIONAL THERAPY      PLAN OF TREATMENT FOR OUTPATIENT REHABILITATION   Patient's Last Name, First Name, Jefry Garzon YOB: 1950   Provider's Name   [facility]   Medical Record No.  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              Referring Provider:  Lisa Chang    Initial Assessment  See Epic Evaluation- 12/18/23

## 2023-12-28 ENCOUNTER — MYC MEDICAL ADVICE (OUTPATIENT)
Dept: INTERNAL MEDICINE | Facility: CLINIC | Age: 73
End: 2023-12-28
Payer: COMMERCIAL

## 2023-12-28 DIAGNOSIS — L60.0 INGROWN TOENAIL: Primary | ICD-10-CM

## 2023-12-28 NOTE — TELEPHONE ENCOUNTER
Signed Podiatry referral    ESCOBAR See      Appointment Request From: Jefry Hatfield     With Provider: Varinder See [[facility] [place]]     Preferred Date Range: Any     Preferred Times: Any Time     Reason for visit: Request an Appointment     Comments:  'Dr See, I guess you could say this is running related.  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  I am hesitant to do another [employer] job and thought I would seek your advice.  Ex. should I try to make an appointment with liz at [facility], like a podiatrist?  I wasn't sure where to start so I thought I would run this past you.  Let me know what you think.  Jefry Hatfield

## 2024-01-08 ENCOUNTER — OFFICE VISIT (OUTPATIENT)
Dept: PODIATRY | Facility: CLINIC | Age: 74
End: 2024-01-08
Attending: INTERNAL MEDICINE
Payer: COMMERCIAL

## 2024-01-08 VITALS
SYSTOLIC BLOOD PRESSURE: 115 MMHG | HEART RATE: 64 BPM | DIASTOLIC BLOOD PRESSURE: 70 MMHG | HEIGHT: 72 IN | BODY MASS INDEX: 23.73 KG/M2

## 2024-01-08 DIAGNOSIS — L60.0 INGROWN TOENAIL: ICD-10-CM

## 2024-01-08 PROCEDURE — 99213 OFFICE O/P EST LOW 20 MIN: CPT | Performed by: PODIATRIST

## 2024-01-08 NOTE — PROGRESS NOTES
Subjective:    Pt is seen today in consult from Dr. See w/ the c/c of a painful ingrown left second, third, and fourth nail lateral border.  Only bothers patient when he trims nails.  He is a runner.  Completed marathon last year.  No pain when walking or running at all.  Denies erythema edema or drainage.  He uses nail mahendra.      ROS:  see above       No Known Allergies    Current Outpatient Medications   Medication Sig Dispense Refill    CALCIUM-MAG-VIT C-VIT D PO Take by mouth 2 times daily      ibuprofen (ADVIL/MOTRIN) 200 MG capsule Take 200 mg by mouth      nortriptyline (PAMELOR) 25 MG capsule 25 mg daily  2    Omega-3 Fatty Acids (OMEGA-3 FISH OIL PO) Take by mouth daily      Saw Palmetto, Serenoa repens, (SAW PALMETTO EXTRACT PO) Take by mouth daily      tacrolimus (PROTOPIC) 0.1 % external ointment Apply topically 2 times daily 60 g 2    VITAMIN D, CHOLECALCIFEROL, PO Take by mouth daily         Patient Active Problem List   Diagnosis    Vision changes    Senile nuclear sclerosis    Malignant myopia    Pseudophakia of both eyes - Both Eyes    Myopic degeneration    Anisometropia    Xerostomia    Adenomatous polyp of colon       Past Medical History:   Diagnosis Date    Anisometropia     Cataract, right eye     Cervical radiculopathy     High myopia     Myopic degeneration     Posterior staphyloma     LE    Pseudophakia of left eye     Retinal detachment RE 1971,[id number]    BE       Past Surgical History:   Procedure Laterality Date    APPENDECTOMY      CATARACT IOL, RT/LT Bilateral 11/14/12LE 7/1/14RE    BE    COLONOSCOPY N/A 5/25/2018    Procedure: COLONOSCOPY;  Screening Colonoscopy;  Surgeon: Zak Kaplan MD;  [facility]    COLONOSCOPY N/A 7/3/2023    Procedure: COLONOSCOPY, WITH POLYPECTOMY AND BIOPSY;  Surgeon: Nancy Silvestre MD;  [facility]    PHACOEMULSIFICATION CLEAR CORNEA WITH STANDARD INTRAOCULAR LENS IMPLANT  7/1/2014    Procedure: PHACOEMULSIFICATION CLEAR CORNEA  "WITH STANDARD INTRAOCULAR LENS IMPLANT;  Surgeon: Milan Bernardo MD;  [facility]    SCLERAL BUCKLE  1971    RE    SCLERAL BUCKLE  1980    LE    YAG CAPSULOTOMY OD (RIGHT EYE)  10/30/2014       Family History   Problem Relation Age of Onset    Dementia Mother     Heart Failure Father     Asthma Father     Diabetes Paternal Grandfather     Cancer No family hx of     Glaucoma No family hx of     Macular Degeneration No family hx of     Skin Cancer No family hx of     Melanoma No family hx of        Social History     Tobacco Use    Smoking status: Never     Passive exposure: Never    Smokeless tobacco: Never   Substance Use Topics    Alcohol use: Yes     Comment: occaisonal          Exam:    Vitals: /70   Pulse 64   Ht 1.829 m (6')   BMI 23.73 kg/m    BMI: Body mass index is 23.73 kg/m .  Height: 6' 0\"    Constitutional/ general:  Pt is in no apparent distress, appears well-nourished.  Cooperative with history and physical exam.     Psych:  The patient answered questions appropriately.  Normal affect.  Seems to have reasonable expectations, in terms of treatment.     Lungs:  Non labored breathing, non labored speech. No cough.  No audible wheezing. Even, quiet breathing.       Vascular:  positive pedal pulses bilaterally.  CFT < 3 sec.  positive ankle edema.  positive pedal hair growth.    Neuro:  Alert and oriented x 3. Coordinated gait.  Light touch sensation is intact     Derm: Normal texture and turgor.  No erythema, ecchymosis, or cyanosis.      Musculoskeletal:   Pronated arch.  Patient has long left second third and fourth toes.  Nails are normal.  Lateral border slightly ingrown and skin thickened.  No erythema or drainage or pain on palpation at this time.       ASSESSMENT:    Onychocryptosis with paronychia aforementioned toes.    Reviewed past podiatry and primary care notes.  Discussed with patient that these toes being long sometimes have more pressure causing callusing and more ingrown " nails.  Will try to wear shoes that do not fit these toes.  He has no pain with activities at all.  No erythema or drainage or need for procedure at this time.  Discussed using a  to see if this helps trim his nails easier.  Will also refer to foot care nurse.  RTC as needed.  Thank you for allowing me participate in the care of this patient.        Parish Neves, YVONNE, FACFAS

## 2024-01-08 NOTE — PATIENT INSTRUCTIONS
We wish you continued good healing. If you have any questions or concerns, please do not hesitate to contact us at  [phone]    [employer] (secure e-mail communication and access to your chart) to send a message or to make an appointment.    Please remember to call and schedule a follow up appointment if one was recommended at your earliest convenience.     PODIATRY CLINIC HOURS  TELEPHONE NUMBER    Dr. Parish CODYPSCOTTY FACFAS        Clinics:  [facility]   [place]  Tuesday 1PM-6PM  [place]  Wednesday 745AM-330PM  [place]  Monday 2nd,4th  830AM-4PM  Thursday/Friday 745AM-230PM     [place] APPOINTMENTS  [phone]    [place] APPOINTMENTS  [phone]          If you need a medication refill, please contact us you may need lab work and/or a follow up visit prior to your refill (i.e. Antifungal medications).  If MRI needed please call Imaging at [phone]   HOW DO I GET MY KNEE SCOOTER? Knee scooters can be picked up at ANY Medical Supply stores with your knee scooter Prescription.  OR  Bring your signed prescription to an [facility] showroom.   Set up an appointment for your custom Orthotics. Call any Orthotics locations call [phone]

## 2024-01-08 NOTE — LETTER
1/8/2024         RE: Jefry Hatfield  [address]  [place] [address]        Dear Colleague,    Thank you for referring your patient, Jefry Hatfield, to the [facility]. Please see a copy of my visit note below.    Subjective:    Pt is seen today in consult from Dr. See w/ the c/c of a painful ingrown left second, third, and fourth nail lateral border.  Only bothers patient when he trims nails.  He is a runner.  Completed marathon last year.  No pain when walking or running at all.  Denies erythema edema or drainage.  He uses nail mahendra.      ROS:  see above       No Known Allergies    Current Outpatient Medications   Medication Sig Dispense Refill     CALCIUM-MAG-VIT C-VIT D PO Take by mouth 2 times daily       ibuprofen (ADVIL/MOTRIN) 200 MG capsule Take 200 mg by mouth       nortriptyline (PAMELOR) 25 MG capsule 25 mg daily  2     Omega-3 Fatty Acids (OMEGA-3 FISH OIL PO) Take by mouth daily       Saw Palmetto, Serenoa repens, (SAW PALMETTO EXTRACT PO) Take by mouth daily       tacrolimus (PROTOPIC) 0.1 % external ointment Apply topically 2 times daily 60 g 2     VITAMIN D, CHOLECALCIFEROL, PO Take by mouth daily         Patient Active Problem List   Diagnosis     Vision changes     Senile nuclear sclerosis     Malignant myopia     Pseudophakia of both eyes - Both Eyes     Myopic degeneration     Anisometropia     Xerostomia     Adenomatous polyp of colon       Past Medical History:   Diagnosis Date     Anisometropia      Cataract, right eye      Cervical radiculopathy      High myopia      Myopic degeneration      Posterior staphyloma     LE     Pseudophakia of left eye      Retinal detachment RE 1971,[id number]    BE       Past Surgical History:   Procedure Laterality Date     APPENDECTOMY       CATARACT IOL, RT/LT Bilateral 11/14/12LE 7/1/14RE    BE     COLONOSCOPY N/A 5/25/2018    Procedure: COLONOSCOPY;  Screening Colonoscopy;  Surgeon: Harris Mcclelland,  "MD Zak;  [facility]     COLONOSCOPY N/A 7/3/2023    Procedure: COLONOSCOPY, WITH POLYPECTOMY AND BIOPSY;  Surgeon: Nancy Silvestre MD;  [place] [facility]     PHACOEMULSIFICATION CLEAR CORNEA WITH STANDARD INTRAOCULAR LENS IMPLANT  7/1/2014    Procedure: PHACOEMULSIFICATION CLEAR CORNEA WITH STANDARD INTRAOCULAR LENS IMPLANT;  Surgeon: Milan Bernardo MD;  [facility]     SCLERAL BUCKLE  1971    RE     SCLERAL BUCKLE  1980    LE     YAG CAPSULOTOMY OD (RIGHT EYE)  10/30/2014       Family History   Problem Relation Age of Onset     Dementia Mother      Heart Failure Father      Asthma Father      Diabetes Paternal Grandfather      Cancer No family hx of      Glaucoma No family hx of      Macular Degeneration No family hx of      Skin Cancer No family hx of      Melanoma No family hx of        Social History     Tobacco Use     Smoking status: Never     Passive exposure: Never     Smokeless tobacco: Never   Substance Use Topics     Alcohol use: Yes     Comment: occaisonal          Exam:    Vitals: /70   Pulse 64   Ht 1.829 m (6')   BMI 23.73 kg/m    BMI: Body mass index is 23.73 kg/m .  Height: 6' 0\"    Constitutional/ general:  Pt is in no apparent distress, appears well-nourished.  Cooperative with history and physical exam.     Psych:  The patient answered questions appropriately.  Normal affect.  Seems to have reasonable expectations, in terms of treatment.     Lungs:  Non labored breathing, non labored speech. No cough.  No audible wheezing. Even, quiet breathing.       Vascular:  positive pedal pulses bilaterally.  CFT < 3 sec.  positive ankle edema.  positive pedal hair growth.    Neuro:  Alert and oriented x 3. Coordinated gait.  Light touch sensation is intact     Derm: Normal texture and turgor.  No erythema, ecchymosis, or cyanosis.      Musculoskeletal:   Pronated arch.  Patient has long left second third and fourth toes.  Nails are normal.  Lateral border slightly ingrown and " skin thickened.  No erythema or drainage or pain on palpation at this time.       ASSESSMENT:    Onychocryptosis with paronychia aforementioned toes.    Reviewed past podiatry and primary care notes.  Discussed with patient that these toes being long sometimes have more pressure causing callusing and more ingrown nails.  Will try to wear shoes that do not fit these toes.  He has no pain with activities at all.  No erythema or drainage or need for procedure at this time.  Discussed using a  to see if this helps trim his nails easier.  Will also refer to foot care nurse.  RTC as needed.  Thank you for allowing me participate in the care of this patient.        Parish Neves DPM, FACFAS        Again, thank you for allowing me to participate in the care of your patient.        Sincerely,        Parish Neves DPM

## 2024-03-11 ENCOUNTER — MYC MEDICAL ADVICE (OUTPATIENT)
Dept: DERMATOLOGY | Facility: CLINIC | Age: 74
End: 2024-03-11

## 2024-03-11 ENCOUNTER — THERAPY VISIT (OUTPATIENT)
Dept: OCCUPATIONAL THERAPY | Facility: CLINIC | Age: 74
End: 2024-03-11
Attending: OPHTHALMOLOGY
Payer: COMMERCIAL

## 2024-03-11 DIAGNOSIS — H15.833 POSTERIOR STAPHYLOMA, BILATERAL: Primary | ICD-10-CM

## 2024-03-11 DIAGNOSIS — L28.0 LICHEN SIMPLEX CHRONICUS: ICD-10-CM

## 2024-03-11 PROCEDURE — 97535 SELF CARE MNGMENT TRAINING: CPT | Mod: GO | Performed by: OCCUPATIONAL THERAPIST

## 2024-03-12 ENCOUNTER — TELEPHONE (OUTPATIENT)
Dept: ORTHOPEDICS | Facility: CLINIC | Age: 74
End: 2024-03-12
Payer: COMMERCIAL

## 2024-03-12 RX ORDER — TACROLIMUS 1 MG/G
OINTMENT TOPICAL 2 TIMES DAILY
Qty: 60 G | Refills: 2 | Status: SHIPPED | OUTPATIENT
Start: 2024-03-12

## 2024-03-16 ENCOUNTER — OFFICE VISIT (OUTPATIENT)
Dept: ORTHOPEDICS | Facility: CLINIC | Age: 74
End: 2024-03-16
Payer: COMMERCIAL

## 2024-03-16 DIAGNOSIS — M25.551 GREATER TROCHANTERIC PAIN SYNDROME OF RIGHT LOWER EXTREMITY: Primary | ICD-10-CM

## 2024-03-16 DIAGNOSIS — M53.3 PAIN OF RIGHT SACROILIAC JOINT: ICD-10-CM

## 2024-03-16 PROCEDURE — 99213 OFFICE O/P EST LOW 20 MIN: CPT | Performed by: FAMILY MEDICINE

## 2024-03-16 NOTE — PROGRESS NOTES
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 Yes down lateral leg    Weakness? Yes and no    Flexion or Extension bias: No    Red flags? NO    Meds tried? No    PT? Yes    Imaging? Xr of back in 2019       Per my last note:  (M25.562,  G89.29) Chronic pain of left knee  (primary encounter diagnosis)  Comment:   Plan: reassuring exam today w/o features of a stress fx; recommended he re-engage w/ [place] in PT and resume training program; if concerning features arise, he will contact me for additional imaging; precautions given; f/u prn     (M21.41,  M21.42) Pes planus of both feet  Comment: referral to podiatry for new orthotics as he has used current pair for several years  Plan: Orthopedic  Referral      Past Medical History:   Diagnosis Date    Anisometropia     Cataract, right eye     Cervical radiculopathy     High myopia     Myopic degeneration     Posterior staphyloma     LE    Pseudophakia of left eye     Retinal detachment RE 1971,[id number]    BE      Current Outpatient Medications   Medication Sig Dispense Refill    CALCIUM-MAG-VIT C-VIT D PO Take by mouth 2 times daily      ibuprofen (ADVIL/MOTRIN) 200 MG capsule Take 200 mg by mouth      nortriptyline (PAMELOR) 25 MG capsule 25 mg daily  2    Omega-3 Fatty Acids (OMEGA-3 FISH  OIL PO) Take by mouth daily      Saw Palmetto, Serenoa repens, (SAW PALMETTO EXTRACT PO) Take by mouth daily      tacrolimus (PROTOPIC) 0.1 % external ointment APPLY TOPICALLY 2 TIMES DAILY 60 g 2    VITAMIN D, CHOLECALCIFEROL, PO Take by mouth daily        No Known Allergies   ROS:   Gen- no fevers/chills   Rheum - no morning stiffness   Derm - no rash/ redness   Neuro - no numbness, no tingling   Remainder of ROS negative.     Exam:     BACK/ R HIP:   ROM: lumbar - flexion -full /extension -full /lateral rotation- full /side bend- full; R hip - full ROM in ER/ER/abd/add/flex/ext  Bony tenderness: +R SI; +TTP of R trochanter  Paraspinal tenderness: None.   Sensation: intact in b/l lower extremities.   Strength: 5/5 w/ dorsiflexion/ plantarflexion/ inversion/ eversion/ knee flexion/ extension.   Maneuvers: Neg straight leg raise b/l. Neg Slump b/l POS ZOLTAN to R SI  Neuro: Neg Clonus

## 2024-03-16 NOTE — LETTER
3/16/2024      RE: Jefry Hatfield  [address]  [place] [address]     Dear Colleague,    Thank you for referring your patient, Jefry Hatfield, to the [facility] [place]. Please see a copy of my visit note below.    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 Yes down lateral leg    Weakness? Yes and no    Flexion or Extension bias: No    Red flags? NO    Meds tried? No    PT? Yes    Imaging? Xr of back in 2019       Per my last note:  (M25.562,  G89.29) Chronic pain of left knee  (primary encounter diagnosis)  Comment:   Plan: reassuring exam today w/o features of a stress fx; recommended he re-engage w/ [place] in PT and resume training program; if concerning features arise, he will contact me for additional imaging; precautions given; f/u prn     (M21.41,  M21.42) Pes planus of both feet  Comment: referral to podiatry for new orthotics as he has used current pair for several years  Plan: Orthopedic  Referral      Past Medical History:   Diagnosis Date     Anisometropia      Cataract, right eye      Cervical radiculopathy      High myopia      Myopic degeneration      Posterior staphyloma     LE     Pseudophakia of left eye      Retinal detachment RE 1971,LE1980    BE       Current Outpatient Medications   Medication Sig Dispense Refill     CALCIUM-MAG-VIT C-VIT D PO Take by mouth 2 times daily       ibuprofen (ADVIL/MOTRIN) 200 MG capsule Take 200 mg by mouth       nortriptyline (PAMELOR) 25 MG capsule 25 mg daily  2     Omega-3 Fatty Acids (OMEGA-3 FISH OIL PO) Take by mouth daily       Saw Palmetto, Serenoa repens, (SAW PALMETTO EXTRACT PO) Take by mouth daily       tacrolimus (PROTOPIC) 0.1 % external ointment APPLY TOPICALLY 2 TIMES DAILY 60 g 2     VITAMIN D, CHOLECALCIFEROL, PO Take by mouth daily        No Known Allergies   ROS:   Gen- no fevers/chills   Rheum - no morning stiffness   Derm - no rash/ redness   Neuro - no numbness, no tingling   Remainder of ROS negative.     Exam:     BACK/ R HIP:   ROM: lumbar - flexion -full /extension -full /lateral rotation- full /side bend- full; R hip - full ROM in ER/ER/abd/add/flex/ext  Bony tenderness: +R SI; +TTP of R trochanter  Paraspinal tenderness: None.   Sensation: intact in b/l lower extremities.   Strength: 5/5 w/ dorsiflexion/ plantarflexion/ inversion/ eversion/ knee flexion/ extension.   Maneuvers: Neg straight leg raise b/l. Neg Slump b/l POS ZOLTAN to R SI  Neuro: Neg Clonus           Again, thank you for allowing me to participate in the care of your patient.      Sincerely,    Danish Butt MD

## 2024-03-18 ASSESSMENT — ACTIVITIES OF DAILY LIVING (ADL)
TRAVELING: I CAN TRAVEL ANYWHERE BUT IT GIVES ME ADDITIONAL PAIN.
I_HAVE_HAD_PAIN_IN_THE_SHOULDER_OR_NECK_AT_SOME_TIME_IN_THE_LAST_2_WEEKS: DISAGREE
HEAVY_WORK: SLIGHT DIFFICULTY
SPORTS_COUNT: 9
WALKING_15_MINUTES_OR_GREATER: NO DIFFICULTY AT ALL
HOW_BOTHERSOME_HAS_YOUR_BACK_PAIN_BEEN_IN_THE_LAST_2_WEEKS: SLIGHTLY
PLEASE_INDICATE_YOR_PRIMARY_REASON_FOR_REFERRAL_TO_THERAPY:: HIP
KEELE_TOTAL_SCORE: 1
TWISTING/PIVOTING ON INVOLVED LEG: SLIGHT DIFFICULTY
HEAVY_WORK: SLIGHT DIFFICULTY
PAIN_INTENSITY: THE PAIN IS VERY MILD AT THE MOMENT.
WALKING_15_MINUTES_OR_GREATER: NO DIFFICULTY AT ALL
GOING_DOWN_1_FLIGHT_OF_STAIRS: NO DIFFICULTY AT ALL
PLEASE_INDICATE_YOR_PRIMARY_REASON_FOR_REFERRAL_TO_THERAPY:: LOWER BACK, MID BACK, AND/OR SACRUM
WALKING_DOWN_STEEP_HILLS: MODERATE DIFFICULTY
PERSONAL_CARE: I CAN LOOK AFTER MYSELF NORMALLY WITHOUT CAUSING ADDITIONAL PAIN.
JUMPING: SLIGHT DIFFICULTY
ROLLING_OVER_IN_BED: NO DIFFICULTY AT ALL
SEX_LIFE_(IF_APPLICABLE): MY SEX LIFE IS NORMAL AND CAUSES NO ADDITIONAL PAIN.
RECREATIONAL ACTIVITIES: NO DIFFICULTY AT ALL
ABILITY_TO_PARTICIPATE_IN_YOUR_DESIRED_SPORT_AS_LONG_AS_YOU_WOULD_LIKE: SLIGHT DIFFICULTY
LIGHT_TO_MODERATE_WORK: NO DIFFICULTY AT ALL
KEELE ASSESSMENT OF PARTICIPATION_SUB_SCORE_(Q5-9): 1
TWISTING/PIVOTING_ON_INVOLVED_LEG: SLIGHT DIFFICULTY
ADL_HIGHEST_POTENTIAL_SCORE: 64
I_HAVE_ONLY_WALKED_SHORT_DISTANCES_BECAUSE_OF_MY_BACK_PAIN: DISAGREE
WALKING_DOWN_STEEP_HILLS: MODERATE DIFFICULTY
COMPUTED_OSWESTRY_SCORE: 11.11
SPORTS_TOTAL_ITEM_SCORE: 0
HOS_ADL_HIGHEST_POTENTIAL_SCORE: 64
OSWESTRY DISABILITY INDEX_TOTAL_SCORE: 5
SITTING: I CAN SIT IN MY FAVORITE CHAIR AS LONG AS I LIKE.
STANDING FOR 15 MINUTES: NO DIFFICULTY AT ALL
STARTING_AND_STOPPING_QUICKLY: NO DIFFICULTY AT ALL
SOCIAL_LIFE: MY SOCIAL LIFE IS NORMAL AND CAUSES ME NO ADDITIONAL PAIN.
SPORTS_SCORE(%): 0
DEEP SQUATTING: NO DIFFICULTY AT ALL
MY_BACK_PAIN_HAS_SPREAD_DOWN_MY_LEG(S)_AT_SOME_TIME_IN_THE_LAST_2_WEEKS: DISAGREE
GETTING INTO AND OUT OF AN AVERAGE CAR: NO DIFFICULTY AT ALL
LIFTING: I CAN LIFT HEAVY WEIGHTS BUT IT GIVES ME ADDITIONAL PAIN.
SECTION_4-WALKING: PAIN DOES NOT PREVENT ME FROM WALKING ANY DISTANCE.
LOW_IMPACT_ACTIVITIES_LIKE_FAST_WALKING: NO DIFFICULTY AT ALL
GETTING_INTO_AND_OUT_OF_AN_AVERAGE_CAR: NO DIFFICULTY AT ALL
ABILITY_TO_PERFORM_ACTIVITY_WITH_YOUR_NORMAL_TECHNIQUE: NO DIFFICULTY AT ALL
STANDING: I CAN STAND AS LONG AS I WANT BUT IT GIVES ME ADDITIONAL PAIN.
IN_THE_LAST_2_WEEKS_I_HAVE_DRESSED_MORE_SLOWLY_THAN_USUAL_BECAUSE_OF_BACK_PAIN: DISAGREE
WALKING_UP_STEEP_HILLS: NO DIFFICULTY AT ALL
IN_GENERAL_I_HAVE_NOT_ENJOYED_ALL_THE_THINGS_I_USED_TO_ENJOY: DISAGREE
WALKING_INITIALLY: NO DIFFICULTY AT ALL
WALKING_UP_STEEP_HILLS: NO DIFFICULTY AT ALL
WORRYING_THOUGHTS_HAVE_BEEN_GOING_THROUGH_MY_MIND_A_LOT_OF_THE_TIME: AGREE
OSWESTRY_DISABILITY_INDEX:_COUNT: 9
GOING DOWN 1 FLIGHT OF STAIRS: NO DIFFICULTY AT ALL
LIGHT_TO_MODERATE_WORK: NO DIFFICULTY AT ALL
WALKING_APPROXIMATELY_10_MINUTES: NO DIFFICULTY AT ALL
GETTING_INTO_AND_OUT_OF_A_BATHTUB: NO DIFFICULTY AT ALL
HOW_WOULD_YOU_RATE_YOUR_CURRENT_LEVEL_OF_FUNCTION_DURING_YOUR_SPORTS_RELATED_ACTIVITIES_FROM_0_TO_100_WITH_100_BEING_YOUR_LEVEL_OF_FUNCTION_PRIOR_TO_YOUR_HIP_PROBLEM_AND_0_BEING_THE_INABILITY_TO_PERFORM_ANY_OF_YOUR_USUAL_DAILY_ACTIVITIES?: 85
ADL_TOTAL_ITEM_SCORE: INCOMPLETE
SITTING_FOR_15_MINUTES: NO DIFFICULTY AT ALL
HOS_ADL_SCORE(%): 92.19
STEPPING_UP_AND_DOWN_CURBS: SLIGHT DIFFICULTY
SPORTS_HIGHEST_POTENTIAL_SCORE: 36
GETTING_INTO_AND_OUT_OF_A_BATHTUB: NO DIFFICULTY AT ALL
ROLLING OVER IN BED: NO DIFFICULTY AT ALL
LANDING: NO DIFFICULTY AT ALL
DEEP_SQUATTING: NO DIFFICULTY AT ALL
STEPPING UP AND DOWN CURBS: SLIGHT DIFFICULTY
CUTTING/LATERAL_MOVEMENTS: NO DIFFICULTY AT ALL
ADL_SCORE(%): INCOMPLETE
SWINGING_OBJECTS_LIKE_A_GOLF_CLUB: SLIGHT DIFFICULTY
HOW_WOULD_YOU_RATE_YOUR_CURRENT_LEVEL_OF_FUNCTION?: NEARLY NORMAL
RECREATIONAL_ACTIVITIES: NO DIFFICULTY AT ALL
WALKING_INITIALLY: NO DIFFICULTY AT ALL
SITTING FOR 15 MINUTES: NO DIFFICULTY AT ALL
ADL_COUNT: 16
STANDING_FOR_15_MINUTES: NO DIFFICULTY AT ALL
RUNNING_ONE_MILE: NO DIFFICULTY AT ALL
HOS_ADL_ITEM_SCORE_TOTAL: 59
WALKING_FOR_APPROXIMATELY_10_MINUTES: NO DIFFICULTY AT ALL

## 2024-03-19 ENCOUNTER — THERAPY VISIT (OUTPATIENT)
Dept: PHYSICAL THERAPY | Facility: CLINIC | Age: 74
End: 2024-03-19
Attending: FAMILY MEDICINE
Payer: COMMERCIAL

## 2024-03-19 DIAGNOSIS — M25.551 GREATER TROCHANTERIC PAIN SYNDROME OF RIGHT LOWER EXTREMITY: ICD-10-CM

## 2024-03-19 DIAGNOSIS — M53.3 PAIN OF RIGHT SACROILIAC JOINT: ICD-10-CM

## 2024-03-19 PROCEDURE — 97161 PT EVAL LOW COMPLEX 20 MIN: CPT | Mod: GP | Performed by: PHYSICAL THERAPIST

## 2024-03-19 PROCEDURE — 97112 NEUROMUSCULAR REEDUCATION: CPT | Mod: GP | Performed by: PHYSICAL THERAPIST

## 2024-03-19 NOTE — PROGRESS NOTES
PHYSICAL THERAPY EVALUATION  Type of Visit: Evaluation    See electronic medical record for Abuse and Falls Screening details.    Subjective  patient insidous onset of recurrent right SI joint and recurrent lateral right hip pain on 11-1-2023.  Pain worse in the morning, better with stretching Occupation: Retired.       Presenting condition or subjective complaint: He is    Date of onset: 11/01/23    Relevant medical history: unremarkable     Dates & types of surgery:      Prior diagnostic imaging/testing results:       Prior therapy history for the same diagnosis, illness or injury:        Prior Level of Function  Transfers: Independent  Ambulation: Independent  ADL: Independent  IADL:  walking     Living Environment  Social support:     Type of home:     Stairs to enter the home:         Ramp:     Stairs inside the home:         Help at home:    Equipment owned:       Employment:      Hobbies/Interests:      Patient goals for therapy:      Pain assessment:  see objective      Objective   HIP EVALUATION  PAIN: right lateral hip and sacroiliac pain, intermittent pain, pain ( hip): 2/10, sacroiliac joint: 1/10, better with stretching,   INTEGUMENTARY (edema, incisions): na  POSTURE:   GAIT:   Weightbearing Status:  FWB  Assistive Device(s): non  Gait Deviations: WNL  BALANCE/PROPRIOCEPTION:  single leg balance: 2 seconds (L), 1 seconds (R)   WEIGHTBEARING ALIGNMENT:  na  NON-WEIGHTBEARING ALIGNMENT: na   ROM:   (Degrees) Left AROM Left PROM  Right AROM Right PROM   Hip Flexion 115  115    Hip Extension 10  10    Hip Abduction 15  15    Hip Adduction       Hip Internal Rotation 12  14    Hip External Rotation 28  30    Knee Flexion wnl  wnl    Knee Extension wnl  wnl    Lumbar Side glide LSB: 50% RSB: 50%   Lumbar Flexion 90%   Lumbar Extension 75%   Pain:   End feel:     PELVIC/SI SCREEN:  forward bend test positive standing, right posterior psis  STRENGTH:  mmt: gluteus medius: 3+/5 (B) poor gluteus ted  contraction (B) hip extension:  3+/5 (B), quadriceps: 5/5 (B), gastrocnemius: 3/5 (B)    LE FLEXIBILITY:  Hamstring flexibility: 80 degrees (B)   SPECIAL TESTS:  na  FUNCTIONAL TESTS:  na  PALPATION:  right greater than left ITB tightness  JOINT MOBILITY: not assessed     Assessment & Plan   CLINICAL IMPRESSIONS  Medical Diagnosis: right SIJ pain, right greater trochanter pain    Treatment Diagnosis: right SIJ pain, right greater trochanter pain   Impression/Assessment: Patient is a 73 year old male with right hip and sacroiliac complaints.  The following significant findings have been identified: Pain, Decreased ROM/flexibility, Decreased joint mobility, Decreased strength, Impaired balance, Impaired muscle performance, and Decreased activity tolerance. These impairments interfere with their ability to perform  walking  as compared to previous level of function.     Clinical Decision Making (Complexity):  Clinical Presentation: Stable/Uncomplicated  Clinical Presentation Rationale: based on medical and personal factors listed in PT evaluation  Clinical Decision Making (Complexity): Low complexity    PLAN OF CARE  Treatment Interventions:  Modalities: Cryotherapy  Interventions: Manual Therapy, Neuromuscular Re-education, Therapeutic Activity, Therapeutic Exercise, Self-Care/Home Management    Long Term Goals     PT Goal 1  Goal Identifier: walking  Goal Description: walking tolerance 30 minutes pain free, currently pain level 2/10  Rationale:  (walking in community pain free)  Target Date: 05/21/24      Frequency of Treatment: 1x/week  Duration of Treatment: 9 weeks    Recommended Referrals to Other Professionals: none needed   Education Assessment:   Learner/Method: Patient  Education Comments: PT instructed in exercise program    Risks and benefits of evaluation/treatment have been explained.   Patient/Family/caregiver agrees with Plan of Care.     Evaluation Time:     PT Eval, Low Complexity Minutes (55121):  25       Signing Clinician: Jing Osuna, PT      [facility]                                                                                   OUTPATIENT PHYSICAL THERAPY      PLAN OF TREATMENT FOR OUTPATIENT REHABILITATION   Patient's Last Name, First Name, LITOTrenaROSAURATrena  Jefry Hatfield YOB: 1950   Provider's Name   [facility]   Medical Record No.  [id number]     Onset Date: 11/01/23  Start of Care Date: 03/19/24     Medical Diagnosis:  right SIJ pain, right greater trochanter pain      PT Treatment Diagnosis:  right SIJ pain, right greater trochanter pain Plan of Treatment  Frequency/Duration: 1x/week/ 9 weeks    Certification date from 03/19/24 to 05/21/24         See note for plan of treatment details and functional goals     Jing Osuna, PT                         I CERTIFY THE NEED FOR THESE SERVICES FURNISHED UNDER        THIS PLAN OF TREATMENT AND WHILE UNDER MY CARE     (Physician attestation of this document indicates review and certification of the therapy plan).              Referring Provider:  Danish Butt    Initial Assessment  See Epic Evaluation- Start of Care Date: 03/19/24

## 2024-03-21 NOTE — PROGRESS NOTES
[facility]                                                                                   OUTPATIENT PHYSICAL THERAPY    PLAN OF TREATMENT FOR OUTPATIENT REHABILITATION   Patient's Last Name, First Name, Jefry Garzon YOB: 1950   Provider's Name   [facility]   Medical Record No.  [id number]     Onset Date: 11/01/23  Start of Care Date: 03/19/24     Medical Diagnosis:  right SIJ pain, right greater trochanter pain      PT Treatment Diagnosis:  right SIJ pain, right greater trochanter pain Plan of Treatment  Frequency/Duration: 1x/week/ 9 weeks    Certification date from 03/19/24 to 05/21/24         See note for plan of treatment details and functional goals     Jing Osuna, PT                          03/19/24 0500   Appointment Info   Signing clinician's name / credentials Jing Osuna   Total/Authorized Visits 8   Visits Used 1   Medical Diagnosis right SIJ pain, right greater trochanter pain   PT Tx Diagnosis right SIJ pain, right greater trochanter pain   Precautions/Limitations none   Other pertinent information GOAL: walking   Quick Adds Certification   Progress Note/Certification   Start of Care Date 03/19/24   Onset of illness/injury or Date of Surgery 11/01/23   Therapy Frequency 1x/week   Predicted Duration 9 weeks   Certification date from 03/19/24   Certification date to 05/21/24   Progress Note Due Date 05/17/24   PT Goal 1   Goal Identifier walking   Goal Description walking tolerance 30 minutes pain free, currently pain level 2/10   Rationale   (walking in community pain free)   Target Date 05/21/24   Subjective Report   Subjective Report see ie   Treatment Interventions (PT)   Interventions Neuromuscular Re-education   Neuromuscular Re-education   Neuromuscular re-ed of mvmt, balance, coord, kinesthetic sense, posture, proprioception minutes (01273) 8   Skilled Intervention to improve balance  and glut max muscle activation   Patient Response/Progress pt tolerated well   PTRx Neuro Re-ed 1 Balance Single Leg Stance Supported and Unsupported   PTRx Neuro Re-ed 1 - Details 30 sec x 3  eyes open    PTRx Neuro Re-ed 2 Glueteus Roland Re Training   PTRx Neuro Re-ed 2 - Details tighten abds & glut on side of emphasis.. Place pillow under hips as option. Straighten knee and hold 5 seconds while toes remain on mat .   Lower  knee  back to mat.-10 reps each side v   Eval/Assessments   PT Eval, Low Complexity Minutes (41091) 25   Education   Learner/Method Patient   Education Comments PT instructed in exercise program   Plan   Home program See PTRX   Total Session Time   Timed Code Treatment Minutes 8   Total Treatment Time (sum of timed and untimed services) 33       I CERTIFY THE NEED FOR THESE SERVICES FURNISHED UNDER        THIS PLAN OF TREATMENT AND WHILE UNDER MY CARE     (Physician attestation of this document indicates review and certification of the therapy plan).              Referring Provider:  Danish Butt    Initial Assessment  See Epic Evaluation- Start of Care Date: 03/19/24

## 2024-03-25 ENCOUNTER — THERAPY VISIT (OUTPATIENT)
Dept: OCCUPATIONAL THERAPY | Facility: CLINIC | Age: 74
End: 2024-03-25
Attending: OPHTHALMOLOGY
Payer: COMMERCIAL

## 2024-03-25 DIAGNOSIS — H15.833 POSTERIOR STAPHYLOMA, BILATERAL: Primary | ICD-10-CM

## 2024-03-25 PROCEDURE — 97535 SELF CARE MNGMENT TRAINING: CPT | Mod: GO | Performed by: OCCUPATIONAL THERAPIST

## 2024-04-01 ENCOUNTER — THERAPY VISIT (OUTPATIENT)
Dept: OCCUPATIONAL THERAPY | Facility: CLINIC | Age: 74
End: 2024-04-01
Attending: OPHTHALMOLOGY
Payer: COMMERCIAL

## 2024-04-01 DIAGNOSIS — H15.833 POSTERIOR STAPHYLOMA, BILATERAL: Primary | ICD-10-CM

## 2024-04-01 PROCEDURE — 97535 SELF CARE MNGMENT TRAINING: CPT | Mod: GO | Performed by: OCCUPATIONAL THERAPIST

## 2024-04-03 ENCOUNTER — THERAPY VISIT (OUTPATIENT)
Dept: PHYSICAL THERAPY | Facility: CLINIC | Age: 74
End: 2024-04-03
Attending: FAMILY MEDICINE
Payer: COMMERCIAL

## 2024-04-03 DIAGNOSIS — M25.551 GREATER TROCHANTERIC PAIN SYNDROME OF RIGHT LOWER EXTREMITY: Primary | ICD-10-CM

## 2024-04-03 PROCEDURE — 97112 NEUROMUSCULAR REEDUCATION: CPT | Mod: GP | Performed by: PHYSICAL THERAPIST

## 2024-04-03 PROCEDURE — 97140 MANUAL THERAPY 1/> REGIONS: CPT | Mod: GP | Performed by: PHYSICAL THERAPIST

## 2024-04-15 ENCOUNTER — THERAPY VISIT (OUTPATIENT)
Dept: OCCUPATIONAL THERAPY | Facility: CLINIC | Age: 74
End: 2024-04-15
Attending: OPHTHALMOLOGY
Payer: COMMERCIAL

## 2024-04-15 DIAGNOSIS — H15.833 POSTERIOR STAPHYLOMA, BILATERAL: Primary | ICD-10-CM

## 2024-04-15 PROCEDURE — 97535 SELF CARE MNGMENT TRAINING: CPT | Mod: GO | Performed by: OCCUPATIONAL THERAPIST

## 2024-04-16 ENCOUNTER — THERAPY VISIT (OUTPATIENT)
Dept: PHYSICAL THERAPY | Facility: CLINIC | Age: 74
End: 2024-04-16
Attending: FAMILY MEDICINE
Payer: COMMERCIAL

## 2024-04-16 DIAGNOSIS — M25.551 GREATER TROCHANTERIC PAIN SYNDROME OF RIGHT LOWER EXTREMITY: Primary | ICD-10-CM

## 2024-04-16 PROCEDURE — 97112 NEUROMUSCULAR REEDUCATION: CPT | Mod: GP | Performed by: PHYSICAL THERAPIST

## 2024-04-16 PROCEDURE — 97140 MANUAL THERAPY 1/> REGIONS: CPT | Mod: GP | Performed by: PHYSICAL THERAPIST

## 2024-04-22 ENCOUNTER — THERAPY VISIT (OUTPATIENT)
Dept: OCCUPATIONAL THERAPY | Facility: CLINIC | Age: 74
End: 2024-04-22
Attending: OPHTHALMOLOGY
Payer: COMMERCIAL

## 2024-04-22 DIAGNOSIS — H15.833 POSTERIOR STAPHYLOMA, BILATERAL: Primary | ICD-10-CM

## 2024-04-22 PROCEDURE — 97535 SELF CARE MNGMENT TRAINING: CPT | Mod: GO | Performed by: OCCUPATIONAL THERAPIST

## 2024-04-22 NOTE — PROGRESS NOTES
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   OT Tx Diagnosis decreased ADL/IADL independence   Precautions/Limitations falls precaution due to low vision   Quick Add  Certification   Progress Note/Certification   Start Of Care Date 12/18/23   Onset of Illness/Injury or Date of Surgery 09/27/23   Therapy Frequency 1x/week, decreasing frequency as indicated   Predicted Duration 3 more months (extended due to potential scheduling conflicts)   Certification date from 03/17/24   Certification date to 06/13/24   Progress Note Due Date 06/13/24   Progress Note Completed Date 12/18/23   Goals   OT Goals 1;2;3;4   OT Goal 1   Goal Identifier Near Vision   Goal Description Patient will demonstrate 3 pieces of adaptive equipment and/or adaptive techniques in regards to magnification, lighting, contrast and glare for increased ADL/IADL independence with near vision tasks (reading, meal prep, medication management, writing).   Rationale In order to maximize safety and independence with performance of self-care activities;In order to safely and appropriately apply compensatory strategies with ADL/IADL performance   Target Date 06/13/24   OT Goal 2   Goal Identifier Environmental modifications   Goal Description Patient will demonstrate and/or verbalize 2 environmentally-based ADL modifications to improve visual-based ADL/IADL activities.   Rationale In order to maximize safety and independence with performance of self-care activities;In order to safely and appropriately apply compensatory strategies with ADL/IADL performance   Target Date 06/13/24   OT Goal 3   Goal Identifier Resource Education   Goal Description Patient will verbalize awareness of 2 community resources for increased ADL/IADL completion.   Rationale In order to maximize safety and independence with performance  "of self-care activities   Target Date 06/13/24   OT Goal 4   Goal Identifier Distance Viewing   Goal Description Patient will demonstrate increased independence in distance viewing for safety and leisure during ADL/IADLs through independent use of at least one adaptive technique or AE.   Rationale In order to maximize safety and independence with performance of self-care activities;In order to safely and appropriately apply compensatory strategies with ADL/IADL performance   Target Date 06/13/24   Subjective Report   Subjective Report Patient with questions re: technology adaptations for vision.   Objective Measures   Objective Measures Objective Measure 1   Objective Measure 1   Objective Measure MN[place] without contact and without glasses   Details 3/11/24: at ~1-2\" from chart: 1.3M with much effort but much improved over 6.3M at eval with contact in R eye (didn't have glasses which he would typically wear over the contact)   Treatment Interventions (OT)   Interventions Self Care/Home Management   Self Care/Home Management   Self-Care/Home Mgmt/ADL, Compensatory, Meal Prep Minutes (34042) 53 Minutes   Self Care 1 Adaptive equipment and adapted strategies to maximize visual based ADLs/IADLs   Skilled Intervention Please see education section below for details of all areas of education completed today including education in AE and adapted techniques to increase visibility for ADL/IADLs.  The most successful techniques and AE are listed below.  Patient demonstrated and/or verbalized use of all of the adapated techniques and AE.  Custom typed out instructions and resources were provided in large bold print regarding education completed today.   Patient Response/Progress progress in goals 1,2   Education   Learner/Method Patient;Listening;Reading;Demonstration   Education Comments Continued adaptations and features for technology, specifically his iPad again today: Extensive education and trial in voiceover (single and " double tap, moving finger across screen), Grace (how to use, what to use for, etc. - adapted his to respond always in voice, etc.), Zoom (quick zoom and double tap controller, how to navigate the page, turned on controller, etc.); how to navigate his email best; strong encouragement to use screen readers/audio more than vision; ergonomics due to notable cervical flexion needed for him to use around the neck stahl for ipad (ipad stahl that clamps on to table or on stand to keep neck in neutral position, etc.)   Plan   Home program NEW: Voiceover, Zoom (quick and double tap on controller primarily), more Grace, consider clamp on or stand stahl for ipad; CONT: more options for Spoken Content, consider +10.0 prismatic's; PRL inferior, try Grace to turn off timers/alarms   Plan for next session check hw; **wants to focus on cell ph next time; make more appts? **f/up with [facility] visit (Rebecca) on 3/18 - 1st visit (O&M? tech?, +10.0 prismatics, etc.); re-do MNREad and CSF with his strong Rx readers if he is wearing his R contact (doesn't usually use - did much better without contact/without glasses 3/11); SRAFVP as needed; *check hw and further tech - more voiceover prn; trial 12 and 14 prismatics (did up to 10 so far); try Hume for all levels of distance; **all cell ph and mac mini adaptations (knows spoken content for sure - review more; do: Seeing , magnifier gigi, more Hey Grace - **give h/o for ideas, Be My Eyes, etc.) - not super techy but willing to try; filters - try to find same as what he has (afraid to lose them - not sure if optical shop has more of same?); lighting and contrast prn; PBS/writing with CCTV (he has one at home but doesn't use)? tactile/bump dots; (has had O&M but doesn't use); more per SRAFVP   Total Session Time   Timed Code Treatment Minutes 53   Total Treatment Time (sum of timed and untimed services) 53         [facility]                                                                                    OUTPATIENT OCCUPATIONAL THERAPY    PLAN OF TREATMENT FOR OUTPATIENT REHABILITATION   Patient's Last Name, First Name, LITOTrenaJefry Warner YOB: 1950   Provider's Name   [facility]   Medical Record No.  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  Full progress note to be written at 10th visit or discharge, whichever comes first.    Sienna Plascencia, OT                         I CERTIFY THE NEED FOR THESE SERVICES FURNISHED UNDER        THIS PLAN OF TREATMENT AND WHILE UNDER MY CARE     (Physician attestation of this document indicates review and certification of the therapy plan).              Referring Provider:  Lisa Chang    Initial Assessment  See Epic Evaluation-  12/18/23

## 2024-04-23 ENCOUNTER — THERAPY VISIT (OUTPATIENT)
Dept: PHYSICAL THERAPY | Facility: CLINIC | Age: 74
End: 2024-04-23
Payer: COMMERCIAL

## 2024-04-23 ENCOUNTER — OFFICE VISIT (OUTPATIENT)
Dept: AUDIOLOGY | Facility: CLINIC | Age: 74
End: 2024-04-23
Payer: COMMERCIAL

## 2024-04-23 DIAGNOSIS — H91.90 DECREASED HEARING, UNSPECIFIED LATERALITY: ICD-10-CM

## 2024-04-23 DIAGNOSIS — M25.551 GREATER TROCHANTERIC PAIN SYNDROME OF RIGHT LOWER EXTREMITY: Primary | ICD-10-CM

## 2024-04-23 DIAGNOSIS — H90.3 SENSORINEURAL HEARING LOSS, BILATERAL: Primary | ICD-10-CM

## 2024-04-23 PROCEDURE — 92557 COMPREHENSIVE HEARING TEST: CPT

## 2024-04-23 PROCEDURE — 97140 MANUAL THERAPY 1/> REGIONS: CPT | Mod: GP | Performed by: PHYSICAL THERAPIST

## 2024-04-23 PROCEDURE — 92550 TYMPANOMETRY & REFLEX THRESH: CPT

## 2024-04-23 PROCEDURE — 97112 NEUROMUSCULAR REEDUCATION: CPT | Mod: GP | Performed by: PHYSICAL THERAPIST

## 2024-04-23 NOTE — PROGRESS NOTES
AUDIOLOGY REPORT    SUBJECTIVE:  Jefry Hatfield is a 73 year old male who was seen in the Audiology Clinic at the [facility] for audiologic evaluation, referred by Varinder See M.D. The patient has been seen previously at the [facility] on 10/5/2018 for assessment and results indicated a bilateral sensorineural hearing loss. The patient reports a possible decline in hearing and would like an updated hearing evaluation. The patient denies dizziness, bilateral tinnitus, bilateral otalgia, bilateral drainage, and bilateral aural fullness. The patient notes difficulty with communication in a variety of listening situations. He was accompanied by his wife to today's appointment     OBJECTIVE:    Otoscopic exam indicates ears are partially occluded with cerumen, bilaterally.     Pure Tone Thresholds assessed using conventional audiometry with good  reliability from 250-8000 Hz bilaterally using circumaural headphones     RIGHT:  normal sloping to severe sensorineural hearing loss    LEFT:    normal sloping to severe sensorineural hearing loss  *Could not use inserts due to increased risk of cerumen impaction     Tympanogram:    RIGHT: normal eardrum mobility    LEFT:   normal eardrum mobility    Reflexes (reported by stimulus ear):  RIGHT: Ipsilateral is present at normal levels  LEFT:   Ipsilateral is present at normal levels    Speech Reception Threshold:    RIGHT: 20 dB HL    LEFT:   25 dB HL  Word Recognition Score:     RIGHT: 96% at 60 dB HL using NU-6 recorded word list.    LEFT:   100% at 65 dB HL using NU-6 recorded word list.      ASSESSMENT:   Compared to patient's previous audiogram dated 10/5/2018, hearing has remained stable through 1000 Hz and has gotten worse from 4548-2297 Hz in both ears. Today s results were discussed with the patient and his wife in detail.     PLAN:  Patient was counseled regarding hearing loss and impact on communication. Patient is a good candidate for  amplification at this time. It is recommended that the patient return for a hearing aid consultation and every 2-3 years for an updated audiogram-sooner with changes/concerns. It was also recommended patient follow-up with his PCP regarding partial cerumen impaction Please call this clinic with questions regarding these results or recommendations.      Flor Rosales, [facility]  Licensed Audiologist  [place] #[id number]      04/23/24

## 2024-04-30 ENCOUNTER — THERAPY VISIT (OUTPATIENT)
Dept: PHYSICAL THERAPY | Facility: CLINIC | Age: 74
End: 2024-04-30
Payer: COMMERCIAL

## 2024-04-30 DIAGNOSIS — M25.551 GREATER TROCHANTERIC PAIN SYNDROME OF RIGHT LOWER EXTREMITY: Primary | ICD-10-CM

## 2024-04-30 PROCEDURE — 97140 MANUAL THERAPY 1/> REGIONS: CPT | Mod: GP | Performed by: PHYSICAL THERAPIST

## 2024-04-30 PROCEDURE — 97112 NEUROMUSCULAR REEDUCATION: CPT | Mod: GP | Performed by: PHYSICAL THERAPIST

## 2024-05-02 ENCOUNTER — THERAPY VISIT (OUTPATIENT)
Dept: OCCUPATIONAL THERAPY | Facility: CLINIC | Age: 74
End: 2024-05-02
Attending: OPHTHALMOLOGY
Payer: COMMERCIAL

## 2024-05-02 DIAGNOSIS — H15.833 POSTERIOR STAPHYLOMA, BILATERAL: Primary | ICD-10-CM

## 2024-05-02 PROCEDURE — 97535 SELF CARE MNGMENT TRAINING: CPT | Mod: GO | Performed by: OCCUPATIONAL THERAPIST

## 2024-05-07 ENCOUNTER — THERAPY VISIT (OUTPATIENT)
Dept: PHYSICAL THERAPY | Facility: CLINIC | Age: 74
End: 2024-05-07
Payer: COMMERCIAL

## 2024-05-07 DIAGNOSIS — M25.551 GREATER TROCHANTERIC PAIN SYNDROME OF RIGHT LOWER EXTREMITY: Primary | ICD-10-CM

## 2024-05-07 PROCEDURE — 97530 THERAPEUTIC ACTIVITIES: CPT | Mod: GP | Performed by: PHYSICAL THERAPIST

## 2024-05-07 PROCEDURE — 97112 NEUROMUSCULAR REEDUCATION: CPT | Mod: GP | Performed by: PHYSICAL THERAPIST

## 2024-05-07 PROCEDURE — 97140 MANUAL THERAPY 1/> REGIONS: CPT | Mod: GP | Performed by: PHYSICAL THERAPIST

## 2024-05-16 ENCOUNTER — THERAPY VISIT (OUTPATIENT)
Dept: PHYSICAL THERAPY | Facility: CLINIC | Age: 74
End: 2024-05-16
Payer: COMMERCIAL

## 2024-05-16 DIAGNOSIS — M25.551 GREATER TROCHANTERIC PAIN SYNDROME OF RIGHT LOWER EXTREMITY: Primary | ICD-10-CM

## 2024-05-16 PROCEDURE — 97140 MANUAL THERAPY 1/> REGIONS: CPT | Mod: GP | Performed by: PHYSICAL THERAPIST

## 2024-05-23 ENCOUNTER — THERAPY VISIT (OUTPATIENT)
Dept: PHYSICAL THERAPY | Facility: CLINIC | Age: 74
End: 2024-05-23
Payer: COMMERCIAL

## 2024-05-23 DIAGNOSIS — M25.551 GREATER TROCHANTERIC PAIN SYNDROME OF RIGHT LOWER EXTREMITY: Primary | ICD-10-CM

## 2024-05-23 DIAGNOSIS — M53.3 PAIN OF RIGHT SACROILIAC JOINT: ICD-10-CM

## 2024-05-23 PROCEDURE — 97140 MANUAL THERAPY 1/> REGIONS: CPT | Mod: GP | Performed by: PHYSICAL THERAPIST

## 2024-05-23 PROCEDURE — 97112 NEUROMUSCULAR REEDUCATION: CPT | Mod: GP | Performed by: PHYSICAL THERAPIST

## 2024-05-23 ASSESSMENT — ACTIVITIES OF DAILY LIVING (ADL)
GOING_UP_1_FLIGHT_OF_STAIRS: NO DIFFICULTY AT ALL
ROLLING_OVER_IN_BED: NO DIFFICULTY AT ALL
HOS_ADL_HIGHEST_POTENTIAL_SCORE: 68
RECREATIONAL_ACTIVITIES: NO DIFFICULTY AT ALL
HOS_ADL_COUNT: 17
HEAVY_WORK: NO DIFFICULTY AT ALL
STANDING_FOR_15_MINUTES: NO DIFFICULTY AT ALL
HOW_WOULD_YOU_RATE_YOUR_CURRENT_LEVEL_OF_FUNCTION_DURING_YOUR_USUAL_ACTIVITIES_OF_DAILY_LIVING_FROM_0_TO_100_WITH_100_BEING_YOUR_LEVEL_OF_FUNCTION_PRIOR_TO_YOUR_HIP_PROBLEM_AND_0_BEING_THE_INABILITY_TO_PERFORM_ANY_OF_YOUR_USUAL_DAILY_ACTIVITIES?: 90
WALKING_APPROXIMATELY_10_MINUTES: NO DIFFICULTY AT ALL
TWISTING/PIVOTING_ON_INVOLVED_LEG: NO DIFFICULTY AT ALL
WALKING_UP_STEEP_HILLS: NO DIFFICULTY AT ALL
SITTING_FOR_15_MINUTES: NO DIFFICULTY AT ALL
WALKING_DOWN_STEEP_HILLS: NO DIFFICULTY AT ALL
LIGHT_TO_MODERATE_WORK: NO DIFFICULTY AT ALL
WALKING_15_MINUTES_OR_GREATER: NO DIFFICULTY AT ALL
DEEP_SQUATTING: NO DIFFICULTY AT ALL
GOING_DOWN_1_FLIGHT_OF_STAIRS: NO DIFFICULTY AT ALL
HOS_ADL_ITEM_SCORE_TOTAL: 68
PUTTING_ON_SOCKS_AND_SHOES: NO DIFFICULTY AT ALL
WALKING_INITIALLY: NO DIFFICULTY AT ALL
HOS_ADL_SCORE(%): 100
GETTING_INTO_AND_OUT_OF_AN_AVERAGE_CAR: NO DIFFICULTY AT ALL
GETTING_INTO_AND_OUT_OF_A_BATHTUB: NO DIFFICULTY AT ALL
STEPPING_UP_AND_DOWN_CURBS: NO DIFFICULTY AT ALL

## 2024-05-23 NOTE — PROGRESS NOTES
DISCHARGE REPORT    Progress reporting period is from 3- to  5-.       SUBJECTIVE  Subjective changes noted by patient:  Significant improvement overall.  Running one time per week for 5-6 miles pain free.   He now has times when he can walk 30 minutes pain free and mostly 0-1/10. He feels comfortable continuing with independent home program         Current pain level is 0-1/10  .     Previous pain level was  2/10  .   Changes in function:  Yes (See Goal flowsheet attached for changes in current functional level)  Adverse reaction to treatment or activity: None    OBJECTIVE  Changes noted in objective findings:  BALANCE/PROPRIOCEPTION:  single leg balance: 2 seconds (L), 2 seconds (R)   ROM:   (Degrees) Left AROM Left PROM  Right AROM Right PROM   Hip Flexion 115   115     Hip Extension 10   10     Hip Abduction 15   15     Hip Adduction           Hip Internal Rotation 12   14     Hip External Rotation 28   30     Knee Flexion wnl   wnl     Knee Extension wnl   wnl     Lumbar Side glide LSB: 50% RSB: 50%   Lumbar Flexion 90%   Lumbar Extension 75%   Pain:   End feel:      PELVIC/SI SCREEN:  forward bend test positive standing, right posterior psis  STRENGTH:  mmt: gluteus medius: 4-/5 (R), 3+/5 (L)  good gluteus ted contraction (R), fair left  hip extension:  4-/5-4/5/5 ( r),4/5 (L) quadriceps: 5/5 (B), gastrocnemius: 3/5 (B)     Functional Assessment: Good squat mechanics with cueing of knee and trunk position without use of hands  Sitting posture: Improved with cueing and still has tendency to sit with increased thoracic kyphosis and lumbar kyphosis       ASSESSMENT/PLAN  Updated problem list and treatment plan: Diagnosis 1:  right greater trochanteric pain   Pain -  hot/cold therapy, manual therapy, self management, education, directional preference exercise, and home program  Decreased strength - therapeutic exercise, therapeutic activities, and home program  Impaired balance - neuro  re-education, therapeutic activities, and home program  Impaired muscle performance - neuro re-education and home program  Decreased function - therapeutic activities and home program  Impaired posture - neuro re-education, therapeutic activities, and home program  STG/LTGs have been met or progress has been made towards goals:  Yes (See Goal flow sheet completed today.)  Assessment of Progress: The patient's condition has potential to improve.  Self Management Plans:  Patient has been instructed in a home treatment program.  Patient  has been instructed in self management of symptoms.  Patient is independent in self management of symptoms.  I have re-evaluated this patient and find that the nature, scope, duration and intensity of the therapy is appropriate for the medical condition of the patient.  Jefry continues to require the following intervention to meet STG and LTG's:  PT intervention is no longer required to meet STG/LTG.    Recommendations:  This patient is ready to be discharged from therapy and continue their home treatment program.    Please refer to the daily flowsheet for treatment today, total treatment time and time spent performing 1:1 timed codes.        [facility]                                                                                   OUTPATIENT PHYSICAL THERAPY    PLAN OF TREATMENT FOR OUTPATIENT REHABILITATION   Patient's Last Name, First Name, PEDRO HatfieldJefry  David YOB: 1950   Provider's Name   [facility]   Medical Record No.  [id number]     Onset Date: 11/01/23  Start of Care Date: 03/19/24     Medical Diagnosis:  right SIJ pain, right greater trochanter pain      PT Treatment Diagnosis:  right SIJ pain, right greater trochanter pain Plan of Treatment  Frequency/Duration: 1x/week/ 4 weeks    Certification date from 05/22/24 to 06/20/24         See note for plan of treatment details and functional  goals     Jing Osuna, PT                         I CERTIFY THE NEED FOR THESE SERVICES FURNISHED UNDER        THIS PLAN OF TREATMENT AND WHILE UNDER MY CARE     (Physician attestation of this document indicates review and certification of the therapy plan).              Referring Provider:  Danish Butt    Initial Assessment  See Epic Evaluation- Start of Care Date: 03/19/24            DISCHARGE  Reason for Discharge:  patient has improved and made progress with goals    Equipment Issued: none    Discharge Plan: Patient to continue home program.    Referring Provider:  Danish Butt

## 2024-05-29 ENCOUNTER — TELEPHONE (OUTPATIENT)
Dept: INTERNAL MEDICINE | Facility: CLINIC | Age: 74
End: 2024-05-29
Payer: COMMERCIAL

## 2024-05-29 ENCOUNTER — NURSE TRIAGE (OUTPATIENT)
Dept: NURSING | Facility: CLINIC | Age: 74
End: 2024-05-29

## 2024-05-29 ENCOUNTER — APPOINTMENT (OUTPATIENT)
Dept: CT IMAGING | Facility: CLINIC | Age: 74
End: 2024-05-29
Attending: EMERGENCY MEDICINE
Payer: COMMERCIAL

## 2024-05-29 ENCOUNTER — HOSPITAL ENCOUNTER (EMERGENCY)
Facility: CLINIC | Age: 74
Discharge: HOME | End: 2024-05-29
Attending: EMERGENCY MEDICINE | Admitting: EMERGENCY MEDICINE
Payer: COMMERCIAL

## 2024-05-29 VITALS
OXYGEN SATURATION: 99 % | WEIGHT: 157.6 LBS | SYSTOLIC BLOOD PRESSURE: 125 MMHG | HEART RATE: 94 BPM | BODY MASS INDEX: 21.35 KG/M2 | HEIGHT: 72 IN | RESPIRATION RATE: 20 BRPM | TEMPERATURE: 98.2 F | DIASTOLIC BLOOD PRESSURE: 91 MMHG

## 2024-05-29 DIAGNOSIS — K52.89 STERCORAL COLITIS: ICD-10-CM

## 2024-05-29 DIAGNOSIS — K59.00 CONSTIPATION, UNSPECIFIED CONSTIPATION TYPE: ICD-10-CM

## 2024-05-29 DIAGNOSIS — R33.9 URINARY RETENTION: ICD-10-CM

## 2024-05-29 LAB
ALBUMIN SERPL BCG-MCNC: 3.9 G/DL (ref 3.5–5.2)
ALBUMIN UR-MCNC: NEGATIVE MG/DL
ALP SERPL-CCNC: 116 U/L (ref 40–150)
ALT SERPL W P-5'-P-CCNC: 17 U/L (ref 0–70)
ANION GAP SERPL CALCULATED.3IONS-SCNC: 10 MMOL/L (ref 7–15)
APPEARANCE UR: CLEAR
AST SERPL W P-5'-P-CCNC: 17 U/L (ref 0–45)
BASOPHILS # BLD AUTO: 0 10E3/UL (ref 0–0.2)
BASOPHILS NFR BLD AUTO: 0 %
BILIRUB SERPL-MCNC: 0.8 MG/DL
BILIRUB UR QL STRIP: NEGATIVE
BUN SERPL-MCNC: 16.1 MG/DL (ref 8–23)
CALCIUM SERPL-MCNC: 8.9 MG/DL (ref 8.8–10.2)
CHLORIDE SERPL-SCNC: 97 MMOL/L (ref 98–107)
COLOR UR AUTO: ABNORMAL
CREAT SERPL-MCNC: 1.05 MG/DL (ref 0.67–1.17)
DEPRECATED HCO3 PLAS-SCNC: 26 MMOL/L (ref 22–29)
EGFRCR SERPLBLD CKD-EPI 2021: 76 ML/MIN/1.73M2
EOSINOPHIL # BLD AUTO: 0 10E3/UL (ref 0–0.7)
EOSINOPHIL NFR BLD AUTO: 0 %
ERYTHROCYTE [DISTWIDTH] IN BLOOD BY AUTOMATED COUNT: 11.9 % (ref 10–15)
GLUCOSE SERPL-MCNC: 102 MG/DL (ref 70–99)
GLUCOSE UR STRIP-MCNC: NEGATIVE MG/DL
HCT VFR BLD AUTO: 39.6 % (ref 40–53)
HGB BLD-MCNC: 13.9 G/DL (ref 13.3–17.7)
HGB UR QL STRIP: NEGATIVE
HYALINE CASTS: 1 /LPF
IMM GRANULOCYTES # BLD: 0 10E3/UL
IMM GRANULOCYTES NFR BLD: 0 %
KETONES UR STRIP-MCNC: NEGATIVE MG/DL
LEUKOCYTE ESTERASE UR QL STRIP: NEGATIVE
LYMPHOCYTES # BLD AUTO: 1.1 10E3/UL (ref 0.8–5.3)
LYMPHOCYTES NFR BLD AUTO: 11 %
MCH RBC QN AUTO: 32.3 PG (ref 26.5–33)
MCHC RBC AUTO-ENTMCNC: 35.1 G/DL (ref 31.5–36.5)
MCV RBC AUTO: 92 FL (ref 78–100)
MONOCYTES # BLD AUTO: 0.7 10E3/UL (ref 0–1.3)
MONOCYTES NFR BLD AUTO: 8 %
MUCOUS THREADS #/AREA URNS LPF: PRESENT /LPF
NEUTROPHILS # BLD AUTO: 7.6 10E3/UL (ref 1.6–8.3)
NEUTROPHILS NFR BLD AUTO: 81 %
NITRATE UR QL: NEGATIVE
NRBC # BLD AUTO: 0 10E3/UL
NRBC BLD AUTO-RTO: 0 /100
PH UR STRIP: 8 [PH] (ref 5–7)
PLATELET # BLD AUTO: 223 10E3/UL (ref 150–450)
POTASSIUM SERPL-SCNC: 4.4 MMOL/L (ref 3.4–5.3)
PROT SERPL-MCNC: 6.1 G/DL (ref 6.4–8.3)
RBC # BLD AUTO: 4.31 10E6/UL (ref 4.4–5.9)
RBC URINE: 2 /HPF
SODIUM SERPL-SCNC: 133 MMOL/L (ref 135–145)
SP GR UR STRIP: 1.01 (ref 1–1.03)
UROBILINOGEN UR STRIP-MCNC: NORMAL MG/DL
WBC # BLD AUTO: 9.4 10E3/UL (ref 4–11)
WBC URINE: 0 /HPF

## 2024-05-29 PROCEDURE — 51798 US URINE CAPACITY MEASURE: CPT | Performed by: EMERGENCY MEDICINE

## 2024-05-29 PROCEDURE — 82040 ASSAY OF SERUM ALBUMIN: CPT | Performed by: EMERGENCY MEDICINE

## 2024-05-29 PROCEDURE — 99284 EMERGENCY DEPT VISIT MOD MDM: CPT | Performed by: EMERGENCY MEDICINE

## 2024-05-29 PROCEDURE — 36415 COLL VENOUS BLD VENIPUNCTURE: CPT | Performed by: EMERGENCY MEDICINE

## 2024-05-29 PROCEDURE — 250N000013 HC RX MED GY IP 250 OP 250 PS 637: Performed by: EMERGENCY MEDICINE

## 2024-05-29 PROCEDURE — 74177 CT ABD & PELVIS W/CONTRAST: CPT

## 2024-05-29 PROCEDURE — 250N000009 HC RX 250: Performed by: EMERGENCY MEDICINE

## 2024-05-29 PROCEDURE — 99285 EMERGENCY DEPT VISIT HI MDM: CPT | Mod: 25 | Performed by: EMERGENCY MEDICINE

## 2024-05-29 PROCEDURE — 250N000011 HC RX IP 250 OP 636: Performed by: EMERGENCY MEDICINE

## 2024-05-29 PROCEDURE — 81003 URINALYSIS AUTO W/O SCOPE: CPT | Performed by: EMERGENCY MEDICINE

## 2024-05-29 PROCEDURE — 85025 COMPLETE CBC W/AUTO DIFF WBC: CPT | Performed by: EMERGENCY MEDICINE

## 2024-05-29 RX ORDER — IOPAMIDOL 755 MG/ML
100 INJECTION, SOLUTION INTRAVASCULAR ONCE
Status: COMPLETED | OUTPATIENT
Start: 2024-05-29 | End: 2024-05-29

## 2024-05-29 RX ORDER — DOCUSATE SODIUM 100 MG/1
100 CAPSULE, LIQUID FILLED ORAL 2 TIMES DAILY
Qty: 60 CAPSULE | Refills: 2 | Status: SHIPPED | OUTPATIENT
Start: 2024-05-29

## 2024-05-29 RX ORDER — LACTULOSE 10 G/15ML
200 SOLUTION ORAL ONCE
Status: COMPLETED | OUTPATIENT
Start: 2024-05-29 | End: 2024-05-29

## 2024-05-29 RX ORDER — SENNOSIDES A AND B 8.6 MG/1
1 TABLET, FILM COATED ORAL DAILY
Qty: 30 TABLET | Refills: 3 | Status: SHIPPED | OUTPATIENT
Start: 2024-05-29

## 2024-05-29 RX ORDER — POLYETHYLENE GLYCOL 3350 17 G/17G
1 POWDER, FOR SOLUTION ORAL DAILY
Qty: 850 G | Refills: 0 | Status: SHIPPED | OUTPATIENT
Start: 2024-05-29

## 2024-05-29 RX ADMIN — SIMETHICONE 226 ML: 125 CAPSULE, LIQUID FILLED ORAL at 20:09

## 2024-05-29 RX ADMIN — IOPAMIDOL 77 ML: 755 INJECTION, SOLUTION INTRAVENOUS at 18:48

## 2024-05-29 RX ADMIN — SODIUM CHLORIDE 38 ML: 9 INJECTION, SOLUTION INTRAVENOUS at 18:48

## 2024-05-29 RX ADMIN — LACTULOSE 200 G: 10 SOLUTION ORAL at 21:00

## 2024-05-29 ASSESSMENT — ACTIVITIES OF DAILY LIVING (ADL)
ADLS_ACUITY_SCORE: 35

## 2024-05-29 ASSESSMENT — COLUMBIA-SUICIDE SEVERITY RATING SCALE - C-SSRS
2. HAVE YOU ACTUALLY HAD ANY THOUGHTS OF KILLING YOURSELF IN THE PAST MONTH?: NO
6. HAVE YOU EVER DONE ANYTHING, STARTED TO DO ANYTHING, OR PREPARED TO DO ANYTHING TO END YOUR LIFE?: NO
1. IN THE PAST MONTH, HAVE YOU WISHED YOU WERE DEAD OR WISHED YOU COULD GO TO SLEEP AND NOT WAKE UP?: NO

## 2024-05-29 NOTE — TELEPHONE ENCOUNTER
Unable to reach pt for the patient to call back and schedule the following:    Appointment type: P RETURN  Provider: any PCC  Return date: soonest available, preferably 5/30  Specialty phone number: [phone]      Please schedule in ANY resident slot on 5/30 regardless of visit type. Or schedule in any other opening.

## 2024-05-29 NOTE — TELEPHONE ENCOUNTER
"Nurse Triage SBAR    Is this a 2nd Level Triage? YES, LICENSED PRACTITIONER REVIEW IS REQUIRED    Situation:   Pt with no BM since Monday, when he had a soft formed BM.  Tried MOM, prunes and prune juice and sauerkraut and silicone husk.  Feels at rectum, urge to have a BM, but does not come out.  Warm bath yesterday.      Background:     Assessment:  Patient normal is a daily BM, last one was on Monday, soft formed BM  Patient has the urge to have a BM, but unable  Patient did a warm bath yesterday.  Did MOM and prune juice and prunes with silicone husk and sauerkraut  Pain to right side rectum, tender  \"muscle spasms sort of pain\"  Pain 5-6/10 uncomfortable, comes in waves  Abdomen is not distended  Pt swims and runs   Fluids daily at 48-60 oz of water  Denies hemorrhoids  Pt wants to be seen  Pt has not tried Miralax or enema, as they do not have them in the home.  Good diet      Protocol Recommended Disposition:   Routing to PCP for a call back    Recommendation:   Warm bath,  Keep up on the fluids  Miralax or enema if your ok to use them    Routed to provider      Reason for Disposition   Rectal pain or fullness from fecal impaction (rectum full of stool) and NOT better after SITZ bath, suppository or enema   Patient wants to be seen    Additional Information   Negative: Vomiting bile (green color)   Negative: Patient sounds very sick or weak to the triager    Answer Assessment - Initial Assessment Questions  1. STOOL PATTERN OR FREQUENCY: \"How often do you have a bowel movement (BM)?\"  (Normal range: 3 times a day to every 3 days)  \"When was your last BM?\"        Patient not able to have a bowel movement since Monday.  Feels stool at rectum, urge to have a BM, but not able to.  Taking prunes and prune juice bid, MOM dose yesterday, Silicone husk and sauerkraut.    2. STRAINING: \"Do you have to strain to have a BM?\"       Not usually    3. RECTAL PAIN: \"Does your rectum hurt when the stool comes out?\" If Yes, " "ask: \"Do you have hemorrhoids? How bad is the pain?\"  (Scale 1-10; or mild, moderate, severe)      Pain with urge to have a BM and not able to    4. STOOL COMPOSITION: \"Are the stools hard?\"       His last one was formed soft.  His  normal BM habit is daily    5. BLOOD ON STOOLS: \"Has there been any blood on the toilet tissue or on the surface of the BM?\" If Yes, ask: \"When was the last time?\"      No    6. CHRONIC CONSTIPATION: \"Is this a new problem for you?\"  If No, ask: \"How long have you had this problem?\" (days, weeks, months)       Just since Monday    7. CHANGES IN DIET OR HYDRATION: \"Have there been any recent changes in your diet?\" \"How much fluids are you drinking on a daily basis?\"  \"How much have you had to drink today?\"      Drinking 48-60 oz of water daily,  No changes    8. MEDICINES: \"Have you been taking any new medicines?\" \"Are you taking any narcotic pain medicines?\" (e.g., Dilaudid, morphine, Percocet, Vicodin)      New depression med  125mg    9. LAXATIVES: \"Have you been using any stool softeners, laxatives, or enemas?\"  If Yes, ask \"What, how often, and when was the last time?\"      Using MOM, silicone husk and sauerkraut and.   NO enemas yeti n the house, no Miralax in the house.  He does have some tablets from a Colonoscopy, RN is not familiar and did not say yes to take them.  10. ACTIVITY:  \"How much walking do you do every day?\"  \"Has your activity level decreased in the past week?\"         Swims, and runs and sat in the sauna and warm bath yesterday  11. CAUSE: \"What do you think is causing the constipation?\"         Possible increase in his Mg amount of med  or unknown    12. OTHER SYMPTOMS: \"Do you have any other symptoms?\" (e.g., abdomen pain, bloating, fever, vomiting)        Abdominal pain, now more right side rectal discomfort    Pain 5-6/10  pressure  \"Muscle spasms sort of pain\"    13. MEDICAL HISTORY: \"Do you have a history of hemorrhoids, rectal fissures, or rectal surgery or " "rectal abscess?\"          No hemorrhoids currently , a long time ago    14. PREGNANCY: \"Is there any chance you are pregnant?\" \"When was your last menstrual period?\"        N/a    Protocols used: Constipation-A-[place]    "

## 2024-05-29 NOTE — ED TRIAGE NOTES
Patient reports pain on his prostate, feel pressure since 2 am.      Triage Assessment (Adult)       Row Name 05/29/24 9719          Triage Assessment    Airway WDL WDL        Respiratory WDL    Respiratory WDL WDL        Skin Circulation/Temperature WDL    Skin Circulation/Temperature WDL WDL        Cardiac WDL    Cardiac WDL WDL        Peripheral/Neurovascular WDL    Peripheral Neurovascular WDL WDL        Cognitive/Neuro/Behavioral WDL    Cognitive/Neuro/Behavioral WDL WDL

## 2024-05-29 NOTE — ED PROVIDER NOTES
ED Provider Note  [facility]      History     Chief Complaint   Patient presents with    Constipation     Patient reports no BM since Tuesday, patient has taken Milk of Magnesia  and prune Juice     Urinary Retention     Patient reports he hasn't voided since 2 am today     HPI  Jefry Hatfield is a 73 year old male with history of anxiety on nortriptyline who presents with constipation and urinary retention. Reports that he has not been able to void since 2 am last night despite urge now with suprapubic fullness. Additionally has not had a bowel movement since two days ago (5/27), is passing flatus. Endorses associated waxing and waning rectal cramping that started yesterday. Constipation persists despite trying prunes, prune juice, psyllium husk, milk of magnesia and Smooth Move tea (contains senna). Prior to this was having bowel movements every day or two. Endorses some involuntary shaking today, but denies any fevers, chills, N/V/D, CP, SOB, previous hematuria or dysuria. Reports constipation with increasing amitriptyline doses in the past. Has tried several different medications for anxiety, and feels that he has had the best results with amitriptyline.    Last colonoscopy: 7/3/2023 with benign pathology and recommendation to return in 10 years.  Surgical history: appendectomy (1975)    Past Medical History  Past Medical History:   Diagnosis Date    Anisometropia     Cataract, right eye     Cervical radiculopathy     High myopia     Myopic degeneration     Posterior staphyloma     LE    Pseudophakia of left eye     Retinal detachment RE [id number]    BE     Past Surgical History:   Procedure Laterality Date    APPENDECTOMY      CATARACT IOL, RT/LT Bilateral 11/14/12LE 7/1/14RE    BE    COLONOSCOPY N/A 5/25/2018    Procedure: COLONOSCOPY;  Screening Colonoscopy;  Surgeon: Zak Kaplan MD;  Location: [facility] OR    COLONOSCOPY N/A 7/3/2023    Procedure: COLONOSCOPY, WITH  POLYPECTOMY AND BIOPSY;  Surgeon: Nancy Silvestre MD;  Location: [facility] OR    PHACOEMULSIFICATION CLEAR CORNEA WITH STANDARD INTRAOCULAR LENS IMPLANT  7/1/2014    Procedure: PHACOEMULSIFICATION CLEAR CORNEA WITH STANDARD INTRAOCULAR LENS IMPLANT;  Surgeon: Milan Bernardo MD;  Location:  [facility]    SCLERAL BUCKLE  1971    RE    SCLERAL BUCKLE  1980    LE    YAG CAPSULOTOMY OD (RIGHT EYE)  10/30/2014     docusate sodium (COLACE) 100 MG capsule  nortriptyline (PAMELOR) 25 MG capsule  polyethylene glycol (MIRALAX) 17 GM/Dose powder  senna (SENOKOT) 8.6 MG tablet  CALCIUM-MAG-VIT C-VIT D PO  ibuprofen (ADVIL/MOTRIN) 200 MG capsule  Omega-3 Fatty Acids (OMEGA-3 FISH OIL PO)  Saw Palmetto, Serenoa repens, (SAW PALMETTO EXTRACT PO)  tacrolimus (PROTOPIC) 0.1 % external ointment  VITAMIN D, CHOLECALCIFEROL, PO      No Known Allergies  Family History  Family History   Problem Relation Age of Onset    Dementia Mother     Heart Failure Father     Asthma Father     Diabetes Paternal Grandfather     Cancer No family hx of     Glaucoma No family hx of     Macular Degeneration No family hx of     Skin Cancer No family hx of     Melanoma No family hx of      Social History   Social History     Tobacco Use    Smoking status: Never     Passive exposure: Never    Smokeless tobacco: Never   Vaping Use    Vaping status: Never Used   Substance Use Topics    Alcohol use: Yes     Comment: occaisonal     Drug use: No      A medically appropriate review of systems was performed with pertinent positives and negatives noted in the HPI, and all other systems negative.    Physical Exam   BP: (!) 138/90  Pulse: 87  Temp: 98.5  F (36.9  C)  Resp: 16  Height: 182.9 cm (6')  Weight: 71.5 kg (157 lb 9.6 oz)  SpO2: 98 %  Physical Exam  Constitutional:       Appearance: Normal appearance.   HENT:      Head: Normocephalic and atraumatic.   Cardiovascular:      Rate and Rhythm: Normal rate and regular rhythm.   Abdominal:      General: A surgical scar  is present. Bowel sounds are normal. There is distension (Suprapubic).   Musculoskeletal:      Cervical back: Normal range of motion.   Neurological:      General: No focal deficit present.      Mental Status: He is alert and oriented to person, place, and time.   Psychiatric:         Mood and Affect: Mood normal.         Behavior: Behavior normal.         ED Course, Procedures, & Data                    Results for orders placed or performed during the hospital encounter of 05/29/24   CT Abdomen Pelvis w Contrast     Status: None    Narrative    EXAM: CT ABDOMEN PELVIS W CONTRAST  LOCATION: [facility]  DATE: 5/29/2024    INDICATION: Unable to have bowel movement, lower abdominal pain, evaluate for evidence of obstructive process, diverticulitis, prostatitis or other pathology  COMPARISON: None available.  TECHNIQUE: CT scan of the abdomen and pelvis was performed following injection of IV contrast. Multiplanar reformats were obtained. Dose reduction techniques were used.  CONTRAST: 77mL Isovue 370    FINDINGS:   LOWER CHEST: Normal.    HEPATOBILIARY: Tiny presumed hepatic cysts. Mild focal fatty infiltration along falciform ligament.    PANCREAS: Normal.    SPLEEN: Normal.    ADRENAL GLANDS: Normal.    KIDNEYS/BLADDER: Normal. No stones or hydronephrosis. Fragoso catheter present with bladder decompressed.    BOWEL: Abundant stool throughout the colon with stool ball filling and distending the rectum. There is mild rectal wall thickening raising question of early stercoral colitis. Small bowel normal.    LYMPH NODES: Normal.    VASCULATURE: Normal.    PELVIC ORGANS: Normal. Diminutive prostate.    MUSCULOSKELETAL: Osteopenia especially involving sacrum      Impression    IMPRESSION:   1.  Abundant stool throughout colon without obstructing lesion evident.  2.  Stool ball distends rectum with mild rectal wall thickening raising question of developing stercoral colitis.  3.   Apparent localized osteopenia involving sacrum.   Comprehensive metabolic panel     Status: Abnormal   Result Value Ref Range    Sodium 133 (L) 135 - 145 mmol/L    Potassium 4.4 3.4 - 5.3 mmol/L    Carbon Dioxide (CO2) 26 22 - 29 mmol/L    Anion Gap 10 7 - 15 mmol/L    Urea Nitrogen 16.1 8.0 - 23.0 mg/dL    Creatinine 1.05 0.67 - 1.17 mg/dL    GFR Estimate 76 >60 mL/min/1.73m2    Calcium 8.9 8.8 - 10.2 mg/dL    Chloride 97 (L) 98 - 107 mmol/L    Glucose 102 (H) 70 - 99 mg/dL    Alkaline Phosphatase 116 40 - 150 U/L    AST 17 0 - 45 U/L    ALT 17 0 - 70 U/L    Protein Total 6.1 (L) 6.4 - 8.3 g/dL    Albumin 3.9 3.5 - 5.2 g/dL    Bilirubin Total 0.8 <=1.2 mg/dL   UA with Microscopic reflex to Culture     Status: Abnormal    Specimen: Urine, Fragoso Catheter   Result Value Ref Range    Color Urine Light Yellow Colorless, Straw, Light Yellow, Yellow    Appearance Urine Clear Clear    Glucose Urine Negative Negative mg/dL    Bilirubin Urine Negative Negative    Ketones Urine Negative Negative mg/dL    Specific Gravity Urine 1.013 1.003 - 1.035    Blood Urine Negative Negative    pH Urine 8.0 (H) 5.0 - 7.0    Protein Albumin Urine Negative Negative mg/dL    Urobilinogen Urine Normal Normal, 2.0 mg/dL    Nitrite Urine Negative Negative    Leukocyte Esterase Urine Negative Negative    Mucus Urine Present (A) None Seen /LPF    RBC Urine 2 <=2 /HPF    WBC Urine 0 <=5 /HPF    Hyaline Casts Urine 1 <=2 /LPF    Narrative    Urine Culture not indicated   CBC with platelets and differential     Status: Abnormal   Result Value Ref Range    WBC Count 9.4 4.0 - 11.0 10e3/uL    RBC Count 4.31 (L) 4.40 - 5.90 10e6/uL    Hemoglobin 13.9 13.3 - 17.7 g/dL    Hematocrit 39.6 (L) 40.0 - 53.0 %    MCV 92 78 - 100 fL    MCH 32.3 26.5 - 33.0 pg    MCHC 35.1 31.5 - 36.5 g/dL    RDW 11.9 10.0 - 15.0 %    Platelet Count 223 150 - 450 10e3/uL    % Neutrophils 81 %    % Lymphocytes 11 %    % Monocytes 8 %    % Eosinophils 0 %    % Basophils 0 %    %  Immature Granulocytes 0 %    NRBCs per 100 WBC 0 <1 /100    Absolute Neutrophils 7.6 1.6 - 8.3 10e3/uL    Absolute Lymphocytes 1.1 0.8 - 5.3 10e3/uL    Absolute Monocytes 0.7 0.0 - 1.3 10e3/uL    Absolute Eosinophils 0.0 0.0 - 0.7 10e3/uL    Absolute Basophils 0.0 0.0 - 0.2 10e3/uL    Absolute Immature Granulocytes 0.0 <=0.4 10e3/uL    Absolute NRBCs 0.0 10e3/uL   CBC with platelets differential     Status: Abnormal    Narrative    The following orders were created for panel order CBC with platelets differential.  Procedure                               Abnormality         Status                     ---------                               -----------         ------                     CBC with platelets and d...[[id number]]  Abnormal            Final result                 Please view results for these tests on the individual orders.     Medications   sodium chloride 0.9 % bag 100 mL for CT (38 mLs Intravenous $Given 5/29/24 1848)   iopamidol (ISOVUE-370) solution 100 mL (77 mLs Intravenous $Given 5/29/24 1848)   Enema Compound (docusate/mineral oil/NaPhos) NO MAG CIT PREMIX (226 mLs Rectal $Given 5/29/24 2009)   lactulose (CHRONULAC) solution for enema prep 200 g (200 g Rectal $Given 5/29/24 2100)     Labs Ordered and Resulted from Time of ED Arrival to Time of ED Departure   COMPREHENSIVE METABOLIC PANEL - Abnormal       Result Value    Sodium 133 (*)     Potassium 4.4      Carbon Dioxide (CO2) 26      Anion Gap 10      Urea Nitrogen 16.1      Creatinine 1.05      GFR Estimate 76      Calcium 8.9      Chloride 97 (*)     Glucose 102 (*)     Alkaline Phosphatase 116      AST 17      ALT 17      Protein Total 6.1 (*)     Albumin 3.9      Bilirubin Total 0.8     ROUTINE UA WITH MICROSCOPIC REFLEX TO CULTURE - Abnormal    Color Urine Light Yellow      Appearance Urine Clear      Glucose Urine Negative      Bilirubin Urine Negative      Ketones Urine Negative      Specific Gravity Urine 1.013      Blood Urine Negative       pH Urine 8.0 (*)     Protein Albumin Urine Negative      Urobilinogen Urine Normal      Nitrite Urine Negative      Leukocyte Esterase Urine Negative      Mucus Urine Present (*)     RBC Urine 2      WBC Urine 0      Hyaline Casts Urine 1     CBC WITH PLATELETS AND DIFFERENTIAL - Abnormal    WBC Count 9.4      RBC Count 4.31 (*)     Hemoglobin 13.9      Hematocrit 39.6 (*)     MCV 92      MCH 32.3      MCHC 35.1      RDW 11.9      Platelet Count 223      % Neutrophils 81      % Lymphocytes 11      % Monocytes 8      % Eosinophils 0      % Basophils 0      % Immature Granulocytes 0      NRBCs per 100 WBC 0      Absolute Neutrophils 7.6      Absolute Lymphocytes 1.1      Absolute Monocytes 0.7      Absolute Eosinophils 0.0      Absolute Basophils 0.0      Absolute Immature Granulocytes 0.0      Absolute NRBCs 0.0       CT Abdomen Pelvis w Contrast   Final Result   IMPRESSION:    1.  Abundant stool throughout colon without obstructing lesion evident.   2.  Stool ball distends rectum with mild rectal wall thickening raising question of developing stercoral colitis.   3.  Apparent localized osteopenia involving sacrum.                 Assessment & Plan    Jefry Hatfield is a 73 year old male presenting with acute urinary retention since 0200 and constipation x 2 days. Patient is vitally stable with significant suprapubic distension and 758 mL on bladder scan. Fragoso catheter was promptly placed. Obtained UA, CBC and CMP which were unremarkable. Acute urinary retention likely secondary to obstruction from constipation and possible contributing anticholinergic effects from amitriptyline. Constipation is a known side effect of amitriptyline, and the patient reports a history of constipation with increasing doses therefore suspect this the likely cause. Reassured by normal colonoscopy 7/3/2023.     In the ED CT abdomen pelvis demonstrated abundant stool throughout colon without obstructing lesion evident, stool  ball present with possible developing stercoral colitis. Normal prostate. Given pink lady enema with very minimal stool output. Later given lactulose enema which was effective.    Ultimately patient was discharged home stable following bowel movement with covington catheter in place. Plan to follow-up with PCP tomorrow as scheduled, they may consider removing catheter after trial of void. Patient was discharged with MiraLAX, Senokot and Colace. He was also given close return precautions for the emergency department and voiced understanding.    --    ED Attending Physician Attestation    I Arash Joseph MD, cared for this patient with the Medical Student. I performed, or re-performed, the physical exam and medical decision-making. I have verified the accuracy of all the medical student findings and documentation above, and have edited as necessary.    Summary of HPI, PE, ED Course   Patient is a 73 year old male evaluated in the emergency department for constipation and urinary retention. Exam and ED course notable for patient was noted to have urinary retention with approximately 700 cc of urine in his bladder.  Covington catheter was placed facilitating bladder drainage.  No evidence of acute kidney injury is noted.  Due to the patient's complaints of severe constipation and some rectal discomfort CT was obtained.  This did demonstrate a large stool burden and some evidence of early stercoral colitis but no evidence of perforation or abscess or other acute pathology.  Digital exam was performed by myself and did demonstrate some stool in the rectum but this was too high to digitally disimpact so patient was given a pink lady enema followed by a lactulose enema which did resolve and a sizable bowel movement and improvement of the patient's discomfort.  He will be prescribed MiraLAX and a bowel regimen and reports that he has follow-up in clinic tomorrow at which time they could try a trial of void to see if  catheter can be safely removed. After the completion of care in the emergency department, the patient was discharged.      Arash Joseph MD  Emergency Medicine         I have reviewed the nursing notes. I have reviewed the findings, diagnosis, plan and need for follow up with the patient.    Discharge Medication List as of 5/29/2024 10:26 PM        START taking these medications    Details   docusate sodium (COLACE) 100 MG capsule Take 1 capsule (100 mg) by mouth 2 times daily, Disp-60 capsule, R-2, Local Print      polyethylene glycol (MIRALAX) 17 GM/Dose powder Take 17 g (1 Capful) by mouth daily, Disp-850 g, R-0, Local Print      senna (SENOKOT) 8.6 MG tablet Take 1 tablet by mouth daily, Disp-30 tablet, R-3, Local Print             Final diagnoses:   Constipation, unspecified constipation type   Stercoral colitis   Urinary retention   Nury Orlando MS4  [employer]  Staffed with Dr. Opal Joseph MD  [facility] EMERGENCY DEPARTMENT  5/29/2024        Arash Joseph MD  06/01/24 0005

## 2024-05-30 ENCOUNTER — NURSE TRIAGE (OUTPATIENT)
Dept: NURSING | Facility: CLINIC | Age: 74
End: 2024-05-30

## 2024-05-30 ENCOUNTER — OFFICE VISIT (OUTPATIENT)
Dept: INTERNAL MEDICINE | Facility: CLINIC | Age: 74
End: 2024-05-30
Payer: COMMERCIAL

## 2024-05-30 VITALS
BODY MASS INDEX: 20.63 KG/M2 | OXYGEN SATURATION: 98 % | HEART RATE: 103 BPM | WEIGHT: 152.3 LBS | SYSTOLIC BLOOD PRESSURE: 105 MMHG | DIASTOLIC BLOOD PRESSURE: 73 MMHG | TEMPERATURE: 97.9 F | HEIGHT: 72 IN

## 2024-05-30 DIAGNOSIS — K59.09 OTHER CONSTIPATION: ICD-10-CM

## 2024-05-30 DIAGNOSIS — R33.8 ACUTE URINARY RETENTION: Primary | ICD-10-CM

## 2024-05-30 PROCEDURE — 99214 OFFICE O/P EST MOD 30 MIN: CPT | Mod: GC

## 2024-05-30 NOTE — PROGRESS NOTES
Verbal orders from Dr. Diaz and Dr. Avitia to remove pt's catheter.     10cc saline removed from balloon as documented on catheter. Catheter removed easily, intact upon visible inspection.     Pt educated on need to drink fluids as he will need to void prior to leaving clinic.     Pt and family aware of plan of care.

## 2024-05-30 NOTE — DISCHARGE INSTRUCTIONS
Follow-up tomorrow with your physician as scheduled.  They may take your catheter out and provide a trial of void at that time.    MiraLAX as directed.  Tomorrow, you can try taking this every 20 minutes for up to 6 doses to see if this stimulates a bowel movement.  If you do begin to have a bowel movement, stop taking and begin taking once a day thereafter.    Senokot and Colace as directed.    Return to the emergency department for fevers, severe pain, blood in stools, or any other problems.

## 2024-05-30 NOTE — NURSING NOTE
Pt was able to urinate before leaving clinic. Catheter stat lock was removed from patient's left upper leg using alcohol wipes. Pt and wife were informed to contact clinic if any further concerns arise.     PRAVEEN Chavez at 2:53 PM on 5/30/2024

## 2024-05-30 NOTE — PROGRESS NOTES
Assessment & Plan     Jefry is a 73 year old, with a history of anxiety disorder on nortriptyline, presenting for ER F/U (Pt here to follow up on urinary rentention, constipation; went to ED 5/29/2024).    Acute urinary retention  Secondary to ureteral obstruction from constipation and likely some degree of anticholinergic effects from nortriptyline. Pt seen in ED on 5/29 and found to have about 750cc retaining with relief after insertion of covington catheter. Preliminary blood work is reassuring.   - Will remove indwelling urinary catheter (Covington) and perform voiding trial  - Counseled patient to meet with primary Psych to discuss reducing the dose of the TCA  - Okay to trial OTC choline per patient's wishes  - If unable to void, then will likely reinsert covington cath and have patient follow-up in 1-2 weeks for repeat assessment    Constipation, likely drug-induced  Patient with no prior history of the constipation. CT A/P done in ED showed large stool burden without evidence of an obstructive lesion. Most recent colonoscopy done on 07/2023 with reassuring findings. Most likely secondary to anticholinergic effects from TCA  - Continue bowel regimen:   -Miralax 17 g daily   -Senokot 8.6 mg daily   -Colace 100 mg BID  -If still having difficulty with bowel movement, then recommended taking Miralax and Senokot x2 daily; otherwise, can try a suppository  -Psych follow recs as above       MED REC REQUIRED  Post Medication Reconciliation Status:       No follow-ups on file.    Subjective   Jefry is a 73 year old, with a history of anxiety disorder on nortriptyline, presenting for the following health issues:  ER F/U (Pt here to follow up on urinary rentention, constipation; went to ED 5/29/2024)    History of Present Illness    Patient reports being at his relative baseline health up until several days prior when he noticed that he did not have a bowel movement.  Since 2 AM on 5/28, the patient was not able to void.   Originally, urine appointment scheduled today for follow-up in clinic regarding his issues but given the severity of his symptoms and along with chills, he was encouraged to present to the emergency room for immediate evaluation.  In the ED, the patient found to have retention of 758 mL on bladder scan and had had a Fragoso catheter placed with adequate drainage of urine.  CT of the pelvis showed abundant stool in the colon with no evidence of an obstructing lesion so was given aggressive bowel regimen which allowed him to have a profound bowel movement.  Preliminary blood work was also reassuring.  Patient was discharged home directly from the ED with a Fragoso bag in place with close follow-up with PCP.    Since being discharged from the hospital, the patient has not had a bowel movement yet but he has not started the prescribed bowel regimen.  He denies any nausea or vomiting; he is able to tolerate his dinner last night.  Patient reports that his primary psychiatrist increased the dose of his sertraline to 125 mcg several weeks ago; reports at that time he was able to urinate without any difficulty.    Wife reports patient having other profound anticholinergic effects including dry eyes, intermittent blurry vision, dry mouth.  No fevers chills, chest pain, shortness of breath, headache.        Review of Systems  Constitutional, neuro, ENT, endocrine, pulmonary, cardiac, gastrointestinal, genitourinary, musculoskeletal, integument and psychiatric systems are negative, except as otherwise noted.      Objective    /73 (BP Location: Right arm, Patient Position: Sitting, Cuff Size: Adult Regular)   Pulse 103   Temp 97.9  F (36.6  C) (Oral)   Ht 1.829 m (6')   Wt 69.1 kg (152 lb 4.8 oz)   SpO2 98%   BMI 20.66 kg/m    Body mass index is 20.66 kg/m .  Physical Exam   GENERAL: alert and no distress  NECK: no adenopathy, no asymmetry, masses, or scars  RESP: lungs clear to auscultation - no rales, rhonchi or  wheezes  CV: regular rate and rhythm, normal S1 S2, no S3 or S4, no murmur, click or rub, no peripheral edema  ABDOMEN: soft, nontender, no hepatosplenomegaly, no masses and bowel sounds normal  MS: no gross musculoskeletal defects noted, no edema  : Fragoso in place        Signed Electronically by: Chel Diaz MD

## 2024-05-30 NOTE — PROGRESS NOTES
ASSESSMENT/PLAN:    (M53.3) Pain of left sacroiliac joint  (primary encounter diagnosis)  Comment: exam today consistent w/ both SI and ischial pain, likely from aggravation of glut max and hamstrings while doing a hip extension; x-ray benign; will start PT for L side; precautions given; f/up prn  Plan: XR Pelvis 1/2 Views, Physical Therapy   Referral          (M25.552) Left-sided ischial pain  Comment: see above  Plan: XR Pelvis 1/2 Views, Physical Therapy  Referral          Danish Butt MD  June 1, 2024  9:27 AM      Pt is a 73 year old male last seen on 3/16/24 for R hip pain here for :     HPI:   L Hip pain :   Location? Lateral hip  Duration? 3 weeks   Injury/ Inciting activity? No   Pop? None   Swelling/Bruising? None   Limited motion? None   Snapping/ Clicking? None   Giving way/ instability? None   Numbness/Tingling? None  Imaging? XR lumbar spine 2019   Treatment? No treatment thus far     Per [email or web address] message on 5/12/24:  Dear Dr Butt, my work with Noris/PT is going well. My right hip lower back is improving. What I have noteced ovedr the past couple of weeks is that I am now feeling more of the soreneess/discomfort in the legt hip area. I asked noris about it and she indicated w/o an eval she can't do anything because the order was for the right hip. Again I really think she is great . I wondered whether you would conside taking or look or would you say leave well enough alone. Jefry Hatfield     Past Medical History:   Diagnosis Date    Anisometropia     Cataract, right eye     Cervical radiculopathy     High myopia     Myopic degeneration     Posterior staphyloma     LE    Pseudophakia of left eye     Retinal detachment RE [id number]    BE      Current Outpatient Medications   Medication Sig Dispense Refill    CALCIUM-MAG-VIT C-VIT D PO Take by mouth 2 times daily      docusate sodium (COLACE) 100 MG capsule Take 1 capsule (100 mg) by mouth 2 times daily 60 capsule 2     ibuprofen (ADVIL/MOTRIN) 200 MG capsule Take 200 mg by mouth      nortriptyline (PAMELOR) 25 MG capsule Take 125 mg by mouth daily  2    Omega-3 Fatty Acids (OMEGA-3 FISH OIL PO) Take by mouth daily      polyethylene glycol (MIRALAX) 17 GM/Dose powder Take 17 g (1 Capful) by mouth daily 850 g 0    Saw Palmetto, Serenoa repens, (SAW PALMETTO EXTRACT PO) Take by mouth daily      senna (SENOKOT) 8.6 MG tablet Take 1 tablet by mouth daily 30 tablet 3    tacrolimus (PROTOPIC) 0.1 % external ointment APPLY TOPICALLY 2 TIMES DAILY 60 g 2    VITAMIN D, CHOLECALCIFEROL, PO Take by mouth daily        No Known Allergies   ROS:   Gen- no fevers/chills   Rheum - no morning stiffness   Derm - no rash/ redness   Neuro - no numbness, no tingling   Remainder of ROS negative.     Exam:       L Hip:   Inspection: Swelling - NO; Bruising - NO  ROM: Flexion -full; Extension - full; ER - full; IR -full; Abduction -full; Adduction full  Strength: Full in all planes; Pain with resisted abduction and extension, as well as knee flexion  Tenderness: Trochanter - YES ASIS - NO; Inguinal Ligament - no; Pubic Symphysis - no; Ischial Tuberosity- YES; Hamstring - YES; SI Joint - YES.   Maneuvers: ZOLTAN - POS for SI/glut pain; FADIR - POS for SI/glut pain; Niall - neg; Grind - Neg; SI joint - POS; Trendelenburg - POS      [place] [facility] [place] personally reviewed with patient at time of visit     My impression: Neg for acute hip/SI/ischial pathology

## 2024-05-30 NOTE — PATIENT INSTRUCTIONS
Jefry Hatfield, it was a pleasure seeing you in clinic today (May 30, 2024). To briefly summarize our plan moving forward:    1) Please intake coffee, warm fluids, and try other stimulating methods to urinate. If you are able to urinate without any issues, then please give us a call at [phone] and ask to speak to Sunshine NAVARRETE and no need to follow-up. If you aren't able to urinate, then return to clinic around 4 PM to be reevaluated for a covington placement.     2) Follow up with your primary Psychiatrist about reducing your nortriptriyline dose. I recommend reducing the dose for now to 100 mg daily.    3) Take the bowel regimen you were prescribed. If you are still not having a bowel movement, then you can take the Miralax and and Senokot x 2 day. If still not having a bowel movement, then can try a suppository.    If you have any further questions or concerns, please call the clinic ([phone]) or send a message via the [employer] feature.   Thank you for entrusting me with your care, and I look forward to seeing you at your next clinic visit.    Chel Diaz MD  Internal Medicine  PGY-1  [facility]

## 2024-05-30 NOTE — TELEPHONE ENCOUNTER
Pt's wife calling with concerns for no urine in his covington catheter he was just sent home with. Pt was seen in the ER this evening and had a leg bag placed. Wife states there was urine in it before they left but it is no longer draining. There is urine in the tubing but nothing in the bag now. Attempted to have wife insert air into the bag. This was unsuccessful. Tried to have her reposition the tubing but she did not want to unhook it from his leg. Suggested trying to apply a suction at the end of the tubing for the bag. Before this was done, she was unhooking the catheter from the bag. This did release the urine. Encouraged her to tried to put suction on the tubing when she reinserted it to create a pulling pressure. She is going to watch it.    Additional Information   Negative: Shock suspected (e.g., cold/pale/clammy skin, too weak to stand, low BP, rapid pulse)   Negative: Sounds like a life-threatening emergency to the triager   Negative: [1] Catheter was accidentally pulled-out AND [2] bright red continuous bleeding   Negative: Patient sounds very sick or weak to the triager    Protocols used: Urinary Catheter Symptoms and [id number] Honeycutt RN  [facility] Nurse Advisor   5/30/2024  12:09 AM

## 2024-06-01 ENCOUNTER — OFFICE VISIT (OUTPATIENT)
Dept: ORTHOPEDICS | Facility: CLINIC | Age: 74
End: 2024-06-01
Payer: COMMERCIAL

## 2024-06-01 ENCOUNTER — ANCILLARY PROCEDURE (OUTPATIENT)
Dept: GENERAL RADIOLOGY | Facility: CLINIC | Age: 74
End: 2024-06-01
Attending: FAMILY MEDICINE
Payer: COMMERCIAL

## 2024-06-01 DIAGNOSIS — M25.552 LEFT-SIDED ISCHIAL PAIN: ICD-10-CM

## 2024-06-01 DIAGNOSIS — M53.3 PAIN OF LEFT SACROILIAC JOINT: Primary | ICD-10-CM

## 2024-06-01 DIAGNOSIS — M53.3 PAIN OF LEFT SACROILIAC JOINT: ICD-10-CM

## 2024-06-01 PROCEDURE — 99213 OFFICE O/P EST LOW 20 MIN: CPT | Performed by: FAMILY MEDICINE

## 2024-06-01 PROCEDURE — 72170 X-RAY EXAM OF PELVIS: CPT | Mod: GC | Performed by: RADIOLOGY

## 2024-06-01 NOTE — LETTER
6/1/2024      RE: Jefry Hatfield  [address]  [place] [address]     Dear Colleague,    Thank you for referring your patient, Jefry Hatfield, to the [facility]. Please see a copy of my visit note below.    ASSESSMENT/PLAN:    (M53.3) Pain of left sacroiliac joint  (primary encounter diagnosis)  Comment: exam today consistent w/ both SI and ischial pain, likely from aggravation of glut max and hamstrings while doing a hip extension; x-ray benign; will start PT for L side; precautions given; f/up prn  Plan: XR Pelvis 1/2 Views, Physical Therapy   Referral          (M22.765) Left-sided ischial pain  Comment: see above  Plan: XR Pelvis 1/2 Views, Physical Therapy  Referral          Danish Butt MD  June 1, 2024  9:27 AM      Pt is a 73 year old male last seen on 3/16/24 for R hip pain here for :     HPI:   L Hip pain :   Location? Lateral hip  Duration? 3 weeks   Injury/ Inciting activity? No   Pop? None   Swelling/Bruising? None   Limited motion? None   Snapping/ Clicking? None   Giving way/ instability? None   Numbness/Tingling? None  Imaging? XR lumbar spine 2019   Treatment? No treatment thus far     Per [employer] message on 5/12/24:  Dear Dr Butt, my work with Noris/PT is going well. My right hip lower back is improving. What I have noteced ovedr the past couple of weeks is that I am now feeling more of the soreneess/discomfort in the legt hip area. I asked noris about it and she indicated w/o an eval she can't do anything because the order was for the right hip. Again I really think she is great . I wondered whether you would conside taking or look or would you say leave well enough alone. Jefry Hatfield     Past Medical History:   Diagnosis Date     Anisometropia      Cataract, right eye      Cervical radiculopathy      High myopia      Myopic degeneration      Posterior staphyloma     LE     Pseudophakia of left eye       Retinal detachment RE 1971,[id number]    BE      Current Outpatient Medications   Medication Sig Dispense Refill     CALCIUM-MAG-VIT C-VIT D PO Take by mouth 2 times daily       docusate sodium (COLACE) 100 MG capsule Take 1 capsule (100 mg) by mouth 2 times daily 60 capsule 2     ibuprofen (ADVIL/MOTRIN) 200 MG capsule Take 200 mg by mouth       nortriptyline (PAMELOR) 25 MG capsule Take 125 mg by mouth daily  2     Omega-3 Fatty Acids (OMEGA-3 FISH OIL PO) Take by mouth daily       polyethylene glycol (MIRALAX) 17 GM/Dose powder Take 17 g (1 Capful) by mouth daily 850 g 0     Saw Palmetto, Serenoa repens, (SAW PALMETTO EXTRACT PO) Take by mouth daily       senna (SENOKOT) 8.6 MG tablet Take 1 tablet by mouth daily 30 tablet 3     tacrolimus (PROTOPIC) 0.1 % external ointment APPLY TOPICALLY 2 TIMES DAILY 60 g 2     VITAMIN D, CHOLECALCIFEROL, PO Take by mouth daily        No Known Allergies   ROS:   Gen- no fevers/chills   Rheum - no morning stiffness   Derm - no rash/ redness   Neuro - no numbness, no tingling   Remainder of ROS negative.     Exam:       L Hip:   Inspection: Swelling - NO; Bruising - NO  ROM: Flexion -full; Extension - full; ER - full; IR -full; Abduction -full; Adduction full  Strength: Full in all planes; Pain with resisted abduction and extension, as well as knee flexion  Tenderness: Trochanter - YES ASIS - NO; Inguinal Ligament - no; Pubic Symphysis - no; Ischial Tuberosity- YES; Hamstring - YES; SI Joint - YES.   Maneuvers: ZOLTAN - POS for SI/glut pain; FADIR - POS for SI/glut pain; Niall - neg; Grind - Neg; SI joint - POS; Trendelenburg - POS      Xray of pelvis on June 1, 2024 at [facility] location - films personally reviewed with patient at time of visit     My impression: Neg for acute hip/SI/ischial pathology       Again, thank you for allowing me to participate in the care of your patient.      Sincerely,    Danish Butt MD

## 2024-06-08 ENCOUNTER — HEALTH MAINTENANCE LETTER (OUTPATIENT)
Age: 74
End: 2024-06-08

## 2024-06-24 ENCOUNTER — THERAPY VISIT (OUTPATIENT)
Dept: PHYSICAL THERAPY | Facility: CLINIC | Age: 74
End: 2024-06-24
Attending: FAMILY MEDICINE
Payer: COMMERCIAL

## 2024-06-24 DIAGNOSIS — M53.3 PAIN OF LEFT SACROILIAC JOINT: ICD-10-CM

## 2024-06-24 DIAGNOSIS — M25.552 LEFT-SIDED ISCHIAL PAIN: ICD-10-CM

## 2024-06-24 PROCEDURE — 97161 PT EVAL LOW COMPLEX 20 MIN: CPT | Mod: GP | Performed by: PHYSICAL THERAPIST

## 2024-06-24 PROCEDURE — 97140 MANUAL THERAPY 1/> REGIONS: CPT | Mod: GP | Performed by: PHYSICAL THERAPIST

## 2024-06-24 ASSESSMENT — ACTIVITIES OF DAILY LIVING (ADL)
GOING_DOWN_1_FLIGHT_OF_STAIRS: NO DIFFICULTY AT ALL
RECREATIONAL_ACTIVITIES: SLIGHT DIFFICULTY
SITTING_FOR_15_MINUTES: NO DIFFICULTY AT ALL
HOS_ADL_ITEM_SCORE_TOTAL: 55
WALKING_INITIALLY: NO DIFFICULTY AT ALL
TWISTING/PIVOTING_ON_INVOLVED_LEG: MODERATE DIFFICULTY
PUTTING_ON_SOCKS_AND_SHOES: SLIGHT DIFFICULTY
HEAVY_WORK: MODERATE DIFFICULTY
WALKING_UP_STEEP_HILLS: SLIGHT DIFFICULTY
DEEP_SQUATTING: NO DIFFICULTY AT ALL
HOS_ADL_SCORE(%): 80.88
STEPPING_UP_AND_DOWN_CURBS: NO DIFFICULTY AT ALL
LIGHT_TO_MODERATE_WORK: SLIGHT DIFFICULTY
WALKING_APPROXIMATELY_10_MINUTES: SLIGHT DIFFICULTY
GETTING_INTO_AND_OUT_OF_AN_AVERAGE_CAR: SLIGHT DIFFICULTY
ROLLING_OVER_IN_BED: SLIGHT DIFFICULTY
STANDING_FOR_15_MINUTES: SLIGHT DIFFICULTY
WALKING_DOWN_STEEP_HILLS: SLIGHT DIFFICULTY
HOS_ADL_HIGHEST_POTENTIAL_SCORE: 68
WALKING_15_MINUTES_OR_GREATER: SLIGHT DIFFICULTY
GOING_UP_1_FLIGHT_OF_STAIRS: NO DIFFICULTY AT ALL
HOS_ADL_COUNT: 17
GETTING_INTO_AND_OUT_OF_A_BATHTUB: NO DIFFICULTY AT ALL

## 2024-06-24 NOTE — PROGRESS NOTES
PHYSICAL THERAPY EVALUATION  Type of Visit: Evaluation       Fall Risk Screen:  Fall screen completed by: PT  Have you fallen 2 or more times in the past year?: No  Have you fallen and had an injury in the past year?: No  Is patient a fall risk?: No    Subjective  Jefry reports onset of left hip pain on November 6, 2023 of insidous nature. Since then, left lateral hip pain comes and goes. Pain level 2-3/10, intermittent, achey, no radiation, better over time, worse, prolonged sitting.  Left SIJ pain on November 6, 2023 of insidous nature. Pain comes and does not radiate. Pain level: 2-3/10, intermittent, achey, better with resting, exercise, worse, prolonged sitting       Presenting condition or subjective complaint:    Date of onset: 11/06/23    Relevant medical history:     Dates & types of surgery:     Anxiety: prescribed medication and seeking psychological support   Prior diagnostic imaging/testing results:       Prior therapy history for the same diagnosis, illness or injury:        Prior Level of Function  Transfers: Independent  Ambulation: Independent  ADL: Independent  IADL:  sitting     Living Environment  Social support:     Type of home:     Stairs to enter the home:         Ramp:     Stairs inside the home:         Help at home:    Equipment owned:       Employment:      Hobbies/Interests:      Patient goals for therapy:      Pain assessment:  see subjective      Objective   LUMBAR SPINE EVALUATION  PAIN:  see subjective   INTEGUMENTARY (edema, incisions):  na  POSTURE:  na  GAIT:   Weightbearing Status:  FWB  Assistive Device(s): None  Gait Deviations: WNL  BALANCE/PROPRIOCEPTION:  not able to balance comfortable on each side   WEIGHTBEARING ALIGNMENT:  na  NON-WEIGHTBEARING ALIGNMENT:  na   ROM:   (Degrees) Left AROM Left PROM  Right AROM Right PROM   Hip Flexion 115  115    Hip Extension 10  10    Hip Abduction 15  15    Hip Adduction       Hip Internal Rotation 10  10    Hip External Rotation 25   30    Knee Flexion wnl      Knee Extension wnl      Lumbar Side glide     Lumbar Flexion    Lumbar Extension    Pain:   End feel:   PELVIC/SI SCREEN:  right posterior psis   STRENGTH:  poor left gluteus ted contraction and fair right , mmt: hip flexors: 4+/5 (L), 5-/5 (R) gluteus medius: 4/5 (R0, 4-/5 (L), hip extensors; 4+/5 (R), 4/5 (l), hip ir: 5/5 (B), hip er: 5/5 (B), hamstrings: 4+/5 (L) 5-/5 (R)    MYOTOMES: na  DTR S: na  CORD SIGNS: na  DERMATOMES: na  NEURAL TENSION:  na  FLEXIBILITY: left greater than right piriformis and hamstring muscle tightness   LUMBAR/HIP Special Tests:  na  PELVIS/SI SPECIAL TESTS:  right posterior psis with forward bend test positive (R) in sitting  FUNCTIONAL TESTS: na  PALPATION:  moderate r lumbar extensor, left piriformis, left gluteus medius muscle tightness and moderate left greater than right loss of gluteus ted muscle tone  SPINAL SEGMENTAL CONCLUSIONS:  na      Assessment & Plan   CLINICAL IMPRESSIONS  Medical Diagnosis: Pain of left sacroiliac joint    Treatment Diagnosis: Pain of left sacroiliac joint   Impression/Assessment: Patient is a 73 year old male with left sacroiliac joint and left lateral hip  complaints.  The following significant findings have been identified: Pain, Decreased ROM/flexibility, Decreased strength, Impaired balance, Impaired muscle performance, and Decreased activity tolerance. These impairments interfere with their ability to perform  sitting   as compared to previous level of function.     Clinical Decision Making (Complexity):  Clinical Presentation: Stable/Uncomplicated  Clinical Presentation Rationale: based on medical and personal factors listed in PT evaluation  Clinical Decision Making (Complexity): Low complexity    PLAN OF CARE  Treatment Interventions:  Modalities: Contrast Bath, Hot Pack  Interventions: Manual Therapy, Neuromuscular Re-education, Therapeutic Activity, Therapeutic Exercise, Self-Care/Home Management    Long  Term Goals     PT Goal 1  Goal Identifier: sitting tolerance one hour  Goal Description: sitting tolerance one hour 0/10 pain level, presently 2-3/10 (SIJ and hip)  Rationale:  (sit pain free)  Target Date: 08/19/24      Frequency of Treatment: 1x/week  Duration of Treatment: 8 weeks    Recommended Referrals to Other Professionals: none needed   Education Assessment:        Risks and benefits of evaluation/treatment have been explained.   Patient/Family/caregiver agrees with Plan of Care.     Evaluation Time:             Signing Clinician: Jing Osuna, PT        [facility]                                                                                   OUTPATIENT PHYSICAL THERAPY      PLAN OF TREATMENT FOR OUTPATIENT REHABILITATION   Patient's Last Name, First Name, LITOBRIAN HatfieldJefry  David YOB: 1950   Provider's Name   [facility]   Medical Record No.  [id number]     Onset Date: 11/06/23  Start of Care Date: 06/24/24     Medical Diagnosis:  Pain of left sacroiliac joint      PT Treatment Diagnosis:  Pain of left sacroiliac joint Plan of Treatment  Frequency/Duration: 1x/week/ 8 weeks    Certification date from 06/24/24 to 08/19/24         See note for plan of treatment details and functional goals     Jing Osuna, PT                         I CERTIFY THE NEED FOR THESE SERVICES FURNISHED UNDER        THIS PLAN OF TREATMENT AND WHILE UNDER MY CARE     (Physician attestation of this document indicates review and certification of the therapy plan).              Referring Provider:  Danish Butt    Initial Assessment  See Epic Evaluation- Start of Care Date: 06/24/24

## 2024-07-03 ENCOUNTER — THERAPY VISIT (OUTPATIENT)
Dept: PHYSICAL THERAPY | Facility: CLINIC | Age: 74
End: 2024-07-03
Attending: FAMILY MEDICINE
Payer: COMMERCIAL

## 2024-07-03 DIAGNOSIS — M25.552 LEFT-SIDED ISCHIAL PAIN: ICD-10-CM

## 2024-07-03 DIAGNOSIS — M53.3 PAIN OF LEFT SACROILIAC JOINT: Primary | ICD-10-CM

## 2024-07-03 PROCEDURE — 97112 NEUROMUSCULAR REEDUCATION: CPT | Mod: GP | Performed by: PHYSICAL THERAPIST

## 2024-07-03 PROCEDURE — 97140 MANUAL THERAPY 1/> REGIONS: CPT | Mod: GP | Performed by: PHYSICAL THERAPIST

## 2024-07-06 ENCOUNTER — MYC MEDICAL ADVICE (OUTPATIENT)
Dept: INTERNAL MEDICINE | Facility: CLINIC | Age: 74
End: 2024-07-06
Payer: COMMERCIAL

## 2024-07-08 NOTE — TELEPHONE ENCOUNTER
Patient confirmed scheduled appointment:  Date: 7/9/2024  Time: 730am  Visit type: In person  Provider: Dr. See  Location: [address] [place] [address]   Testing/imaging: -  Additional notes: -

## 2024-07-08 NOTE — PROGRESS NOTES
HPI:    He had his nortriptyline increased to 125 mg for the last 2 weeks and now has a dry and mouth and sore throat. He is followed carefully by his Psychiatrist. He has been as low as 25 mg of nortriptyline for anxiety. He has tried several other medications for anxiety as well. He has taken PRN Ativan for more acute sxs.  No sick contacts. He has decreased saliva production but feels this may be getting better with reduction of nortriptyline from 125 mg to 100 mg. He does not smoke, no EtOH abuse. He does feel he has reflux and has not used any antacids. No other HEENT, cardiopulmonary, abdominal, , neurological, systemic, psychiatric, lymphatic, endocrine, vascular complaints.     Past Medical History:   Diagnosis Date    Anisometropia     Cataract, right eye     Cervical radiculopathy     High myopia     Myopic degeneration     Posterior staphyloma     LE    Pseudophakia of left eye     Retinal detachment RE 1971,[id number]    BE     Past Surgical History:   Procedure Laterality Date    APPENDECTOMY      CATARACT IOL, RT/LT Bilateral 11/14/12LE 7/1/14RE    BE    COLONOSCOPY N/A 5/25/2018    Procedure: COLONOSCOPY;  Screening Colonoscopy;  Surgeon: Zak Kaplan MD;  Location: [facility] OR    COLONOSCOPY N/A 7/3/2023    Procedure: COLONOSCOPY, WITH POLYPECTOMY AND BIOPSY;  Surgeon: Nancy Silvestre MD;  Location: [facility] OR    PHACOEMULSIFICATION CLEAR CORNEA WITH STANDARD INTRAOCULAR LENS IMPLANT  7/1/2014    Procedure: PHACOEMULSIFICATION CLEAR CORNEA WITH STANDARD INTRAOCULAR LENS IMPLANT;  Surgeon: Milan Bernardo MD;  Location:  [facility]    SCLERAL BUCKLE  1971    RE    SCLERAL BUCKLE  1980    LE    YAG CAPSULOTOMY OD (RIGHT EYE)  10/30/2014     PE:    Vitals noted, gen, nad, cooperative, alert, neck supple nl rom, no B carotid bruits, oropharynx with erythema but no exudate. No neck masses, lungs with good air movement, RRR, S1, S2, no MRG, abdomen, no acute findings. Grossly normal neurological exam. No  skin rash.     XR Pelvis 1/2 Views  Narrative: 1 views pelvis radiograph(s) 6/1/2024 9:20 AM    History: L sacroiliac and ischial tuberosity pain; Pain of left  sacroiliac joint; Left-sided ischial pain    Additional History from EMR: 3 weeks, left hip pain with extension.    Comparison: CT abdomen and pelvis 5/29/2024, CT abdomen and pelvis  4/29/2018.    Findings:  AP view(s) of the pelvis were obtained.     No change in the relative lucency of the sacral medullary bone since  2018. Symmetric Paget's disease involving the sacrum cannot be  excluded.    Minimal sclerosis of the SI joint as can be seen in sacroiliitis.    Overlying bowel gas/fecal content within the lower pelvis.  Nonobstructive bowel gas pattern.    Pelvic phlebolith.  Impression: Impression:   1. No acute osseous abnormality.  2. There is diffuse symmetric demineralization with coarsening of  trabecula and minimal sclerosis in the nonsclerotic area of the bony  anatomy of the sacrum, as has been seen on previous CT scans dating  back to 2018. Unusual presentation of Padget's disease cannot be  excluded. No fracture.    I have personally reviewed the examination and initial interpretation  and I agree with the findings.    MALIKA STAPLES MD         SYSTEM ID:  [id number]    Results for orders placed or performed in visit on 07/09/24   Streptococcus A Rapid Screen w/Reflex to PCR - Clinic Collect     Status: Normal    Specimen: Throat; Swab   Result Value Ref Range    Group A Strep antigen Negative Negative       A/P:     1. Pelvic Health PT  6/28/2023  with Ms. Rush. He states that his urinary symptoms and erectile dysfunction are less getting this pelvic PT. He wants to continue with this before seeing Urology or starting Viagara.   2. Dermatology appt. With Dr. Garcia seen 12/13/2022  3. Immunizations; He has completed the Shingrix vaccine series Tdap 3/27/2018. Prevnar 13 done 3/27/2018. COVID Moderna vaccine x 5; Pfizer x 1.    4. Colonoscopy;   was done 7/3/2023. Could consider repeat in 5-10 years   5. PSA; 1.53 on 10/21/2022.   6. Lipids; 10/21/2022; TG's 53, HDL 59 and    7. B Iliotibal band syndrome, seen 11/14/2022 by Dr. Butt. Last visit with Dr. Butt 6/1/2024 for sacroiliac joint issues. He is getting PT.   8. Vitamin D level 44 on 10/21/2022  9. Sore throat may well be due to anticholinergic effect from nortriptyline. Ordered strep test. Telephone visit with me in 3 weeks. If no change in sxs after reducing his nortripyline to 100 mg, ENT referral, possible neck CT imaging. Doubt autoimmune saliva gland process. Dicussed reflux (silent) can cause sore throat. He will continue to follow with his outside psychiatric team       30 minutes spent on the date of the encounter doing chart review, history and exam, documentation and further activities as noted above exclusive of procedures and other billable interpretations

## 2024-07-09 ENCOUNTER — OFFICE VISIT (OUTPATIENT)
Dept: INTERNAL MEDICINE | Facility: CLINIC | Age: 74
End: 2024-07-09
Payer: COMMERCIAL

## 2024-07-09 VITALS
SYSTOLIC BLOOD PRESSURE: 143 MMHG | HEART RATE: 83 BPM | OXYGEN SATURATION: 99 % | WEIGHT: 152.5 LBS | DIASTOLIC BLOOD PRESSURE: 84 MMHG | BODY MASS INDEX: 20.68 KG/M2

## 2024-07-09 DIAGNOSIS — J02.9 SORE THROAT: Primary | ICD-10-CM

## 2024-07-09 DIAGNOSIS — Z29.11 NEED FOR VACCINATION AGAINST RESPIRATORY SYNCYTIAL VIRUS: ICD-10-CM

## 2024-07-09 LAB
DEPRECATED S PYO AG THROAT QL EIA: NEGATIVE
GROUP A STREP BY PCR: ABNORMAL

## 2024-07-09 PROCEDURE — 99000 SPECIMEN HANDLING OFFICE-LAB: CPT | Performed by: PATHOLOGY

## 2024-07-09 PROCEDURE — 99214 OFFICE O/P EST MOD 30 MIN: CPT | Performed by: INTERNAL MEDICINE

## 2024-07-09 PROCEDURE — 87651 STREP A DNA AMP PROBE: CPT | Performed by: INTERNAL MEDICINE

## 2024-07-09 RX ORDER — RESPIRATORY SYNCYTIAL VIRUS VACCINE 120MCG/0.5
0.5 KIT INTRAMUSCULAR ONCE
Qty: 1 EACH | Refills: 0 | Status: CANCELLED | OUTPATIENT
Start: 2024-07-09 | End: 2024-07-09

## 2024-07-09 NOTE — PROGRESS NOTES
Jefry is a 73 year old that presents in clinic today for the following:     Chief Complaint   Patient presents with    Follow Up     2 weeks sore throat per pt     Recheck Medication     Increase in anxiety med recently, pt states the increase has made eyes and throat dry, occasionally coughing up mucus            7/9/2024     7:48 AM   Additional Questions   Roomed by MR     Screenings as of today     Fallen 2 or more times in the past year? No        Monica Gonzales, EMT at 7:51 AM on 7/9/2024

## 2024-07-18 ENCOUNTER — THERAPY VISIT (OUTPATIENT)
Dept: OCCUPATIONAL THERAPY | Facility: CLINIC | Age: 74
End: 2024-07-18
Attending: OPHTHALMOLOGY
Payer: COMMERCIAL

## 2024-07-18 DIAGNOSIS — H15.833 POSTERIOR STAPHYLOMA, BILATERAL: Primary | ICD-10-CM

## 2024-07-18 PROCEDURE — 97535 SELF CARE MNGMENT TRAINING: CPT | Mod: GO | Performed by: OCCUPATIONAL THERAPIST

## 2024-07-25 ENCOUNTER — THERAPY VISIT (OUTPATIENT)
Dept: OCCUPATIONAL THERAPY | Facility: CLINIC | Age: 74
End: 2024-07-25
Attending: OPHTHALMOLOGY
Payer: COMMERCIAL

## 2024-07-25 DIAGNOSIS — H15.833 POSTERIOR STAPHYLOMA, BILATERAL: Primary | ICD-10-CM

## 2024-07-25 PROCEDURE — 97535 SELF CARE MNGMENT TRAINING: CPT | Mod: GO | Performed by: OCCUPATIONAL THERAPIST

## 2024-07-31 ENCOUNTER — TELEPHONE (OUTPATIENT)
Dept: INTERNAL MEDICINE | Facility: CLINIC | Age: 74
End: 2024-07-31

## 2024-07-31 ENCOUNTER — VIRTUAL VISIT (OUTPATIENT)
Dept: INTERNAL MEDICINE | Facility: CLINIC | Age: 74
End: 2024-07-31
Payer: COMMERCIAL

## 2024-07-31 DIAGNOSIS — J02.9 SORE THROAT: Primary | ICD-10-CM

## 2024-07-31 DIAGNOSIS — M54.2 NECK PAIN: ICD-10-CM

## 2024-07-31 PROCEDURE — 99442 PR PHYSICIAN TELEPHONE EVALUATION 11-20 MIN: CPT | Mod: 93 | Performed by: INTERNAL MEDICINE

## 2024-07-31 NOTE — PATIENT INSTRUCTIONS
Thank you for visiting the Primary Care Center today at the [facility]! The following is some information about our clinic:     Primary Care Center Frequently-Asked Questions    (1) How do I schedule appointments at the [facility]?     Primary Care--to schedule or make changes to an existing appointment, please call our primary care line at [phone].    Labs--to schedule a lab appointment at the [facility] you can use [employer] or call [phone]. If you have a [facility] location that is closer to home, you can reach out to that location for scheduling options.     Imaging--if you need to schedule a CT, X-ray, MRI, ultrasound, or other imaging study you can call [phone] to schedule at the [facility] or any other [facility] imaging location.     Referrals--if a referral to another specialty was ordered you can expect a phone call from their scheduling team. If you have not heard from them in a week, please call us or send us a [employer] message to check the status or get a scheduling number. Please note that this only applies to internal [facility] referrals. If the referral is external you would need to contact their office for scheduling.     (2) I have a question about my visit, who do I contact?     You can call us at the primary care line at [phone] to ask questions about your visit. You can also send a secure message through [employer], which is reviewed by clinic staff. Please note that [employer] messages have a twenty-four to forty-eight business hour turnaround time and should not be used for urgent concerns.    (3) How will I get the results of my tests?    If you are signed up for [employer] all tests will be released to you within twenty-four hours of resulting. Please allow three to five days for your doctor to review your results and place a note interpreting the results. If you do not have [employer] you will receive your  results through mail seven to ten business days following the return of the tests. Please note that if there should be any urgent or concerning results that your doctor or their registered nurse will reach out to you the same day as the tests come back. If you have follow up questions about your results or would like to discuss the results in detail please schedule a follow up with your provider either in person or virtually.     (4) How do I get refills of my prescriptions?     You should always first contact your pharmacy for refills of your medications. If submitting a refill request on [employer], please be sure to submit the request only once--repeat requests can cause delays in refill. If you are requesting a NEW medication or a medication related to new symptoms you will need to schedule an appointment with a provider prior to approval. Please note: Routine medication refills have up to one to three business day turnaround whereas controlled substances refills have up to five to seven business day turnaround.    (5) I have new symptoms, what do I do?     If you are having an immediate medical emergency, you should dial 911 for assistance.   For anything urgent that needs to be seen within a few hours to one day you should visit a local urgent care for assistance.  For non-urgent symptoms that need to be seen within a few days to a week you can schedule with an available provider in primary care by going to [employer] or calling [phone].   If you are not sure how serious your symptoms are or you would like to receive medical advice you can always call [phone] to speak with a triage nurse.

## 2024-07-31 NOTE — PROGRESS NOTES
Phone call duration: 12 minutes  Signed Electronically by: Varinder See MD        Telephone visit     Mr. Hatfield agrees to a telephone visit     Last in-person visit with me was 7/9/2024    Still with dry mouth and mild sore throat. He has titrated down to 100 mg of Nortriptyline. He does not feel he has post nasal drip sxs. Nor reflux. He is not on a proton pump inhibitor. Plan to get neck CT imaging and ENT evaluation. Telephone visit with me in one month    ESCOBAR See

## 2024-07-31 NOTE — TELEPHONE ENCOUNTER
Left Voicemail (1st Attempt) and Sent Mychart (1st Attempt) for the patient to call back and schedule the following:    Appointment type: CT  Provider: per PCP  Return date: any  Specialty phone number: [phone]

## 2024-08-06 ENCOUNTER — TELEPHONE (OUTPATIENT)
Dept: INTERNAL MEDICINE | Facility: CLINIC | Age: 74
End: 2024-08-06
Payer: COMMERCIAL

## 2024-08-06 NOTE — TELEPHONE ENCOUNTER
Left Voicemail (2nd Attempt) for the patient to call back and schedule the following:    Appointment type: CT soft tissue  Provider: per PCP  Return date: any  Specialty phone number: [phone]

## 2024-08-08 ENCOUNTER — TELEPHONE (OUTPATIENT)
Dept: INTERNAL MEDICINE | Facility: CLINIC | Age: 74
End: 2024-08-08
Payer: COMMERCIAL

## 2024-08-08 NOTE — TELEPHONE ENCOUNTER
Patient confirmed scheduled appointment:  Date: 8/28/2024  Time: 11:30am  Visit type: telephone  Provider: PCP

## 2024-08-16 ENCOUNTER — MYC MEDICAL ADVICE (OUTPATIENT)
Dept: INTERNAL MEDICINE | Facility: CLINIC | Age: 74
End: 2024-08-16

## 2024-08-16 ENCOUNTER — ANCILLARY PROCEDURE (OUTPATIENT)
Dept: CT IMAGING | Facility: CLINIC | Age: 74
End: 2024-08-16
Attending: INTERNAL MEDICINE
Payer: COMMERCIAL

## 2024-08-16 DIAGNOSIS — M54.2 NECK PAIN: ICD-10-CM

## 2024-08-16 DIAGNOSIS — J02.9 SORE THROAT: ICD-10-CM

## 2024-08-16 LAB
CREAT BLD-MCNC: 1.3 MG/DL (ref 0.7–1.3)
EGFRCR SERPLBLD CKD-EPI 2021: 59 ML/MIN/1.73M2

## 2024-08-16 PROCEDURE — 70491 CT SOFT TISSUE NECK W/DYE: CPT | Performed by: STUDENT IN AN ORGANIZED HEALTH CARE EDUCATION/TRAINING PROGRAM

## 2024-08-16 PROCEDURE — 82565 ASSAY OF CREATININE: CPT | Performed by: PATHOLOGY

## 2024-08-16 RX ORDER — IOPAMIDOL 755 MG/ML
90 INJECTION, SOLUTION INTRAVASCULAR ONCE
Status: COMPLETED | OUTPATIENT
Start: 2024-08-16 | End: 2024-08-16

## 2024-08-16 RX ADMIN — IOPAMIDOL 90 ML: 755 INJECTION, SOLUTION INTRAVASCULAR at 11:04

## 2024-08-16 NOTE — DISCHARGE INSTRUCTIONS

## 2024-08-19 ENCOUNTER — THERAPY VISIT (OUTPATIENT)
Dept: OCCUPATIONAL THERAPY | Facility: CLINIC | Age: 74
End: 2024-08-19
Attending: OPHTHALMOLOGY
Payer: COMMERCIAL

## 2024-08-19 DIAGNOSIS — H15.833 POSTERIOR STAPHYLOMA, BILATERAL: Primary | ICD-10-CM

## 2024-08-19 PROCEDURE — 97535 SELF CARE MNGMENT TRAINING: CPT | Mod: GO | Performed by: OCCUPATIONAL THERAPIST

## 2024-08-28 ENCOUNTER — VIRTUAL VISIT (OUTPATIENT)
Dept: INTERNAL MEDICINE | Facility: CLINIC | Age: 74
End: 2024-08-28
Payer: COMMERCIAL

## 2024-08-28 ENCOUNTER — THERAPY VISIT (OUTPATIENT)
Dept: PHYSICAL THERAPY | Facility: CLINIC | Age: 74
End: 2024-08-28
Payer: COMMERCIAL

## 2024-08-28 DIAGNOSIS — M25.552 LEFT-SIDED ISCHIAL PAIN: ICD-10-CM

## 2024-08-28 DIAGNOSIS — J02.9 SORE THROAT: Primary | ICD-10-CM

## 2024-08-28 DIAGNOSIS — M53.3 PAIN OF LEFT SACROILIAC JOINT: Primary | ICD-10-CM

## 2024-08-28 PROCEDURE — 97112 NEUROMUSCULAR REEDUCATION: CPT | Mod: GP | Performed by: PHYSICAL THERAPIST

## 2024-08-28 PROCEDURE — 97140 MANUAL THERAPY 1/> REGIONS: CPT | Mod: GP | Performed by: PHYSICAL THERAPIST

## 2024-08-28 PROCEDURE — 99442 PR PHYSICIAN TELEPHONE EVALUATION 11-20 MIN: CPT | Mod: 93 | Performed by: INTERNAL MEDICINE

## 2024-08-28 NOTE — PROGRESS NOTES
Phone call duration: 11 minutes  Signed Electronically by: Varinder See MD      Telephone visit    Mr. Hatfield agrees to a telephone visit    Last telephone visit with us was 7/31/2024     He still has a chronic sore throat. He had CT neck scan 8/16/2024 with L maxillary sinus disease. He was treated with Augmentin w/o significant benefit. He wonders if he has allergies. He has taken anti-histamines in the past that seems just dry his mouth. He wants to hold on Flonase steroid spray. He may have mouth breathing because of nasal obstruction?     CT Soft Tissue Neck w Contrast  Narrative: CT SOFT TISSUE NECK W CONTRAST 8/16/2024 11:16 AM    History:  dry mouth, sore throat; Sore throat; Neck pain  ICD-10: Sore throat; Neck pain      Comparison:  None available.     Technique: Following intravenous administration of nonionic iodinated  contrast medium, thin section helical CT images were obtained from the  skull base down to the level of the aortic arch.  Axial, coronal and  sagittal reformations were performed with 2-3 mm slice thickness  reconstruction. Images were reviewed in soft tissue, lung and bone  windows.    Contrast: Isovue 370 90cc    Findings:   Exam is limited by metal artifact from dental implants. Enlarged uvula  which impedes the nasopharyngeal air column. The tongue base appears  normal. The parotid glands appear atrophic with homogeneous density.  The submandibular and thyroid glands appear normal.    There is no evident cervical lymphadenopathy. The fascial spaces in  the neck are intact bilaterally. The major vascular structures in the  neck appear unremarkable.    Evaluation of the osseous structures demonstrate no worrisome lytic or  sclerotic lesion. Disc height loss at C5-6 and C6-7, most  significantly there is moderate left foraminal stenosis at C5-6.  Frothy heterogeneous opacification of left maxillary sinus. Bilateral  conchae bullosa. No periapical abscesses of the teeth. The  mastoid air  cells are clear. Bilateral scleral brennan and pseudophakia.    The visualized lung apices are clear.    There is no hemorrhage or midline shift in the visualized portion of  the brain. Partially visualized megacisterna magna.  Impression: Impression: Acute left maxillary sinusitis.    I have personally reviewed the examination and initial interpretation  and I agree with the findings.    YEN HAAS MD         SYSTEM ID:  [id number]    Will try to get him seen in ENT sooner

## 2024-08-28 NOTE — PATIENT INSTRUCTIONS
Thank you for visiting the Primary Care Center today at the [facility]! The following is some information about our clinic:     Primary Care Center Frequently-Asked Questions    (1) How do I schedule appointments at the [facility]?     Primary Care--to schedule or make changes to an existing appointment, please call our primary care line at [phone].    Labs--to schedule a lab appointment at the [facility] you can use [employer] or call [phone]. If you have a [place] location that is closer to home, you can reach out to that location for scheduling options.     Imaging--if you need to schedule a CT, X-ray, MRI, ultrasound, or other imaging study you can call [phone] to schedule at the [facility] or any other [facility] imaging location.     Referrals--if a referral to another specialty was ordered you can expect a phone call from their scheduling team. If you have not heard from them in a week, please call us or send us a [employer] message to check the status or get a scheduling number. Please note that this only applies to internal [facility] referrals. If the referral is external you would need to contact their office for scheduling.     (2) I have a question about my visit, who do I contact?     You can call us at the primary care line at [phone] to ask questions about your visit. You can also send a secure message through [employer], which is reviewed by clinic staff. Please note that [employer] messages have a twenty-four to forty-eight business hour turnaround time and should not be used for urgent concerns.    (3) How will I get the results of my tests?    If you are signed up for [employer] all tests will be released to you within twenty-four hours of resulting. Please allow three to five days for your doctor to review your results and place a note interpreting the results. If you do not have [employer] you will receive your  results through mail seven to ten business days following the return of the tests. Please note that if there should be any urgent or concerning results that your doctor or their registered nurse will reach out to you the same day as the tests come back. If you have follow up questions about your results or would like to discuss the results in detail please schedule a follow up with your provider either in person or virtually.     (4) How do I get refills of my prescriptions?     You should always first contact your pharmacy for refills of your medications. If submitting a refill request on [employer], please be sure to submit the request only once--repeat requests can cause delays in refill. If you are requesting a NEW medication or a medication related to new symptoms you will need to schedule an appointment with a provider prior to approval. Please note: Routine medication refills have up to one to three business day turnaround whereas controlled substances refills have up to five to seven business day turnaround.    (5) I have new symptoms, what do I do?     If you are having an immediate medical emergency, you should dial 911 for assistance.   For anything urgent that needs to be seen within a few hours to one day you should visit a local urgent care for assistance.  For non-urgent symptoms that need to be seen within a few days to a week you can schedule with an available provider in primary care by going to [employer] or calling [phone].   If you are not sure how serious your symptoms are or you would like to receive medical advice you can always call [phone] to speak with a triage nurse.

## 2024-08-28 NOTE — PROGRESS NOTES
PROGRESS  REPORT    Progress reporting period is from 6- to 8-.           SUBJECTIVE  Subjective changes noted by patient:   patient reports having increased left hip and sacroiliac joint pain with prolonged sitting in car ..       Current pain level is 1-2/10  .     Previous pain level was  2/10-3/10  .   Changes in function:  Yes (See Goal flowsheet attached for changes in current functional level)  Adverse reaction to treatment or activity: None    OBJECTIVE  Changes noted in objective findings:   ROM:   (Degrees) Left AROM Left PROM  Right AROM Right PROM   Hip Flexion 115   115     Hip Extension 10   10     Hip Abduction 15   15     Hip Adduction           Hip Internal Rotation 10   10     Hip External Rotation 25   30     Knee Flexion wnl         Knee Extension wnl         Lumbar Side glide       Lumbar Flexion     Lumbar Extension     Pain:   End feel:   PELVIC/SI SCREEN:  right posterior psis   STRENGTH:   mmt: hip flexors: 5- /5 (L), 5-/5 (R) gluteus medius: 4/5 (R) , 4+/5 (L), hip extensors; 4+/5 (R), 4/5-4+/5 (l), hip ir: 5/5 (B), hip er: 5/5 (B), hamstrings: 4+/5-5-/5 (L) 5-/5 (R)     MYOTOMES: na  DTR S: na  CORD SIGNS: na  DERMATOMES: na  NEURAL TENSION:  na  FLEXIBILITY: left greater than right piriformis and hamstring muscle tightness   PALPATION:  moderate r lumbar extensor, left piriformis, left gluteus medius muscle tightness and moderate left greater than right loss of gluteus ted muscle tone                      ASSESSMENT/PLAN  Updated problem list and treatment plan: Diagnosis 1:  Left ischeal pain  Pain -  hot/cold therapy, self management, education, directional preference exercise, and home program  Decreased ROM/flexibility - manual therapy, therapeutic exercise, therapeutic activity, and home program  Decreased strength - therapeutic exercise, therapeutic activities, and home program  Impaired muscle performance - neuro re-education and home program  Decreased function -  therapeutic activities and home program  Diagnosis 2:  left sij   Pain -  hot/cold therapy, self management, education, directional preference exercise, and home program  Decreased ROM/flexibility - manual therapy, therapeutic exercise, therapeutic activity, and home program  Decreased strength - therapeutic exercise, therapeutic activities, and home program  Impaired muscle performance - neuro re-education and home program  Decreased function - therapeutic activities and home program  STG/LTGs have been met or progress has been made towards goals:  Yes (See Goal flow sheet completed today.)  Assessment of Progress: The patient's condition is improving.  Self Management Plans:  Patient has been instructed in a home treatment program.  Patient  has been instructed in self management of symptoms.  I have re-evaluated this patient and find that the nature, scope, duration and intensity of the therapy is appropriate for the medical condition of the patient.  Jefry continues to require the following intervention to meet STG and LTG's:  PT    Recommendations:  This patient would benefit from continued therapy.     Frequency:  1 X week, once daily  Duration:  for 8 weeks    Please refer to the daily flowsheet for treatment today, total treatment time and time spent performing 1:1 timed codes.           [facility]                                                                                   OUTPATIENT PHYSICAL THERAPY    PLAN OF TREATMENT FOR OUTPATIENT REHABILITATION   Patient's Last Name, First Name, Jefry Garzon YOB: 1950   Provider's Name   [facility]   Medical Record No.  [id number]     Onset Date: 11/06/23  Start of Care Date: 06/24/24     Medical Diagnosis:  Pain of left sacroiliac joint      PT Treatment Diagnosis:  Pain of left sacroiliac joint Plan of Treatment  Frequency/Duration: 1x/week/ 8 weeks    Certification  date from 08/20/24 to 10/23/24         See note for plan of treatment details and functional goals     Jing Osuna, PT                         I CERTIFY THE NEED FOR THESE SERVICES FURNISHED UNDER        THIS PLAN OF TREATMENT AND WHILE UNDER MY CARE     (Physician attestation of this document indicates review and certification of the therapy plan).              Referring Provider:  Danish Butt    Initial Assessment  See Epic Evaluation- Start of Care Date: 06/24/24            PLAN  Continue therapy per current plan of care.    Beginning/End Dates of Progress Note Reporting Period:    to 08/28/2024    Referring Provider:  Danish Butt

## 2024-08-29 ENCOUNTER — THERAPY VISIT (OUTPATIENT)
Dept: OCCUPATIONAL THERAPY | Facility: CLINIC | Age: 74
End: 2024-08-29
Attending: OPHTHALMOLOGY
Payer: COMMERCIAL

## 2024-08-29 DIAGNOSIS — H15.833 POSTERIOR STAPHYLOMA, BILATERAL: Primary | ICD-10-CM

## 2024-08-29 PROCEDURE — 97535 SELF CARE MNGMENT TRAINING: CPT | Mod: GO | Performed by: OCCUPATIONAL THERAPIST

## 2024-09-12 ENCOUNTER — OFFICE VISIT (OUTPATIENT)
Dept: OTOLARYNGOLOGY | Facility: CLINIC | Age: 74
End: 2024-09-12
Attending: INTERNAL MEDICINE
Payer: COMMERCIAL

## 2024-09-12 ENCOUNTER — TELEPHONE (OUTPATIENT)
Dept: OTOLARYNGOLOGY | Facility: CLINIC | Age: 74
End: 2024-09-12

## 2024-09-12 VITALS
OXYGEN SATURATION: 100 % | HEART RATE: 89 BPM | SYSTOLIC BLOOD PRESSURE: 122 MMHG | TEMPERATURE: 97 F | HEIGHT: 72 IN | WEIGHT: 156 LBS | DIASTOLIC BLOOD PRESSURE: 83 MMHG | BODY MASS INDEX: 21.13 KG/M2

## 2024-09-12 DIAGNOSIS — R68.2 DRY MOUTH, UNSPECIFIED: ICD-10-CM

## 2024-09-12 DIAGNOSIS — J02.9 SORE THROAT: Primary | ICD-10-CM

## 2024-09-12 DIAGNOSIS — K21.9 LPRD (LARYNGOPHARYNGEAL REFLUX DISEASE): ICD-10-CM

## 2024-09-12 PROCEDURE — 31575 DIAGNOSTIC LARYNGOSCOPY: CPT

## 2024-09-12 PROCEDURE — 99203 OFFICE O/P NEW LOW 30 MIN: CPT | Mod: 25

## 2024-09-12 RX ORDER — OMEPRAZOLE 40 MG/1
CAPSULE, DELAYED RELEASE ORAL
Qty: 90 CAPSULE | Refills: 0 | Status: SHIPPED | OUTPATIENT
Start: 2024-09-12

## 2024-09-12 ASSESSMENT — PAIN SCALES - GENERAL: PAINLEVEL: MILD PAIN (2)

## 2024-09-12 NOTE — TELEPHONE ENCOUNTER
Pharmacy called and needs to know how much magic mouthwash the pt should use at at time as well as if it is swish and swallow or spit. Thank you  Geri JONES RN BSN PHN  [facility]ty Clin[facility]

## 2024-09-12 NOTE — LETTER
9/12/2024      Jefry Hatfield  [address]  [place] [address]      Dear Colleague,    Thank you for referring your patient, Jefry Hatfield, to the [facility]. Please see a copy of my visit note below.    ENT Consultation          History of Present Illness - Jefry Hatfield is a 73 year old male presents for evaluation of sore throat.    This has been going on for 6 months. He notes symptoms can be present when he eats. They describe the sore throat as fluid dripping in the back of his throat located underneath the ears. Voicing has seems more hoarse. Intermittent cough. No hemoptysis. The patient denies any recent intubations or throat procedures. They note no history of relux. No recent positive strep tests. They have tried gum and mouth wash. He was on a week of antibiotic. This hasn't helped much. No dysphagia. He is not a smoker.    He was tested for strep back on 07/09/24, which was negative.     He takes a medication for anxiety and he has been experiencing dry mouth. He has been using Xylol and gum.     CT NECK 8/16/24:   CT Soft Tissue Neck w Contrast  Narrative: CT SOFT TISSUE NECK W CONTRAST 8/16/2024 11:16 AM     History:  dry mouth, sore throat; Sore throat; Neck pain  ICD-10: Sore throat; Neck pain      Comparison:  None available.      Technique: Following intravenous administration of nonionic iodinated  contrast medium, thin section helical CT images were obtained from the  skull base down to the level of the aortic arch.  Axial, coronal and  sagittal reformations were performed with 2-3 mm slice thickness  reconstruction. Images were reviewed in soft tissue, lung and bone  windows.     Contrast: Isovue 370 90cc     Findings:   Exam is limited by metal artifact from dental implants. Enlarged uvula  which impedes the nasopharyngeal air column. The tongue base appears  normal. The parotid glands appear atrophic with homogeneous density.  The  submandibular and thyroid glands appear normal.     There is no evident cervical lymphadenopathy. The fascial spaces in  the neck are intact bilaterally. The major vascular structures in the  neck appear unremarkable.     Evaluation of the osseous structures demonstrate no worrisome lytic or  sclerotic lesion. Disc height loss at C5-6 and C6-7, most  significantly there is moderate left foraminal stenosis at C5-6.  Frothy heterogeneous opacification of left maxillary sinus. Bilateral  conchae bullosa. No periapical abscesses of the teeth. The mastoid air  cells are clear. Bilateral scleral brennan and pseudophakia.     The visualized lung apices are clear.     There is no hemorrhage or midline shift in the visualized portion of  the brain. Partially visualized megacisterna magna.  Impression: Impression: Acute left maxillary sinusitis.     I have personally reviewed the examination and initial interpretation  and I agree with the findings.     YEN HAAS MD        Past Medical History -   Past Medical History:   Diagnosis Date     Anisometropia      Cataract, right eye      Cervical radiculopathy      High myopia      Myopic degeneration      Posterior staphyloma     LE     Pseudophakia of left eye      Retinal detachment RE 1971,LE1980    BE       Current Medications -   Current Outpatient Medications:      amoxicillin-clavulanate (AUGMENTIN) 500-125 MG tablet, Take 1 tablet by mouth 2 times daily (Patient not taking: Reported on 8/28/2024), Disp: 14 tablet, Rfl: 0     CALCIUM-MAG-VIT C-VIT D PO, Take by mouth 2 times daily, Disp: , Rfl:      docusate sodium (COLACE) 100 MG capsule, Take 1 capsule (100 mg) by mouth 2 times daily, Disp: 60 capsule, Rfl: 2     ibuprofen (ADVIL/MOTRIN) 200 MG capsule, Take 200 mg by mouth (Patient not taking: Reported on 8/28/2024), Disp: , Rfl:      nortriptyline (PAMELOR) 25 MG capsule, Take 100 mg by mouth daily, Disp: , Rfl: 2     Omega-3 Fatty Acids (OMEGA-3 FISH OIL PO), Take  by mouth daily, Disp: , Rfl:      polyethylene glycol (MIRALAX) 17 GM/Dose powder, Take 17 g (1 Capful) by mouth daily, Disp: 850 g, Rfl: 0     Saw Palmetto, Serenoa repens, (SAW PALMETTO EXTRACT PO), Take by mouth daily, Disp: , Rfl:      senna (SENOKOT) 8.6 MG tablet, Take 1 tablet by mouth daily, Disp: 30 tablet, Rfl: 3     tacrolimus (PROTOPIC) 0.1 % external ointment, APPLY TOPICALLY 2 TIMES DAILY, Disp: 60 g, Rfl: 2     VITAMIN D, CHOLECALCIFEROL, PO, Take by mouth daily, Disp: , Rfl:     Allergies - No Known Allergies    Social History -   Social History     Socioeconomic History     Marital status:    Occupational History     Occupation:       Employer: [employer]   Tobacco Use     Smoking status: Never     Passive exposure: Never     Smokeless tobacco: Never   Vaping Use     Vaping status: Never Used   Substance and Sexual Activity     Alcohol use: Yes     Comment: occaisonal      Drug use: No     Sexual activity: Yes     Partners: Female   Social History Narrative    , works at [employer]     Social Determinants of Health     Interpersonal Safety: Low Risk  (7/9/2024)    Interpersonal Safety      Do you feel physically and emotionally safe where you currently live?: Yes      Within the past 12 months, have you been hit, slapped, kicked or otherwise physically hurt by someone?: No      Within the past 12 months, have you been humiliated or emotionally abused in other ways by your partner or ex-partner?: No       Family History -   Family History   Problem Relation Age of Onset     Dementia Mother      Heart Failure Father      Asthma Father      Diabetes Paternal Grandfather      Cancer No family hx of      Glaucoma No family hx of      Macular Degeneration No family hx of      Skin Cancer No family hx of      Melanoma No family hx of        Review of Systems - As per HPI and PMHx, otherwise review of system review of the head and neck negative. Otherwise 10+  review systems negative.    Physical Exam  /83   Pulse 89   Temp 97  F (36.1  C) (Tympanic)   Ht 1.829 m (6')   Wt 70.8 kg (156 lb)   SpO2 100%   BMI 21.16 kg/m    BMI: Body mass index is 21.16 kg/m .    General - The patient is well nourished and well developed, and appears to have good nutritional status.  Alert and oriented to person and place, answers questions and cooperates with examination appropriately.    SKIN - No suspicious lesions or rashes.  Respiration - No respiratory distress.  Head and Face - Normocephalic and atraumatic, with no gross asymmetry noted of the contour of the facial features.  The facial nerve is intact, with strong symmetric movements.    Voice and Breathing - The patient was breathing comfortably without the use of accessory muscles. The patients voice was clear and strong, and had appropriate pitch and quality.    Ears - Bilateral pinna and EACs with normal appearing overlying skin. Tympanic membrane intact with good mobility on pneumatic otoscopy bilaterally. Bony landmarks of the ossicular chain are normal. The tympanic membranes are normal in appearance. No retraction, perforation, or masses.  No fluid or purulence was seen in the external canal or the middle ear.     Eyes - Extraocular movements intact.  Sclera were not icteric or injected, conjunctiva were pink and moist.    Mouth - Examination of the oral cavity showed pink, healthy oral mucosa. No lesions or ulcerations noted.  The tongue was mobile and midline, and the dentition were in good condition.      Throat - The walls of the oropharynx were smooth, pink, moist, symmetric, and had no lesions or ulcerations.  The tonsillar pillars and soft palate were symmetric.  The uvula was midline on elevation.    Neck - Normal midline excursion of the laryngotracheal complex during swallowing.  Full range of motion on passive movement.  Palpation of the occipital, submental, submandibular, internal jugular chain, and  supraclavicular nodes did not demonstrate any abnormal lymph nodes or masses.  The carotid pulse was palpable bilaterally.  Palpation of the thyroid was soft and smooth, with no nodules or goiter appreciated.  The trachea was mobile and midline.    Nose - External contour is symmetric, no gross deflection or scars.  Nasal mucosa is pink and moist with no abnormal mucus.  Septal deviation to the left. Hypertrophy turbinates.     Neuro - Nonfocal neuro exam is normal, CN 2 through 12 intact, normal gait and muscle tone.      Performed in clinic today:  Attempts at mirror laryngoscopy were not possible due to gag reflex.  Therefore I proceeded with a fiberoptic examination.  First I sprayed both sides of the nose with a mixture of lidocaine and neosynephrine.  I then passed the scope through the nasal cavity.  The nasal cavity was unremarkable.  The nasopharynx was mucosally covered and symmetric.  The Eustachian tube openings were unobstructed.  Going further down I had a clear view of the base of tongue which had normal appearing lingual tonsillar tissue.  The base of tongue was free of lesions and cobblestone, and the vallecula was open.  The epiglottis was smooth and mucosally covered.  The supraglottic larynx was then clearly visualized.  The vocal cords moved smoothly and symmetrically, they were pearly white and no lesions were seen. Arytenoids appear edematous.  The pyriform sinuses were open, and the limited view of the postcricoid region did not show any lesions. AMBU                                  A/P -   It was my pleasure seeing Jefry Hatfield today in clinic. The patient presents with sore throat. There is no evidence of infection or neoplasm on exam to explain the symptoms. The most likely etiology is LPRD.      Start omeprazole 40 mg once daily 20-30 minutes prior to dinner. As long as symptoms are improving add in 2 TBS or 2 capsules of Apple Cider Vinegar. Then after week 8 if symptoms are  improving stop the Omeprazole. We provided counseling on lifestyle changes including avoidance of certain foods, sleeping on an incline, and avoiding lying down within 3-4 hours after eating.     If the patient is not improving, we will pursue a video swallow study with esophogram and/or EGD.    The patient will follow up in 6-8 weeks.     Will have him trial a saline rinse bottle for the next 2-3 months to see if that helps with any allergy symptoms. If symptoms persist, we could add in Flonase. We did discuss surgical intervention of a septoplasty. At this time, he will hold off on discussing this with a surgeon.     Prescribe     DILLAN Lopez CNP        Again, thank you for allowing me to participate in the care of your patient.        Sincerely,        DILLAN Lopez CNP

## 2024-09-12 NOTE — PROGRESS NOTES
ENT Consultation          History of Present Illness - Jefry Hatfield is a 73 year old male presents for evaluation of sore throat.    This has been going on for 6 months. He notes symptoms can be present when he eats. They describe the sore throat as fluid dripping in the back of his throat located underneath the ears. Voicing has seems more hoarse. Intermittent cough. No hemoptysis. The patient denies any recent intubations or throat procedures. They note no history of relux. No recent positive strep tests. They have tried gum and mouth wash. He was on a week of antibiotic. This hasn't helped much. No dysphagia. He is not a smoker.    He was tested for strep back on 07/09/24, which was negative.     He takes a medication for anxiety and he has been experiencing dry mouth. He has been using Xylol and gum.     CT NECK 8/16/24:   CT Soft Tissue Neck w Contrast  Narrative: CT SOFT TISSUE NECK W CONTRAST 8/16/2024 11:16 AM     History:  dry mouth, sore throat; Sore throat; Neck pain  ICD-10: Sore throat; Neck pain      Comparison:  None available.      Technique: Following intravenous administration of nonionic iodinated  contrast medium, thin section helical CT images were obtained from the  skull base down to the level of the aortic arch.  Axial, coronal and  sagittal reformations were performed with 2-3 mm slice thickness  reconstruction. Images were reviewed in soft tissue, lung and bone  windows.     Contrast: Isovue 370 90cc     Findings:   Exam is limited by metal artifact from dental implants. Enlarged uvula  which impedes the nasopharyngeal air column. The tongue base appears  normal. The parotid glands appear atrophic with homogeneous density.  The submandibular and thyroid glands appear normal.     There is no evident cervical lymphadenopathy. The fascial spaces in  the neck are intact bilaterally. The major vascular structures in the  neck appear unremarkable.     Evaluation of the osseous  structures demonstrate no worrisome lytic or  sclerotic lesion. Disc height loss at C5-6 and C6-7, most  significantly there is moderate left foraminal stenosis at C5-6.  Frothy heterogeneous opacification of left maxillary sinus. Bilateral  conchae bullosa. No periapical abscesses of the teeth. The mastoid air  cells are clear. Bilateral scleral brennan and pseudophakia.     The visualized lung apices are clear.     There is no hemorrhage or midline shift in the visualized portion of  the brain. Partially visualized megacisterna magna.  Impression: Impression: Acute left maxillary sinusitis.     I have personally reviewed the examination and initial interpretation  and I agree with the findings.     YEN HAAS MD        Past Medical History -   Past Medical History:   Diagnosis Date    Anisometropia     Cataract, right eye     Cervical radiculopathy     High myopia     Myopic degeneration     Posterior staphyloma     LE    Pseudophakia of left eye     Retinal detachment RE 1971,LE1980    BE       Current Medications -   Current Outpatient Medications:     amoxicillin-clavulanate (AUGMENTIN) 500-125 MG tablet, Take 1 tablet by mouth 2 times daily (Patient not taking: Reported on 8/28/2024), Disp: 14 tablet, Rfl: 0    CALCIUM-MAG-VIT C-VIT D PO, Take by mouth 2 times daily, Disp: , Rfl:     docusate sodium (COLACE) 100 MG capsule, Take 1 capsule (100 mg) by mouth 2 times daily, Disp: 60 capsule, Rfl: 2    ibuprofen (ADVIL/MOTRIN) 200 MG capsule, Take 200 mg by mouth (Patient not taking: Reported on 8/28/2024), Disp: , Rfl:     nortriptyline (PAMELOR) 25 MG capsule, Take 100 mg by mouth daily, Disp: , Rfl: 2    Omega-3 Fatty Acids (OMEGA-3 FISH OIL PO), Take by mouth daily, Disp: , Rfl:     polyethylene glycol (MIRALAX) 17 GM/Dose powder, Take 17 g (1 Capful) by mouth daily, Disp: 850 g, Rfl: 0    Saw Palmetto, Serenoa repens, (SAW PALMETTO EXTRACT PO), Take by mouth daily, Disp: , Rfl:     senna (SENOKOT) 8.6 MG  tablet, Take 1 tablet by mouth daily, Disp: 30 tablet, Rfl: 3    tacrolimus (PROTOPIC) 0.1 % external ointment, APPLY TOPICALLY 2 TIMES DAILY, Disp: 60 g, Rfl: 2    VITAMIN D, CHOLECALCIFEROL, PO, Take by mouth daily, Disp: , Rfl:     Allergies - No Known Allergies    Social History -   Social History     Socioeconomic History    Marital status:    Occupational History    Occupation:       Employer: [employer]   Tobacco Use    Smoking status: Never     Passive exposure: Never    Smokeless tobacco: Never   Vaping Use    Vaping status: Never Used   Substance and Sexual Activity    Alcohol use: Yes     Comment: occaisonal     Drug use: No    Sexual activity: Yes     Partners: Female   Social History Narrative    , works at [employer]     Social Determinants of Health     Interpersonal Safety: Low Risk  (7/9/2024)    Interpersonal Safety     Do you feel physically and emotionally safe where you currently live?: Yes     Within the past 12 months, have you been hit, slapped, kicked or otherwise physically hurt by someone?: No     Within the past 12 months, have you been humiliated or emotionally abused in other ways by your partner or ex-partner?: No       Family History -   Family History   Problem Relation Age of Onset    Dementia Mother     Heart Failure Father     Asthma Father     Diabetes Paternal Grandfather     Cancer No family hx of     Glaucoma No family hx of     Macular Degeneration No family hx of     Skin Cancer No family hx of     Melanoma No family hx of        Review of Systems - As per HPI and PMHx, otherwise review of system review of the head and neck negative. Otherwise 10+ review systems negative.    Physical Exam  /83   Pulse 89   Temp 97  F (36.1  C) (Tympanic)   Ht 1.829 m (6')   Wt 70.8 kg (156 lb)   SpO2 100%   BMI 21.16 kg/m    BMI: Body mass index is 21.16 kg/m .    General - The patient is well nourished and well developed, and  appears to have good nutritional status.  Alert and oriented to person and place, answers questions and cooperates with examination appropriately.    SKIN - No suspicious lesions or rashes.  Respiration - No respiratory distress.  Head and Face - Normocephalic and atraumatic, with no gross asymmetry noted of the contour of the facial features.  The facial nerve is intact, with strong symmetric movements.    Voice and Breathing - The patient was breathing comfortably without the use of accessory muscles. The patients voice was clear and strong, and had appropriate pitch and quality.    Ears - Bilateral pinna and EACs with normal appearing overlying skin. Tympanic membrane intact with good mobility on pneumatic otoscopy bilaterally. Bony landmarks of the ossicular chain are normal. The tympanic membranes are normal in appearance. No retraction, perforation, or masses.  No fluid or purulence was seen in the external canal or the middle ear.     Eyes - Extraocular movements intact.  Sclera were not icteric or injected, conjunctiva were pink and moist.    Mouth - Examination of the oral cavity showed pink, healthy oral mucosa. No lesions or ulcerations noted.  The tongue was mobile and midline, and the dentition were in good condition.      Throat - The walls of the oropharynx were smooth, pink, moist, symmetric, and had no lesions or ulcerations.  The tonsillar pillars and soft palate were symmetric.  The uvula was midline on elevation.    Neck - Normal midline excursion of the laryngotracheal complex during swallowing.  Full range of motion on passive movement.  Palpation of the occipital, submental, submandibular, internal jugular chain, and supraclavicular nodes did not demonstrate any abnormal lymph nodes or masses.  The carotid pulse was palpable bilaterally.  Palpation of the thyroid was soft and smooth, with no nodules or goiter appreciated.  The trachea was mobile and midline.    Nose - External contour is  symmetric, no gross deflection or scars.  Nasal mucosa is pink and moist with no abnormal mucus.  Septal deviation to the left. Hypertrophy turbinates.     Neuro - Nonfocal neuro exam is normal, CN 2 through 12 intact, normal gait and muscle tone.      Performed in clinic today:  Attempts at mirror laryngoscopy were not possible due to gag reflex.  Therefore I proceeded with a fiberoptic examination.  First I sprayed both sides of the nose with a mixture of lidocaine and neosynephrine.  I then passed the scope through the nasal cavity.  The nasal cavity was unremarkable.  The nasopharynx was mucosally covered and symmetric.  The Eustachian tube openings were unobstructed.  Going further down I had a clear view of the base of tongue which had normal appearing lingual tonsillar tissue.  The base of tongue was free of lesions and cobblestone, and the vallecula was open.  The epiglottis was smooth and mucosally covered.  The supraglottic larynx was then clearly visualized.  The vocal cords moved smoothly and symmetrically, they were pearly white and no lesions were seen. Arytenoids appear edematous.  The pyriform sinuses were open, and the limited view of the postcricoid region did not show any lesions. AMBU                                  A/P -   It was my pleasure seeing Jefry Hatfield today in clinic. The patient presents with sore throat. There is no evidence of infection or neoplasm on exam to explain the symptoms. The most likely etiology is LPRD.      Start omeprazole 40 mg once daily 20-30 minutes prior to dinner. As long as symptoms are improving add in 2 TBS or 2 capsules of Apple Cider Vinegar. Then after week 8 if symptoms are improving stop the Omeprazole. We provided counseling on lifestyle changes including avoidance of certain foods, sleeping on an incline, and avoiding lying down within 3-4 hours after eating.     If the patient is not improving, we will pursue a video swallow study with  esophogram and/or EGD.    The patient will follow up in 6-8 weeks.     Will have him trial a saline rinse bottle for the next 2-3 months to see if that helps with any allergy symptoms. If symptoms persist, we could add in Flonase. We did discuss surgical intervention of a septoplasty. At this time, he will hold off on discussing this with a surgeon.     Prescribe     DILLAN Lopez CNP

## 2024-09-12 NOTE — PATIENT INSTRUCTIONS
You were seen by Tabatha Tate CNP.  If you have questions or concerns regarding your appointment today, you can reach out to our call center at [phone].  The following has been recommended at your appointment today:    Laryngeal Reflux Medication Management:     1. the Omeprazole. Take for the next 8 weeks.  2.If symptoms are improve, then start taking apple cider vinegar during week 7. You may take 2 tbs or 2 capsules of apple cider vinegar with the Omeprazole.   3.After week 8, stop taking the Omeprazole.  4.Send me a [employer] Voice message with an update on how you are doing.    Adult lifestyle changes to prevent LPR reviewed     Avoid eating and drinking within two to three hours prior to bedtime    Do not drink alcohol    Eat small meals and slowly    Limit problem foods:       Caffeine    Carbonated drinks     Chocolate    Peppermint    Tomato    Citrus fruits    Fatty and fried foods       Lose weight    Quit smoking     Wear loose clothing    Start the saline rinse one to two times daily.     NASAL SALINE IRRIGATION INSTRUCTIONS    You will be starting nasal saline irrigations and will need to obtain the following:      - NeilMed Sinus Rinse 8 oz Kit  - Distilled or filtered water   - Normal saline salt packets    Place filtered or distilled water into the NeilMed bottle up to the fill line (DO NOT USE TAP OR WELL WATER).  Place the pre-made salt packet in the 8 oz of saline.  Shake the bottle to suspend into solution.  Lean head forward over a sink or a basin.  Rinse each side of the nose with one-half of the bottle (each squeeze is about one-half of the bottle). Rinse the nose daily.     If you use topical nasal sprays, apply following irrigation.    Video example: https://www.Experticity.com/watch?v=[email or web address]

## 2024-09-13 ENCOUNTER — MYC MEDICAL ADVICE (OUTPATIENT)
Dept: OTOLARYNGOLOGY | Facility: CLINIC | Age: 74
End: 2024-09-13
Payer: COMMERCIAL

## 2024-09-13 NOTE — TELEPHONE ENCOUNTER
"Patient having \"fluttering\" sensation in his left leg. (Per his wife) and you can see the patients Flimmer message as well.     He does take an oral magnesium supplement, eats a banana's, and has been taking coconut water as well for electrolyte replacement. Wife reports he was diagnosed with hypomagnesemia a few years ago and has been taking the mag for some time.     He's taking OTC mag citrate 450 mg, Magneuro blend malate, L-threonate, glycinate.     Only taken one omeprazole. No other side effects such as weakness, irritability or confusion noted by wife.     Wife states that apple cider vinager was talked about during last visit and will do that tonight instead of the omeprazole and see if the symptoms subside.           "

## 2024-09-26 ENCOUNTER — THERAPY VISIT (OUTPATIENT)
Dept: OCCUPATIONAL THERAPY | Facility: CLINIC | Age: 74
End: 2024-09-26
Attending: OPHTHALMOLOGY
Payer: COMMERCIAL

## 2024-09-26 DIAGNOSIS — H15.833 POSTERIOR STAPHYLOMA, BILATERAL: Primary | ICD-10-CM

## 2024-09-26 PROCEDURE — 97535 SELF CARE MNGMENT TRAINING: CPT | Mod: GO | Performed by: OCCUPATIONAL THERAPIST

## 2024-09-27 NOTE — PROGRESS NOTES
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)  - Primary   OT Tx Diagnosis decreased ADL/IADL independence   Precautions/Limitations falls precaution due to low vision   Progress Note/Certification   Start Of Care Date 12/18/23   Onset of Illness/Injury or Date of Surgery 09/27/23   Therapy Frequency 1x/week, decreasing frequency as indicated   Predicted Duration 6 more months (extended due to scheduling conflicts)   Certification date from 09/12/24   Certification date to 12/11/24   Progress Note Due Date 12/11/24   Progress Note Completed Date 07/18/24   Goals   OT Goals 1;2;3;4   OT Goal 1   Goal Identifier Near Vision   Goal Description Patient will demonstrate 3 pieces of adaptive equipment and/or adaptive techniques in regards to magnification, lighting, contrast and glare for increased ADL/IADL independence with near vision tasks (reading, meal prep, medication management, writing).   Rationale In order to maximize safety and independence with performance of self-care activities;In order to safely and appropriately apply compensatory strategies with ADL/IADL performance   Target Date 12/11/24   OT Goal 2   Goal Identifier Environmental modifications   Goal Description Patient will demonstrate and/or verbalize 2 environmentally-based ADL modifications to improve visual-based ADL/IADL activities.   Rationale In order to maximize safety and independence with performance of self-care activities;In order to safely and appropriately apply compensatory strategies with ADL/IADL performance   Target Date 12/11/24   OT Goal 3   Goal Identifier Resource Education   Goal Description Patient will verbalize awareness of 2 community resources for increased ADL/IADL completion.   Rationale In order to maximize safety and independence with performance of self-care activities   Target Date  12/11/24   OT Goal 4   Goal Identifier Distance Viewing   Goal Description Patient will demonstrate increased independence in distance viewing for safety and leisure during ADL/IADLs through independent use of at least one adaptive technique or AE.   Rationale In order to maximize safety and independence with performance of self-care activities;In order to safely and appropriately apply compensatory strategies with ADL/IADL performance   Target Date 12/11/24   Subjective Report   Subjective Report Patient got the Bandon vision device from [employer] and has used it about 3 weeks.  Used at the [employer] game last night and it did allow greater visibility, still slightly grainy. Has some more questions about it.  Also having trouble with Grace and calling.  Got the dark jonatan filters and they are working well.   Objective Measures   Objective Measures Objective Measure 1   Treatment Interventions (OT)   Interventions Self Care/Home Management   Self Care/Home Management   Self-Care/Home Mgmt/ADL, Compensatory, Meal Prep Minutes (90638) 56 Minutes   Self Care 1 Adaptive equipment and adapted strategies to maximize visual based ADLs/IADLs   Skilled Intervention Please see education section below for details of all areas of education completed today including education in AE and adapted techniques to increase visibility for ADL/IADLs.  The most successful techniques and AE are listed below.  Patient demonstrated and/or verbalized use of all of the adapated techniques and AE with min cues after increased time.  Custom typed out instructions and resources were provided in larger print regarding education completed today but not as large as previously - going to use Seeing  technology.   Patient Response/Progress progress in goals 1,2,3,4   Education   Learner/Method Patient;Listening;Reading;Demonstration   Education Comments Continued education in and trial of Bandon headworn device - used for distance today; continued education  in proper use and how to focus and enlarge, make smaller, also could try near glasses to see if more clear (didn't have with today), Unfortunately will be less clear if looking in the far distance,  zoom out until know what looking at/for, etc.; continued education in technology features to assist: Grace and Voiceover:   Call Apple Support to make sure Grace isn t just connected to your contacts when using Grace to call   Practice using Voiceover on your phone and apple watch and then use Grace after that - will provide you more information when you use Voiceover (demonstrated today for various tasks after education)  Remember to use Grace to  Turn on/off  voiceover   Remember you may have to push the home button after you turn on voiceover to make the banner go away   To use voiceover, tap once to know what is underneath your finger and then tap twice to engage/open the item, etc.; strongly encouarged more pratice with Seeing  and use voiceover more   Plan   Home program NEW: more Seeing  and voiceover; CONT: cont. Garrard through [employer], consider trying eSight thru co. if would like (though likely  not as good as Garrard); consider [employer] gigi for navigation through apple maps when walking; get extra dark jonatan wraparounds prn for bright sun and bucket hat for his med. tint dark FL41 glasses;  turn off passcodes/fingerprint on all; Seeing  (short text, document and scene only so far), get pop socket for ph, [employer] on ipad - use spoken content and/or voiceover, dark mode only for contrast option; work with Emilia - commands, etc.; CML; call [employer], etc. for audio, do Spotify, etc. on Emilia (connect), SSB - white cane (Rebecca hasn't gotten back to them; more voiceover; change ph. wallpaper to black, set sounds to differ on watch for text vs. vm, etc.; auto answer work on watch after # of sec.?; consider stylus to change zoom levels (hopefully don't need to do); cont. to use ipad stand and get case!; answer/decline  "call with smart watch and use of bump dot; Voiceover (\"start voiceover\" on cell and hit home button to make the voiceover bar go away); Zoom (quick and double tap on controller primarily), more Grace, consider clamp on or stand stahl for ipad; more options for Spoken Content, consider +10.0 prismatic's; PRL inferior, try Grace to turn off timers/alarms   Plan for next session continue per POC   Total Session Time   Timed Code Treatment Minutes 56   Total Treatment Time (sum of timed and untimed services) 56         [facility]                                                                                   OUTPATIENT OCCUPATIONAL THERAPY    PLAN OF TREATMENT FOR OUTPATIENT REHABILITATION   Patient's Last Name, First Name, LITOBRIAN  SalalisonomarJefry  David YOB: 1950   Provider's Name   [facility]   Medical Record No.  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  Full progress note to be written at 10th visit or 12/11/24, whichever occurs 1st.    Sienna Plascencia, OT                         I CERTIFY THE NEED FOR THESE SERVICES FURNISHED UNDER        THIS PLAN OF TREATMENT AND WHILE UNDER MY CARE     (Physician attestation of this document indicates review and certification of the therapy plan).              Referring Provider:  Lisa Chang    Initial Assessment  See Epic Evaluation- 12/18/23         "

## 2024-09-29 ENCOUNTER — APPOINTMENT (OUTPATIENT)
Dept: CT IMAGING | Facility: CLINIC | Age: 74
End: 2024-09-29
Attending: FAMILY MEDICINE
Payer: COMMERCIAL

## 2024-09-29 ENCOUNTER — APPOINTMENT (OUTPATIENT)
Dept: ULTRASOUND IMAGING | Facility: CLINIC | Age: 74
End: 2024-09-29
Attending: FAMILY MEDICINE
Payer: COMMERCIAL

## 2024-09-29 ENCOUNTER — HOSPITAL ENCOUNTER (EMERGENCY)
Facility: CLINIC | Age: 74
Discharge: HOME | End: 2024-09-29
Attending: FAMILY MEDICINE | Admitting: FAMILY MEDICINE
Payer: COMMERCIAL

## 2024-09-29 VITALS
TEMPERATURE: 97.4 F | OXYGEN SATURATION: 98 % | DIASTOLIC BLOOD PRESSURE: 84 MMHG | RESPIRATION RATE: 18 BRPM | SYSTOLIC BLOOD PRESSURE: 128 MMHG | WEIGHT: 158 LBS | BODY MASS INDEX: 21.43 KG/M2 | HEART RATE: 73 BPM

## 2024-09-29 DIAGNOSIS — N45.1 LEFT EPIDIDYMITIS: ICD-10-CM

## 2024-09-29 LAB
ALBUMIN SERPL BCG-MCNC: 4.4 G/DL (ref 3.5–5.2)
ALBUMIN UR-MCNC: NEGATIVE MG/DL
ALP SERPL-CCNC: 154 U/L (ref 40–150)
ALT SERPL W P-5'-P-CCNC: 21 U/L (ref 0–70)
ANION GAP SERPL CALCULATED.3IONS-SCNC: 4 MMOL/L (ref 7–15)
APPEARANCE UR: CLEAR
APTT PPP: 26 SECONDS (ref 22–38)
AST SERPL W P-5'-P-CCNC: 25 U/L (ref 0–45)
BACTERIA #/AREA URNS HPF: ABNORMAL /HPF
BASOPHILS # BLD AUTO: 0 10E3/UL (ref 0–0.2)
BASOPHILS NFR BLD AUTO: 1 %
BILIRUB SERPL-MCNC: 0.5 MG/DL
BILIRUB UR QL STRIP: NEGATIVE
BUN SERPL-MCNC: 19.9 MG/DL (ref 8–23)
CALCIUM SERPL-MCNC: 9.7 MG/DL (ref 8.8–10.4)
CHLORIDE SERPL-SCNC: 99 MMOL/L (ref 98–107)
COLOR UR AUTO: YELLOW
CREAT SERPL-MCNC: 1.22 MG/DL (ref 0.67–1.17)
EGFRCR SERPLBLD CKD-EPI 2021: 63 ML/MIN/1.73M2
EOSINOPHIL # BLD AUTO: 0.1 10E3/UL (ref 0–0.7)
EOSINOPHIL NFR BLD AUTO: 1 %
ERYTHROCYTE [DISTWIDTH] IN BLOOD BY AUTOMATED COUNT: 11.9 % (ref 10–15)
GLUCOSE SERPL-MCNC: 109 MG/DL (ref 70–99)
GLUCOSE UR STRIP-MCNC: NEGATIVE MG/DL
HCO3 SERPL-SCNC: 32 MMOL/L (ref 22–29)
HCT VFR BLD AUTO: 44.7 % (ref 40–53)
HGB BLD-MCNC: 15.4 G/DL (ref 13.3–17.7)
HGB UR QL STRIP: NEGATIVE
IMM GRANULOCYTES # BLD: 0 10E3/UL
IMM GRANULOCYTES NFR BLD: 0 %
INR PPP: 1 (ref 0.85–1.15)
KETONES UR STRIP-MCNC: NEGATIVE MG/DL
LEUKOCYTE ESTERASE UR QL STRIP: NEGATIVE
LYMPHOCYTES # BLD AUTO: 1.5 10E3/UL (ref 0.8–5.3)
LYMPHOCYTES NFR BLD AUTO: 27 %
MCH RBC QN AUTO: 31.6 PG (ref 26.5–33)
MCHC RBC AUTO-ENTMCNC: 34.5 G/DL (ref 31.5–36.5)
MCV RBC AUTO: 92 FL (ref 78–100)
MONOCYTES # BLD AUTO: 0.5 10E3/UL (ref 0–1.3)
MONOCYTES NFR BLD AUTO: 9 %
NEUTROPHILS # BLD AUTO: 3.6 10E3/UL (ref 1.6–8.3)
NEUTROPHILS NFR BLD AUTO: 62 %
NITRATE UR QL: NEGATIVE
NRBC # BLD AUTO: 0 10E3/UL
NRBC BLD AUTO-RTO: 0 /100
PH UR STRIP: 7 [PH] (ref 5–7)
PLATELET # BLD AUTO: 211 10E3/UL (ref 150–450)
POTASSIUM SERPL-SCNC: 4.5 MMOL/L (ref 3.4–5.3)
PROT SERPL-MCNC: 7.1 G/DL (ref 6.4–8.3)
RBC # BLD AUTO: 4.87 10E6/UL (ref 4.4–5.9)
RBC URINE: 0 /HPF
SODIUM SERPL-SCNC: 135 MMOL/L (ref 135–145)
SP GR UR STRIP: 1.01 (ref 1–1.03)
UROBILINOGEN UR STRIP-MCNC: NORMAL MG/DL
WBC # BLD AUTO: 5.7 10E3/UL (ref 4–11)
WBC URINE: <1 /HPF

## 2024-09-29 PROCEDURE — 96365 THER/PROPH/DIAG IV INF INIT: CPT | Performed by: FAMILY MEDICINE

## 2024-09-29 PROCEDURE — 80053 COMPREHEN METABOLIC PANEL: CPT | Performed by: FAMILY MEDICINE

## 2024-09-29 PROCEDURE — 250N000011 HC RX IP 250 OP 636: Performed by: FAMILY MEDICINE

## 2024-09-29 PROCEDURE — 99285 EMERGENCY DEPT VISIT HI MDM: CPT | Mod: 25 | Performed by: FAMILY MEDICINE

## 2024-09-29 PROCEDURE — 36415 COLL VENOUS BLD VENIPUNCTURE: CPT | Performed by: FAMILY MEDICINE

## 2024-09-29 PROCEDURE — 74176 CT ABD & PELVIS W/O CONTRAST: CPT

## 2024-09-29 PROCEDURE — 81001 URINALYSIS AUTO W/SCOPE: CPT | Performed by: FAMILY MEDICINE

## 2024-09-29 PROCEDURE — 250N000013 HC RX MED GY IP 250 OP 250 PS 637: Performed by: FAMILY MEDICINE

## 2024-09-29 PROCEDURE — 93976 VASCULAR STUDY: CPT

## 2024-09-29 PROCEDURE — 258N000003 HC RX IP 258 OP 636: Performed by: FAMILY MEDICINE

## 2024-09-29 PROCEDURE — 96361 HYDRATE IV INFUSION ADD-ON: CPT | Performed by: FAMILY MEDICINE

## 2024-09-29 PROCEDURE — 99284 EMERGENCY DEPT VISIT MOD MDM: CPT | Performed by: FAMILY MEDICINE

## 2024-09-29 PROCEDURE — 85730 THROMBOPLASTIN TIME PARTIAL: CPT | Performed by: FAMILY MEDICINE

## 2024-09-29 PROCEDURE — 85004 AUTOMATED DIFF WBC COUNT: CPT | Performed by: FAMILY MEDICINE

## 2024-09-29 PROCEDURE — 85610 PROTHROMBIN TIME: CPT | Performed by: FAMILY MEDICINE

## 2024-09-29 RX ORDER — LIDOCAINE 40 MG/G
CREAM TOPICAL
Status: DISCONTINUED | OUTPATIENT
Start: 2024-09-29 | End: 2024-09-29 | Stop reason: HOSPADM

## 2024-09-29 RX ORDER — CEFTRIAXONE 1 G/1
1 INJECTION, POWDER, FOR SOLUTION INTRAMUSCULAR; INTRAVENOUS ONCE
Status: COMPLETED | OUTPATIENT
Start: 2024-09-29 | End: 2024-09-29

## 2024-09-29 RX ORDER — CIPROFLOXACIN 500 MG/1
500 TABLET, FILM COATED ORAL 2 TIMES DAILY
Qty: 14 TABLET | Refills: 0 | Status: SHIPPED | OUTPATIENT
Start: 2024-09-29 | End: 2024-10-06

## 2024-09-29 RX ORDER — ACETAMINOPHEN 500 MG
1000 TABLET ORAL ONCE
Status: COMPLETED | OUTPATIENT
Start: 2024-09-29 | End: 2024-09-29

## 2024-09-29 RX ADMIN — ACETAMINOPHEN 1000 MG: 500 TABLET ORAL at 11:25

## 2024-09-29 RX ADMIN — CEFTRIAXONE SODIUM 1 G: 1 INJECTION, POWDER, FOR SOLUTION INTRAMUSCULAR; INTRAVENOUS at 11:33

## 2024-09-29 RX ADMIN — SODIUM CHLORIDE 1000 ML: 9 INJECTION, SOLUTION INTRAVENOUS at 10:53

## 2024-09-29 ASSESSMENT — ACTIVITIES OF DAILY LIVING (ADL)
ADLS_ACUITY_SCORE: 35

## 2024-09-29 ASSESSMENT — COLUMBIA-SUICIDE SEVERITY RATING SCALE - C-SSRS
6. HAVE YOU EVER DONE ANYTHING, STARTED TO DO ANYTHING, OR PREPARED TO DO ANYTHING TO END YOUR LIFE?: NO
1. IN THE PAST MONTH, HAVE YOU WISHED YOU WERE DEAD OR WISHED YOU COULD GO TO SLEEP AND NOT WAKE UP?: NO
2. HAVE YOU ACTUALLY HAD ANY THOUGHTS OF KILLING YOURSELF IN THE PAST MONTH?: NO

## 2024-09-29 NOTE — DISCHARGE INSTRUCTIONS
Home.  Take the cipro for epididymitis that appears to be the cause of your pain.  Other tests look good.  Tylenol 1 gram three times a day as needed.  See Dr. See in a week for a recheck.  REturn if any concerns.    Results for orders placed or performed during the hospital encounter of 09/29/24   US Testicular & Scrotum w Doppler Ltd     Status: None    Narrative    EXAM: US TESTICULAR AND SCROTUM WITH DOPPLER LIMITED  LOCATION: [facility]  DATE: 9/29/2024    INDICATION: left testicle pain  COMPARISON: None.  TECHNIQUE: Ultrasound of scrotum with color flow and spectral Doppler with waveform analysis performed.    FINDINGS:    RIGHT: Right testicle measures 3.4 x 5.2 x 2.3 cm. Normal testicle with no masses. Normal arterial duplex and normal color flow. Punctate echogenic foci in the epididymal tail, doubtful significance. No hydrocele. No varicocele.    LEFT: Left testicle measures 5.0 x 2.9 x 2.4 cm. Normal testicle with no masses. Normal arterial duplex and normal color flow. Localized hypervascularity in the epididymis. No hydrocele. No varicocele.      Impression    IMPRESSION:  Localized hypervascularity in the left epididymis consistent with epididymitis.     Abd/pelvis CT no contrast - Stone Protocol     Status: None    Narrative    EXAM: CT ABDOMEN AND PELVIS WITHOUT CONTRAST  LOCATION: [facility]  DATE: 09/29/2024    INDICATION: Left testicular pain.  COMPARISON: Scrotal ultrasound performed earlier today. CT of the abdomen and pelvis 05/29/2024.  TECHNIQUE: CT scan of the abdomen and pelvis was performed without IV contrast. Multiplanar reformats were obtained. Dose reduction techniques were used.  CONTRAST: None.    FINDINGS:   LOWER CHEST: Unremarkable.    HEPATOBILIARY: A few small hypodensities in the liver are too small to characterize, but may represent cysts. No calcified gallstones.    PANCREAS:  Normal.    SPLEEN: Normal.    ADRENAL GLANDS: Normal.    KIDNEYS/BLADDER: No significant mass, stone, or hydronephrosis.    BOWEL: No obstruction or inflammatory change. The appendix is not visualized, but there are no convincing secondary signs of appendicitis.    LYMPH NODES: Normal.    VASCULATURE: Normal.    PELVIC ORGANS: Mild prostatic enlargement.    MUSCULOSKELETAL: Heterogeneous lytic and sclerotic changes throughout the sacrum. The sacrum is unchanged, possibly Paget's disease.      Impression    IMPRESSION:   1.  No acute abnormality in the abdomen or pelvis. No cause for left testicular pain is identified.  2.  Mild prostatic enlargement.     Partial thromboplastin time     Status: Normal   Result Value Ref Range    aPTT 26 22 - 38 Seconds   INR     Status: Normal   Result Value Ref Range    INR 1.00 0.85 - 1.15   Comprehensive metabolic panel     Status: Abnormal   Result Value Ref Range    Sodium 135 135 - 145 mmol/L    Potassium 4.5 3.4 - 5.3 mmol/L    Carbon Dioxide (CO2) 32 (H) 22 - 29 mmol/L    Anion Gap 4 (L) 7 - 15 mmol/L    Urea Nitrogen 19.9 8.0 - 23.0 mg/dL    Creatinine 1.22 (H) 0.67 - 1.17 mg/dL    GFR Estimate 63 >60 mL/min/1.73m2    Calcium 9.7 8.8 - 10.4 mg/dL    Chloride 99 98 - 107 mmol/L    Glucose 109 (H) 70 - 99 mg/dL    Alkaline Phosphatase 154 (H) 40 - 150 U/L    AST 25 0 - 45 U/L    ALT 21 0 - 70 U/L    Protein Total 7.1 6.4 - 8.3 g/dL    Albumin 4.4 3.5 - 5.2 g/dL    Bilirubin Total 0.5 <=1.2 mg/dL   UA with Microscopic reflex to Culture     Status: Abnormal    Specimen: Urine, Midstream   Result Value Ref Range    Color Urine Yellow Colorless, Straw, Light Yellow, Yellow    Appearance Urine Clear Clear    Glucose Urine Negative Negative mg/dL    Bilirubin Urine Negative Negative    Ketones Urine Negative Negative mg/dL    Specific Gravity Urine 1.006 1.003 - 1.035    Blood Urine Negative Negative    pH Urine 7.0 5.0 - 7.0    Protein Albumin Urine Negative Negative mg/dL     Urobilinogen Urine Normal Normal, 2.0 mg/dL    Nitrite Urine Negative Negative    Leukocyte Esterase Urine Negative Negative    Bacteria Urine Few (A) None Seen /HPF    RBC Urine 0 <=2 /HPF    WBC Urine <1 <=5 /HPF    Narrative    Urine Culture not indicated   CBC with platelets and differential     Status: None   Result Value Ref Range    WBC Count 5.7 4.0 - 11.0 10e3/uL    RBC Count 4.87 4.40 - 5.90 10e6/uL    Hemoglobin 15.4 13.3 - 17.7 g/dL    Hematocrit 44.7 40.0 - 53.0 %    MCV 92 78 - 100 fL    MCH 31.6 26.5 - 33.0 pg    MCHC 34.5 31.5 - 36.5 g/dL    RDW 11.9 10.0 - 15.0 %    Platelet Count 211 150 - 450 10e3/uL    % Neutrophils 62 %    % Lymphocytes 27 %    % Monocytes 9 %    % Eosinophils 1 %    % Basophils 1 %    % Immature Granulocytes 0 %    NRBCs per 100 WBC 0 <1 /100    Absolute Neutrophils 3.6 1.6 - 8.3 10e3/uL    Absolute Lymphocytes 1.5 0.8 - 5.3 10e3/uL    Absolute Monocytes 0.5 0.0 - 1.3 10e3/uL    Absolute Eosinophils 0.1 0.0 - 0.7 10e3/uL    Absolute Basophils 0.0 0.0 - 0.2 10e3/uL    Absolute Immature Granulocytes 0.0 <=0.4 10e3/uL    Absolute NRBCs 0.0 10e3/uL   CBC with platelets differential     Status: None    Narrative    The following orders were created for panel order CBC with platelets differential.  Procedure                               Abnormality         Status                     ---------                               -----------         ------                     CBC with platelets and d...[[id number]]                      Final result                 Please view results for these tests on the individual orders.

## 2024-09-29 NOTE — ED TRIAGE NOTES
Triage Assessment (Adult)       Row Name 09/29/24 0901          Triage Assessment    Airway WDL WDL        Respiratory WDL    Respiratory WDL WDL        Skin Circulation/Temperature WDL    Skin Circulation/Temperature WDL WDL        Cardiac WDL    Cardiac WDL WDL        Peripheral/Neurovascular WDL    Peripheral Neurovascular WDL WDL        Cognitive/Neuro/Behavioral WDL    Cognitive/Neuro/Behavioral WDL WDL

## 2024-09-29 NOTE — ED TRIAGE NOTES
Pt reports Lt testicular pain starting last evening with some swelling and tenderness.      Triage Assessment (Adult)       Row Name 09/29/24 0901          Triage Assessment    Airway WDL WDL        Respiratory WDL    Respiratory WDL WDL        Skin Circulation/Temperature WDL    Skin Circulation/Temperature WDL WDL        Cardiac WDL    Cardiac WDL WDL        Peripheral/Neurovascular WDL    Peripheral Neurovascular WDL WDL        Cognitive/Neuro/Behavioral WDL    Cognitive/Neuro/Behavioral WDL WDL

## 2024-09-29 NOTE — ED PROVIDER NOTES
[facility] EMERGENCY DEPARTMENT ([facility])    9/29/24      ED PROVIDER NOTE   History     Chief Complaint   Patient presents with    Groin Pain     Pt reports pain in groin area, mainly Lt Testicle starting last evening. Pt denies any strenous activity precipitating pain, denies swelling but reports tenderness.     HPI  Jefry Hatfield is a 73 year old male with a past medical history of bilateral posterior staphyloma, senile nuclear sclerosis, and cervical radiculopathy who presents to the emergency department for groin pain.  Patient denies previous history of any torsion infection trauma hernias heavy lifting etc.  Patient noted last evening noted left testicle pain with some swelling and tenderness.  Denies dysuric symptoms no blood in the urine no problems with defecation.  Patient denies any numbness in the legs no abdominal pain or back pain or flank pain.  No fevers or chills no recent heavy lifting etc. and now presents for evaluation.    Past Medical History  Past Medical History:   Diagnosis Date    Anisometropia     Cataract, right eye     Cervical radiculopathy     High myopia     Myopic degeneration     Posterior staphyloma     LE    Pseudophakia of left eye     Retinal detachment RE 1971,[id number]    BE     Past Surgical History:   Procedure Laterality Date    APPENDECTOMY      CATARACT IOL, RT/LT Bilateral 11/14/12LE 7/1/14RE    BE    COLONOSCOPY N/A 5/25/2018    Procedure: COLONOSCOPY;  Screening Colonoscopy;  Surgeon: Zak Kaplan MD;  Location: [facility] OR    COLONOSCOPY N/A 7/3/2023    Procedure: COLONOSCOPY, WITH POLYPECTOMY AND BIOPSY;  Surgeon: Nancy Silvestre MD;  Location: [facility] OR    PHACOEMULSIFICATION CLEAR CORNEA WITH STANDARD INTRAOCULAR LENS IMPLANT  7/1/2014    Procedure: PHACOEMULSIFICATION CLEAR CORNEA WITH STANDARD INTRAOCULAR LENS IMPLANT;  Surgeon: Milan Bernardo MD;  Location:  [facility]    SCLERAL BUCKLE  1971    RE    SCLERAL BUCKLE  1980    LE    YAG  CAPSULOTOMY OD (RIGHT EYE)  10/30/2014     ciprofloxacin (CIPRO) 500 MG tablet  amoxicillin-clavulanate (AUGMENTIN) 500-125 MG tablet  CALCIUM-MAG-VIT C-VIT D PO  docusate sodium (COLACE) 100 MG capsule  ibuprofen (ADVIL/MOTRIN) 200 MG capsule  magic mouthwash (ENTER INGREDIENTS IN COMMENTS) suspension  magic mouthwash (ENTER INGREDIENTS IN COMMENTS) suspension  nortriptyline (PAMELOR) 25 MG capsule  Omega-3 Fatty Acids (OMEGA-3 FISH OIL PO)  omeprazole (PRILOSEC) 40 MG DR capsule  polyethylene glycol (MIRALAX) 17 GM/Dose powder  Saw Palmetto, Serenoa repens, (SAW PALMETTO EXTRACT PO)  senna (SENOKOT) 8.6 MG tablet  tacrolimus (PROTOPIC) 0.1 % external ointment  VITAMIN D, CHOLECALCIFEROL, PO      No Known Allergies  Family History  Family History   Problem Relation Age of Onset    Dementia Mother     Heart Failure Father     Asthma Father     Diabetes Paternal Grandfather     Cancer No family hx of     Glaucoma No family hx of     Macular Degeneration No family hx of     Skin Cancer No family hx of     Melanoma No family hx of      Social History   Social History     Tobacco Use    Smoking status: Never     Passive exposure: Never    Smokeless tobacco: Never   Vaping Use    Vaping status: Never Used   Substance Use Topics    Alcohol use: Yes     Comment: occaisonal     Drug use: No      A medically appropriate review of systems was performed with pertinent positives and negatives noted in the HPI, and all other systems negative.    Physical Exam   BP: 128/84  Pulse: 73  Temp: 97.4  F (36.3  C)  Resp: 18  Weight: 71.7 kg (158 lb)  SpO2: 98 %  Physical Exam  Vitals and nursing note reviewed.   Constitutional:       General: He is in acute distress.      Appearance: He is well-developed. He is not toxic-appearing or diaphoretic.      Comments: Patient not writhing in pain is comfortable alert and orient x 3.   HENT:      Head: Normocephalic and atraumatic.      Nose: Nose normal. No congestion.      Mouth/Throat:       Mouth: Mucous membranes are moist.      Pharynx: Oropharynx is clear.   Eyes:      General: No scleral icterus.     Extraocular Movements: Extraocular movements intact.      Conjunctiva/sclera: Conjunctivae normal.      Pupils: Pupils are equal, round, and reactive to light.   Cardiovascular:      Rate and Rhythm: Normal rate and regular rhythm.   Pulmonary:      Effort: Pulmonary effort is normal. No respiratory distress.      Breath sounds: No stridor.   Abdominal:      General: Abdomen is flat. There is no distension.      Palpations: There is no mass.      Tenderness: There is no abdominal tenderness. There is no right CVA tenderness, left CVA tenderness, guarding or rebound.      Hernia: No hernia is present.   Genitourinary:     Comments: Left testicle with mild tenderness but no gross swelling or malrotation no inguinal fullness hernia etc.  Femoral arteries palpable bilateral  Musculoskeletal:         General: No tenderness or deformity. Normal range of motion.      Cervical back: Normal range of motion and neck supple.   Lymphadenopathy:      Cervical: No cervical adenopathy.   Skin:     General: Skin is warm and dry.      Capillary Refill: Capillary refill takes less than 2 seconds. Left leg distal CMS intact     Findings: No erythema or rash.   Neurological:      General: No focal deficit present.      Mental Status: He is alert and oriented to person, place, and time.      Cranial Nerves: No cranial nerve deficit.      Sensory: No sensory deficit.      Coordination: Coordination normal.   Psychiatric:      Comments: Appropriate in the ER           ED Course, Procedures, & Data      Records are in epic.  Patient history of left sacroiliac joint pain in the back.  Patient had issues also.      In the ER will establish IV IV fluids were given checking urinalysis get a Doppler ultrasound of the testicle to rule out any torsion or see if there is increasing flow or other abnormality structurally.  Will also  order CT scan noncontrast get a urinalysis will be looking for things such as stones versus may be inguinal hernia defects other vascular or musculoskeletal changes that could be causing some referred pain also.  Patient agrees with plan stable here in the ER.    Patient's lab reviews show a white count of 5.7 hemoglobin 15.4.  Urinalysis pending at this point.  Patient's testicular ultrasound did show findings of increasing flow in the left concerning for acute epididymitis no other masses or other abnormalities noted.  CT scan stone protocol also did not reveal any stone or any hernias or other concerns of vascular changes etc.    Has known labs were reviewed as noted.  Urinalysis negative.  Sodium 135 potassium 4.5.  Bicarb is 32 gap is 4 creatinine is 1.22 patient did receive IV fluids here in the ER glucose 109.    In the ER patient given a gram of ceftriaxone IV    Discussed with urology also treatment this point more likely covering for coliforms I talked to urology will treat with Cipro for the next week and follow-up with his primary doctor Dr. Hewitt and will be reevaluated that point.  Patient continue Tylenol.  He should return if any concerns at all otherwise we will follow-up as noted with primary MD.      Procedures                 Results for orders placed or performed during the hospital encounter of 09/29/24   US Testicular & Scrotum w Doppler Ltd     Status: None    Narrative    EXAM: US TESTICULAR AND SCROTUM WITH DOPPLER LIMITED  LOCATION: [facility]  DATE: 9/29/2024    INDICATION: left testicle pain  COMPARISON: None.  TECHNIQUE: Ultrasound of scrotum with color flow and spectral Doppler with waveform analysis performed.    FINDINGS:    RIGHT: Right testicle measures 3.4 x 5.2 x 2.3 cm. Normal testicle with no masses. Normal arterial duplex and normal color flow. Punctate echogenic foci in the epididymal tail, doubtful significance. No hydrocele.  No varicocele.    LEFT: Left testicle measures 5.0 x 2.9 x 2.4 cm. Normal testicle with no masses. Normal arterial duplex and normal color flow. Localized hypervascularity in the epididymis. No hydrocele. No varicocele.      Impression    IMPRESSION:  Localized hypervascularity in the left epididymis consistent with epididymitis.     Abd/pelvis CT no contrast - Stone Protocol     Status: None    Narrative    EXAM: CT ABDOMEN AND PELVIS WITHOUT CONTRAST  LOCATION: [facility]  DATE: 09/29/2024    INDICATION: Left testicular pain.  COMPARISON: Scrotal ultrasound performed earlier today. CT of the abdomen and pelvis 05/29/2024.  TECHNIQUE: CT scan of the abdomen and pelvis was performed without IV contrast. Multiplanar reformats were obtained. Dose reduction techniques were used.  CONTRAST: None.    FINDINGS:   LOWER CHEST: Unremarkable.    HEPATOBILIARY: A few small hypodensities in the liver are too small to characterize, but may represent cysts. No calcified gallstones.    PANCREAS: Normal.    SPLEEN: Normal.    ADRENAL GLANDS: Normal.    KIDNEYS/BLADDER: No significant mass, stone, or hydronephrosis.    BOWEL: No obstruction or inflammatory change. The appendix is not visualized, but there are no convincing secondary signs of appendicitis.    LYMPH NODES: Normal.    VASCULATURE: Normal.    PELVIC ORGANS: Mild prostatic enlargement.    MUSCULOSKELETAL: Heterogeneous lytic and sclerotic changes throughout the sacrum. The sacrum is unchanged, possibly Paget's disease.      Impression    IMPRESSION:   1.  No acute abnormality in the abdomen or pelvis. No cause for left testicular pain is identified.  2.  Mild prostatic enlargement.     Partial thromboplastin time     Status: Normal   Result Value Ref Range    aPTT 26 22 - 38 Seconds   INR     Status: Normal   Result Value Ref Range    INR 1.00 0.85 - 1.15   Comprehensive metabolic panel     Status: Abnormal   Result Value  Ref Range    Sodium 135 135 - 145 mmol/L    Potassium 4.5 3.4 - 5.3 mmol/L    Carbon Dioxide (CO2) 32 (H) 22 - 29 mmol/L    Anion Gap 4 (L) 7 - 15 mmol/L    Urea Nitrogen 19.9 8.0 - 23.0 mg/dL    Creatinine 1.22 (H) 0.67 - 1.17 mg/dL    GFR Estimate 63 >60 mL/min/1.73m2    Calcium 9.7 8.8 - 10.4 mg/dL    Chloride 99 98 - 107 mmol/L    Glucose 109 (H) 70 - 99 mg/dL    Alkaline Phosphatase 154 (H) 40 - 150 U/L    AST 25 0 - 45 U/L    ALT 21 0 - 70 U/L    Protein Total 7.1 6.4 - 8.3 g/dL    Albumin 4.4 3.5 - 5.2 g/dL    Bilirubin Total 0.5 <=1.2 mg/dL   UA with Microscopic reflex to Culture     Status: Abnormal    Specimen: Urine, Midstream   Result Value Ref Range    Color Urine Yellow Colorless, Straw, Light Yellow, Yellow    Appearance Urine Clear Clear    Glucose Urine Negative Negative mg/dL    Bilirubin Urine Negative Negative    Ketones Urine Negative Negative mg/dL    Specific Gravity Urine 1.006 1.003 - 1.035    Blood Urine Negative Negative    pH Urine 7.0 5.0 - 7.0    Protein Albumin Urine Negative Negative mg/dL    Urobilinogen Urine Normal Normal, 2.0 mg/dL    Nitrite Urine Negative Negative    Leukocyte Esterase Urine Negative Negative    Bacteria Urine Few (A) None Seen /HPF    RBC Urine 0 <=2 /HPF    WBC Urine <1 <=5 /HPF    Narrative    Urine Culture not indicated   CBC with platelets and differential     Status: None   Result Value Ref Range    WBC Count 5.7 4.0 - 11.0 10e3/uL    RBC Count 4.87 4.40 - 5.90 10e6/uL    Hemoglobin 15.4 13.3 - 17.7 g/dL    Hematocrit 44.7 40.0 - 53.0 %    MCV 92 78 - 100 fL    MCH 31.6 26.5 - 33.0 pg    MCHC 34.5 31.5 - 36.5 g/dL    RDW 11.9 10.0 - 15.0 %    Platelet Count 211 150 - 450 10e3/uL    % Neutrophils 62 %    % Lymphocytes 27 %    % Monocytes 9 %    % Eosinophils 1 %    % Basophils 1 %    % Immature Granulocytes 0 %    NRBCs per 100 WBC 0 <1 /100    Absolute Neutrophils 3.6 1.6 - 8.3 10e3/uL    Absolute Lymphocytes 1.5 0.8 - 5.3 10e3/uL    Absolute Monocytes 0.5  0.0 - 1.3 10e3/uL    Absolute Eosinophils 0.1 0.0 - 0.7 10e3/uL    Absolute Basophils 0.0 0.0 - 0.2 10e3/uL    Absolute Immature Granulocytes 0.0 <=0.4 10e3/uL    Absolute NRBCs 0.0 10e3/uL   CBC with platelets differential     Status: None    Narrative    The following orders were created for panel order CBC with platelets differential.  Procedure                               Abnormality         Status                     ---------                               -----------         ------                     CBC with platelets and d...[[id number]]                      Final result                 Please view results for these tests on the individual orders.     Medications   sodium chloride 0.9% BOLUS 1,000 mL (0 mLs Intravenous Stopped 9/29/24 1422)   cefTRIAXone (ROCEPHIN) 1 g vial to attach to  mL bag for ADULTS or NS 50 mL bag for PEDS (0 g Intravenous Stopped 9/29/24 1242)   acetaminophen (TYLENOL) tablet 1,000 mg (1,000 mg Oral $Given 9/29/24 1125)     Labs Ordered and Resulted from Time of ED Arrival to Time of ED Departure   COMPREHENSIVE METABOLIC PANEL - Abnormal       Result Value    Sodium 135      Potassium 4.5      Carbon Dioxide (CO2) 32 (*)     Anion Gap 4 (*)     Urea Nitrogen 19.9      Creatinine 1.22 (*)     GFR Estimate 63      Calcium 9.7      Chloride 99      Glucose 109 (*)     Alkaline Phosphatase 154 (*)     AST 25      ALT 21      Protein Total 7.1      Albumin 4.4      Bilirubin Total 0.5     ROUTINE UA WITH MICROSCOPIC REFLEX TO CULTURE - Abnormal    Color Urine Yellow      Appearance Urine Clear      Glucose Urine Negative      Bilirubin Urine Negative      Ketones Urine Negative      Specific Gravity Urine 1.006      Blood Urine Negative      pH Urine 7.0      Protein Albumin Urine Negative      Urobilinogen Urine Normal      Nitrite Urine Negative      Leukocyte Esterase Urine Negative      Bacteria Urine Few (*)     RBC Urine 0      WBC Urine <1     PARTIAL THROMBOPLASTIN TIME -  Normal    aPTT 26     INR - Normal    INR 1.00     CBC WITH PLATELETS AND DIFFERENTIAL    WBC Count 5.7      RBC Count 4.87      Hemoglobin 15.4      Hematocrit 44.7      MCV 92      MCH 31.6      MCHC 34.5      RDW 11.9      Platelet Count 211      % Neutrophils 62      % Lymphocytes 27      % Monocytes 9      % Eosinophils 1      % Basophils 1      % Immature Granulocytes 0      NRBCs per 100 WBC 0      Absolute Neutrophils 3.6      Absolute Lymphocytes 1.5      Absolute Monocytes 0.5      Absolute Eosinophils 0.1      Absolute Basophils 0.0      Absolute Immature Granulocytes 0.0      Absolute NRBCs 0.0       Abd/pelvis CT no contrast - Stone Protocol   Final Result   IMPRESSION:    1.  No acute abnormality in the abdomen or pelvis. No cause for left testicular pain is identified.   2.  Mild prostatic enlargement.         US Testicular & Scrotum w Doppler Ltd   Final Result   IMPRESSION:   Localized hypervascularity in the left epididymis consistent with epididymitis.                Critical care was not performed.     Medical Decision Making  The patient's presentation was of moderate complexity (an acute illness with systemic symptoms).    The patient's evaluation involved:  review of external note(s) from 3+ sources (see separate area of note for details)  review of 3+ test result(s) ordered prior to this encounter (see separate area of note for details)  ordering and/or review of 3+ test(s) in this encounter (see separate area of note for details)  discussion of management or test interpretation with another health professional (see separate area of note for details)    The patient's management necessitated moderate risk (prescription drug management including medications given in the ED).    Assessment & Plan   73-year-old male with complaints of left testicular pain started yesterday.  No lifting no previous history etc.  No dysuric symptoms no fevers or chills.  No back pain patient wonders it is referred  symptoms.  Here in the ER patient vitally stable and not hypotensive he abdominal exam is nontender there is no pulsatile mass.  Femoral pulses are equal.  No CVA tenderness no rash noted no inguinal hernia.  Testicle tender though on the left side.  No marked malrotation etc. or any large gross major swelling.  Here in the ER testicular ultrasound shows that of increasing flow on the left consistent with more of an acute epididymitis.  CT scan of the abdomen no sign of stone or other abnormalities.  Labs otherwise noted creatinine 1.22 did receive some IV fluids may be more related to fluid status etc.  Patient this point talk to urology give a gram of ceftriaxone initially patient feeling better will be discharged with Cipro per urology recommendation following up with primary physician in the next week for recheck and return if worsening symptoms fever chills etc. patient agrees with plan feels fine had received Tylenol for pain now comfortable.       I have reviewed the nursing notes. I have reviewed the findings, diagnosis, plan and need for follow up with the patient.    Discharge Medication List as of 9/29/2024  2:36 PM        START taking these medications    Details   ciprofloxacin (CIPRO) 500 MG tablet Take 1 tablet (500 mg) by mouth 2 times daily for 7 days., Disp-14 tablet, R-0, Local Print             Final diagnoses:   Left epididymitis       Bao Honeycutt  [facility] EMERGENCY DEPARTMENT  9/29/2024      This note was created at least in part by the use of dragon voice dictation system. Inadvertent typographical errors may still exist.  Bao Honeycutt MD.  Patient evaluated in the emergency department during the COVID-19 pandemic period. Careful attention to patients safety was addressed throughout the evaluation. Evaluation and treatment management was initiated with disposition made efficiently and appropriate as possible to minimize any risk of potential exposure to patient during  this evaluation.       Bao Honeycutt MD  09/29/24 3640

## 2024-10-01 DIAGNOSIS — H15.833 POSTERIOR STAPHYLOMA, BILATERAL: Primary | ICD-10-CM

## 2024-10-01 DIAGNOSIS — H52.13 MYOPIA OF BOTH EYES: ICD-10-CM

## 2024-10-02 ENCOUNTER — THERAPY VISIT (OUTPATIENT)
Dept: PHYSICAL THERAPY | Facility: CLINIC | Age: 74
End: 2024-10-02
Payer: COMMERCIAL

## 2024-10-02 DIAGNOSIS — M53.3 PAIN OF LEFT SACROILIAC JOINT: Primary | ICD-10-CM

## 2024-10-02 PROCEDURE — 97112 NEUROMUSCULAR REEDUCATION: CPT | Mod: GP | Performed by: PHYSICAL THERAPIST

## 2024-10-02 PROCEDURE — 97140 MANUAL THERAPY 1/> REGIONS: CPT | Mod: GP | Performed by: PHYSICAL THERAPIST

## 2024-10-03 ENCOUNTER — TELEPHONE (OUTPATIENT)
Dept: OPHTHALMOLOGY | Facility: CLINIC | Age: 74
End: 2024-10-03
Payer: COMMERCIAL

## 2024-10-03 NOTE — TELEPHONE ENCOUNTER
[facility] Call Center    Phone Message    May a detailed message be left on voicemail: yes     Reason for Call: Other: Patient called stating that he normally speaks with nuria Navarro regarding his prescription sunglasses. He did not give writer further details just stating that he would like to speak with her if at all possible. Please advise.      Action Taken: Message routed to:  [facility] Eye    Travel Screening: Not Applicable

## 2024-10-09 ENCOUNTER — THERAPY VISIT (OUTPATIENT)
Dept: OCCUPATIONAL THERAPY | Facility: CLINIC | Age: 74
End: 2024-10-09
Attending: OPHTHALMOLOGY
Payer: COMMERCIAL

## 2024-10-09 ENCOUNTER — THERAPY VISIT (OUTPATIENT)
Dept: PHYSICAL THERAPY | Facility: CLINIC | Age: 74
End: 2024-10-09
Payer: COMMERCIAL

## 2024-10-09 DIAGNOSIS — H15.833 POSTERIOR STAPHYLOMA, BILATERAL: ICD-10-CM

## 2024-10-09 DIAGNOSIS — M53.3 PAIN OF LEFT SACROILIAC JOINT: Primary | ICD-10-CM

## 2024-10-09 PROCEDURE — 97535 SELF CARE MNGMENT TRAINING: CPT | Mod: GO | Performed by: OCCUPATIONAL THERAPIST

## 2024-10-09 PROCEDURE — 97140 MANUAL THERAPY 1/> REGIONS: CPT | Mod: GP | Performed by: PHYSICAL THERAPIST

## 2024-10-09 PROCEDURE — 97112 NEUROMUSCULAR REEDUCATION: CPT | Mod: GP | Performed by: PHYSICAL THERAPIST

## 2024-10-09 NOTE — TELEPHONE ENCOUNTER
I spoke to the patient who likes the Topaz NOIR lens color.  The patient notes some opticians are recommending a brown tint which he doesn't like as well as Topaz.  The patient would like a gabe/orange tint that lets in 40% light.  Most polarized lenses let in 21 to 26 % light.    Patient may call [facility], [facility] and [facility] for further help.

## 2024-10-22 ENCOUNTER — MYC MEDICAL ADVICE (OUTPATIENT)
Dept: INTERNAL MEDICINE | Facility: CLINIC | Age: 74
End: 2024-10-22
Payer: COMMERCIAL

## 2024-10-23 DIAGNOSIS — H43.393 VITREOUS SYNERESIS, BILATERAL: Primary | ICD-10-CM

## 2024-11-04 ENCOUNTER — TELEPHONE (OUTPATIENT)
Dept: OPTOMETRY | Facility: CLINIC | Age: 74
End: 2024-11-04

## 2024-11-04 NOTE — TELEPHONE ENCOUNTER
[facility] Call Center    Phone Message    May a detailed message be left on voicemail: yes     Reason for Call: Appointment Intake    Referring Provider Name: N/A  Diagnosis and/or Symptoms: per pt Dr Saenz sent pt an email advising him she is out of the office this week but patient may be seen by Heidi for his CL, pt is requesting to be seen if possible before his appt on 11/6/24 with Dr Chang, please have Heidi contact pt to discuss email and options pt advised if he does not answer his cell phone his wifes number may be called or his home number at [phone]  Thank you     Action Taken: Message routed to:  [facility] eye    Travel Screening: Not Applicable     Date of Service:

## 2024-11-05 ENCOUNTER — THERAPY VISIT (OUTPATIENT)
Dept: PHYSICAL THERAPY | Facility: CLINIC | Age: 74
End: 2024-11-05
Payer: COMMERCIAL

## 2024-11-05 DIAGNOSIS — M25.552 LEFT-SIDED ISCHIAL PAIN: ICD-10-CM

## 2024-11-05 DIAGNOSIS — M53.3 PAIN OF LEFT SACROILIAC JOINT: Primary | ICD-10-CM

## 2024-11-05 PROCEDURE — 97140 MANUAL THERAPY 1/> REGIONS: CPT | Mod: GP | Performed by: PHYSICAL THERAPIST

## 2024-11-05 PROCEDURE — 97112 NEUROMUSCULAR REEDUCATION: CPT | Mod: GP | Performed by: PHYSICAL THERAPIST

## 2024-11-05 NOTE — PROGRESS NOTES
PROGRESS  REPORT    Progress reporting period is from 8-28-24 to 11-5-2024.       SUBJECTIVE  Subjective changes noted by patient:   Moderate improvement overall.        Current pain level is 2/10  .     Previous pain level was   1-2/10   .   Changes in function:  Yes (See Goal flowsheet attached for changes in current functional level)  Adverse reaction to treatment or activity: None    OBJECTIVE  Changes noted in objective findings:  OBJECTIVE  Changes noted in objective findings:   ROM:   (Degrees) Left AROM Left PROM  Right AROM Right PROM   Hip Flexion 115   115     Hip Extension 10   10     Hip Abduction 15   15     Hip Adduction           Hip Internal Rotation 10   10     Hip External Rotation 25   30     Knee Flexion wnl         Knee Extension wnl         Lumbar Side glide       Lumbar Flexion     Lumbar Extension     Pain:   End feel:   PELVIC/SI SCREEN:  right posterior psis   STRENGTH:   mmt: hip flexors: 5- /5 (L), 5-/5 (R) gluteus medius: 4/5-4+/5  (R) , 4+/5- 5-/5 (L), hip extensors; 4+/5 (R), 4/5-4+/5 (l), hip ir: 5/5 (B), hip er: 5/5 (B), hamstrings: 5-/5 (L) 5-/5 (R)     FLEXIBILITY: left greater than right piriformis and hamstring muscle tightness   PALPATION:   mild decrease in lumbar extensor, left piriformis, left gluteus medius muscle tightness and moderate left greater than right loss of gluteus ted muscle tone           ASSESSMENT/PLAN  Updated problem list and treatment plan:  Diagnosis 1:  Left ischeal pain  Pain -  hot/cold therapy, self management, education, directional preference exercise, and home program  Decreased ROM/flexibility - manual therapy, therapeutic exercise, therapeutic activity, and home program  Decreased strength - therapeutic exercise, therapeutic activities, and home program  Impaired muscle performance - neuro re-education and home program  Decreased function - therapeutic activities and home program  Diagnosis 2:  left sij   Pain -  hot/cold therapy, self  management, education, directional preference exercise, and home program  Decreased ROM/flexibility - manual therapy, therapeutic exercise, therapeutic activity, and home program  Decreased strength - therapeutic exercise, therapeutic activities, and home program  Impaired muscle performance - neuro re-education and home program  STG/LTGs have been met or progress has been made towards goals:  Yes (See Goal flow sheet completed today.)  Assessment of Progress: The patient's condition is improving.  Self Management Plans:  Patient has been instructed in a home treatment program.  Patient  has been instructed in self management of symptoms.  I have re-evaluated this patient and find that the nature, scope, duration and intensity of the therapy is appropriate for the medical condition of the patient.  Jefry continues to require the following intervention to meet STG and LTG's:  PT    Recommendations:  This patient would benefit from continued therapy.     Frequency:  1 X week, once daily  Duration:  for 8 weeks    Please refer to the daily flowsheet for treatment today, total treatment time and time spent performing 1:1 timed codes.        [facility]                                                                                   OUTPATIENT PHYSICAL THERAPY    PLAN OF TREATMENT FOR OUTPATIENT REHABILITATION   Patient's Last Name, First Name, PEDRO HatfieldJefry  David YOB: 1950   Provider's Name   [facility]   Medical Record No.  [id number]     Onset Date: 11/06/23  Start of Care Date: 06/24/24     Medical Diagnosis:  Pain of left sacroiliac joint      PT Treatment Diagnosis:  Pain of left sacroiliac joint Plan of Treatment  Frequency/Duration: 1x/week/ 8 weeks    Certification date from 10/24/24 to 12/31/24         See note for plan of treatment details and functional goals     Jing Osuna, PT                         I CERTIFY  THE NEED FOR THESE SERVICES FURNISHED UNDER        THIS PLAN OF TREATMENT AND WHILE UNDER MY CARE     (Physician attestation of this document indicates review and certification of the therapy plan).              Referring Provider:  Danish Butt    Initial Assessment  See Epic Evaluation- Start of Care Date: 06/24/24            PLAN  Continue therapy per current plan of care.    Beginning/End Dates of Progress Note Reporting Period:  11/05/24 to 11/05/2024    Referring Provider:  Danish Butt

## 2024-11-06 ENCOUNTER — OFFICE VISIT (OUTPATIENT)
Dept: OPHTHALMOLOGY | Facility: CLINIC | Age: 74
End: 2024-11-06
Attending: OPHTHALMOLOGY
Payer: COMMERCIAL

## 2024-11-06 DIAGNOSIS — H43.393 VITREOUS SYNERESIS, BILATERAL: ICD-10-CM

## 2024-11-06 PROCEDURE — 92134 CPTRZ OPH DX IMG PST SGM RTA: CPT | Performed by: OPHTHALMOLOGY

## 2024-11-06 PROCEDURE — G0463 HOSPITAL OUTPT CLINIC VISIT: HCPCS | Performed by: OPHTHALMOLOGY

## 2024-11-06 PROCEDURE — 99214 OFFICE O/P EST MOD 30 MIN: CPT | Mod: GC | Performed by: OPHTHALMOLOGY

## 2024-11-06 ASSESSMENT — VISUAL ACUITY
OD_CC: 20/300
CORRECTION_TYPE: CONTACTS
METHOD: SNELLEN - LINEAR
METHOD: SNELLEN - LINEAR
OD_PH_CC: 20/150
OS_CC: 20/200
OS_CC: 20/200
CORRECTION_TYPE: CONTACTS
OD_CC: 20/150

## 2024-11-06 ASSESSMENT — REFRACTION_CURRENTRX
OS_DIAMETER: 14.5
OS_CYLINDER: -1.25
OS_SPHERE: -1.00
OS_BASECURVE: 8.5
OS_DIAMETER: 14.1
OS_AXIS: 160
OD_DIAMETER: 14.1
OD_BASECURVE: 8.5
OS_SPHERE: -1.50
OS_BRAND: DAILIES TOTAL 1 ASTIGMATISM
OD_BRAND: DAILIES TOTAL 1
OS_BRAND: DAILIES TOTAL 1
OS_BASECURVE: 8.6

## 2024-11-06 ASSESSMENT — EXTERNAL EXAM - RIGHT EYE: OD_EXAM: ET

## 2024-11-06 ASSESSMENT — EXTERNAL EXAM - LEFT EYE: OS_EXAM: NORMAL

## 2024-11-06 ASSESSMENT — REFRACTION_MANIFEST
OS_AXIS: 060
OS_ADD: +2.50
OD_ADD: +2.50
OS_CYLINDER: +2.00
OD_CYLINDER: +0.50
OD_SPHERE: -11.50
OD_AXIS: 117
OS_SPHERE: -0.75

## 2024-11-06 ASSESSMENT — CONF VISUAL FIELD
OS_SUPERIOR_NASAL_RESTRICTION: 3
OS_SUPERIOR_TEMPORAL_RESTRICTION: 3
OD_SUPERIOR_NASAL_RESTRICTION: 1
OD_INFERIOR_TEMPORAL_RESTRICTION: 3
OS_INFERIOR_NASAL_RESTRICTION: 1
OD_SUPERIOR_TEMPORAL_RESTRICTION: 3
OD_INFERIOR_NASAL_RESTRICTION: 1
OS_INFERIOR_TEMPORAL_RESTRICTION: 3

## 2024-11-06 ASSESSMENT — REFRACTION_WEARINGRX
OS_ADD: +2.50
OS_SPHERE: -2.25
OS_AXIS: 062
OD_CYLINDER: +0.50
OD_SPHERE: -12.00
OD_ADD: +2.50
OS_CYLINDER: +1.50
OD_AXIS: 117

## 2024-11-06 ASSESSMENT — TONOMETRY
IOP_METHOD: ICARE
OS_IOP_MMHG: 14
OD_IOP_MMHG: 15

## 2024-11-06 ASSESSMENT — SLIT LAMP EXAM - LIDS
COMMENTS: LOOSE LIDS
COMMENTS: LOOSE LIDS

## 2024-11-06 NOTE — PROGRESS NOTES
CC: follow up myopic degeneration, posterior staphylomas    HPI: Jefry Hatfield is a 74 year old male patient with history of myopic degeneration both eyes., He has been followed in the past by Dr. Gopi Anne.    Interval: Reports stable vision; no flashes and floaters   Has noticed a new anxiety medication has made eyes more dry.     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 Thin choroid  LE: posterior staphyloma, retina atrophic changes. 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     Plan:  - observe  -Discussed with patient the use of low vision aids including orcam camera  - Follow up in 12 months, sooner as needed  - follow up with low vision rehab    Kumar Jaffe PGY3    ~~~~~~~~~~~~~~~~~~~~~~~~~~~~~~~~~~   Complete documentation of historical and exam elements from today's encounter can be found in the full encounter summary report (not reduplicated in this progress note).  I personally obtained the chief complaint(s) and history of present illness.  I confirmed and edited as necessary the review of systems, past medical/surgical history, family history, social history, and examination findings as documented by others; and I examined the patient myself.  I personally reviewed the relevant tests, images, and reports as  documented above.  I personally reviewed the ophthalmic test(s) associated with this encounter, agree with the interpretation(s) as documented by the resident/fellow, and have edited the corresponding report(s) as necessary.   I formulated and edited as necessary the assessment and plan and discussed the findings and management plan with the patient and family    Lisa Chang MD  .  Retina Service   Department of Ophthalmology and Visual Neurosciences   [facility]  Phone: [phone]   Fax: [phone]

## 2024-11-06 NOTE — NURSING NOTE
"Chief Complaints and History of Present Illnesses   Patient presents with    Follow Up     Pt here for yearly follow up follow up myopic degeneration, posterior staphyloma.     Contact Lens Evaluation     Pt needing to update contact lenses as well.   Pt wearing -11.00 right eye and -1.50 Sph left eye- does not feel like there is a big difference as he doesn't wear a lens on left eye as often.   Vision is largely unchanged- kirkpatrick are working well.      Chief Complaint(s) and History of Present Illness(es)       Follow Up              Laterality: both eyes    Comments: Pt here for yearly follow up follow up myopic degeneration, posterior staphyloma.               Contact Lens Evaluation              Comments: Pt needing to update contact lenses as well.   Pt wearing -11.00 right eye and -1.50 Sph left eye- does not feel like there is a big difference as he doesn't wear a lens on left eye as often.   Vision is largely unchanged- kirkpatrick are working well.               Comments    Pt has largely unchanged vision since last exam. Pt notes he is taking a medication that \"dries my eyes out\".     LYNNETTE Stephenson on 11/6/2024 at 12:31 PM                     "

## 2024-11-12 ENCOUNTER — THERAPY VISIT (OUTPATIENT)
Dept: PHYSICAL THERAPY | Facility: CLINIC | Age: 74
End: 2024-11-12
Payer: COMMERCIAL

## 2024-11-12 DIAGNOSIS — M25.552 LEFT-SIDED ISCHIAL PAIN: ICD-10-CM

## 2024-11-12 DIAGNOSIS — M53.3 PAIN OF LEFT SACROILIAC JOINT: Primary | ICD-10-CM

## 2024-11-12 PROCEDURE — 97140 MANUAL THERAPY 1/> REGIONS: CPT | Mod: GP | Performed by: PHYSICAL THERAPIST

## 2024-11-12 PROCEDURE — 97112 NEUROMUSCULAR REEDUCATION: CPT | Mod: GP | Performed by: PHYSICAL THERAPIST

## 2024-11-12 ASSESSMENT — ACTIVITIES OF DAILY LIVING (ADL)
HOS_ADL_HIGHEST_POTENTIAL_SCORE: 68
WALKING_INITIALLY: NO DIFFICULTY AT ALL
ROLLING_OVER_IN_BED: NO DIFFICULTY AT ALL
DEEP_SQUATTING: NO DIFFICULTY AT ALL
GETTING_INTO_AND_OUT_OF_AN_AVERAGE_CAR: SLIGHT DIFFICULTY
TWISTING/PIVOTING_ON_INVOLVED_LEG: SLIGHT DIFFICULTY
WALKING_UP_STEEP_HILLS: NO DIFFICULTY AT ALL
SITTING_FOR_15_MINUTES: NO DIFFICULTY AT ALL
HEAVY_WORK: SLIGHT DIFFICULTY
HOS_ADL_SCORE(%): 89.71
WALKING_15_MINUTES_OR_GREATER: NO DIFFICULTY AT ALL
HOS_ADL_COUNT: 17
GOING_UP_1_FLIGHT_OF_STAIRS: NO DIFFICULTY AT ALL
GETTING_INTO_AND_OUT_OF_A_BATHTUB: NO DIFFICULTY AT ALL
PUTTING_ON_SOCKS_AND_SHOES: NO DIFFICULTY AT ALL
STANDING_FOR_15_MINUTES: SLIGHT DIFFICULTY
WALKING_DOWN_STEEP_HILLS: SLIGHT DIFFICULTY
LIGHT_TO_MODERATE_WORK: SLIGHT DIFFICULTY
HOS_ADL_ITEM_SCORE_TOTAL: 61
RECREATIONAL_ACTIVITIES: SLIGHT DIFFICULTY
GOING_DOWN_1_FLIGHT_OF_STAIRS: NO DIFFICULTY AT ALL
STEPPING_UP_AND_DOWN_CURBS: NO DIFFICULTY AT ALL
WALKING_APPROXIMATELY_10_MINUTES: NO DIFFICULTY AT ALL

## 2024-11-12 NOTE — PROGRESS NOTES
DISCHARGE REPORT    Progress reporting period is from 11-5-2024 to 11-.       SUBJECTIVE  Subjective changes noted by patient:   Moderate overall improvement with course of therapy . Sitting one hour with 1/10        Current pain level is  1-2/10   .     Previous pain level was  2/10  .   Changes in function:  Yes (See Goal flowsheet attached for changes in current functional level)  Adverse reaction to treatment or activity: None    OBJECTIVE    Changes noted in objective findings:   ROM:   (Degrees) Left AROM Left PROM  Right AROM Right PROM   Hip Flexion 115   115     Hip Extension 10   10     Hip Abduction 15   15     Hip Adduction           Hip Internal Rotation 10   10     Hip External Rotation 25   30     Knee Flexion wnl         Knee Extension wnl         Lumbar Side glide       Lumbar Flexion     Lumbar Extension     Pain:   End feel:   PELVIC/SI SCREEN:  right posterior psis   STRENGTH:   mmt: hip flexors: 5 /5 (L), 5/5 (R) gluteus medius: 4/5-4+/5  (R) , 4+/5- 5-/5 (L), hip extensors; 4+/5 (R), 4/5-4+/5 (l), hip ir: 5/5 (B), hip er: 5/5 (B), hamstrings: 5-/5 (L) 5-/5 (R)     FLEXIBILITY: left greater than right piriformis and hamstring muscle tightness   PALPATION:  moderate r lumbar extensor, left piriformis, left gluteus medius muscle tightness and moderate left greater than right loss of gluteus ted muscle tone           ASSESSMENT/PLAN  Updated problem list and treatment plan: Diagnosis 1:  Left ischeal pain  Pain -  hot/cold therapy, self management, education, directional preference exercise, and home program  Decreased ROM/flexibility - manual therapy, therapeutic exercise, therapeutic activity, and home program  Decreased strength - therapeutic exercise, therapeutic activities, and home program  Impaired muscle performance - neuro re-education and home program  Decreased function - therapeutic activities and home program  Diagnosis 2:  left sij   Pain -  hot/cold therapy, self  management, education, directional preference exercise, and home program  Decreased ROM/flexibility - manual therapy, therapeutic exercise, therapeutic activity, and home program  Decreased strength - therapeutic exercise, therapeutic activities, and home program  Impaired muscle performance - neuro re-education and home program  STG/LTGs have been met or progress has been made towards goals:  Yes (See Goal flow sheet completed today.)  Assessment of Progress: The patient's condition is improving.  Self Management Plans:  Patient is independent in a home treatment program.  Patient is independent in self management of symptoms.  I have re-evaluated this patient and find that the nature, scope, duration and intensity of the therapy is appropriate for the medical condition of the patient.  Jefry continues to require the following intervention to meet STG and LTG's:  PT intervention is no longer required to meet STG/LTG.    Recommendations:  This patient is ready to be discharged from therapy and continue their home treatment program.    Please refer to the daily flowsheet for treatment today, total treatment time and time spent performing 1:1 timed codes.

## 2024-11-14 ENCOUNTER — OFFICE VISIT (OUTPATIENT)
Dept: INTERNAL MEDICINE | Facility: CLINIC | Age: 74
End: 2024-11-14
Payer: COMMERCIAL

## 2024-11-14 VITALS
HEART RATE: 86 BPM | RESPIRATION RATE: 16 BRPM | HEIGHT: 72 IN | BODY MASS INDEX: 21.56 KG/M2 | OXYGEN SATURATION: 99 % | WEIGHT: 159.2 LBS | DIASTOLIC BLOOD PRESSURE: 85 MMHG | SYSTOLIC BLOOD PRESSURE: 130 MMHG

## 2024-11-14 DIAGNOSIS — J02.9 SORE THROAT: ICD-10-CM

## 2024-11-14 DIAGNOSIS — N52.9 ERECTILE DYSFUNCTION, UNSPECIFIED ERECTILE DYSFUNCTION TYPE: ICD-10-CM

## 2024-11-14 DIAGNOSIS — R39.11 URINARY HESITANCY: Primary | ICD-10-CM

## 2024-11-14 DIAGNOSIS — Z12.83 SCREENING FOR SKIN CANCER: ICD-10-CM

## 2024-11-14 NOTE — PROGRESS NOTES
Assessment & Plan     Urinary hesitancy  ***  - Physical Therapy  Referral    Erectile dysfunction, unspecified erectile dysfunction type  ***  - Physical Therapy  Referral    Screening for skin cancer  ***  - Adult Dermatology  Referral          MED REC REQUIRED{TIP  Click the link below to document or use med rec list, use list to pull in response :696958}  Post Medication Reconciliation Status: {MED REC LIST:816788}    {FOLLOW UP PLANS (Optional) Includes COVID19 Treatment Plan:731341}    No follow-ups on file.    Sunil Capps is a 74 year old, presenting for the following health issues:  ER F/U (Pt would like to follow up on ED visit 9/29/2024; recheck medications)      11/14/2024     8:39 AM   Additional Questions   Roomed by [facility]     Jefry Hatfield presents to the clinic today for follow-up.    He has felt improved since completing cipro in early October. ***    He was started on PPI in September for a sore throat. He continues to feel a sore throat though he does think this is less. He continues to feel dry, nasal congestion. Uses a saline nasal spray which is helpful.     Swims twice per week, will run, ning chi every morning. Feels somewhat run down today, but was out later last night playing trivia. Went to bed later last night watching basketball.         He reports a pain in his left abdomen intermittently. He thinks it is musculoskeletal.     ED on occasion, would like PT, has worked with pelvic therapy in the past.     Review of Systems  Constitutional, HEENT, cardiovascular, pulmonary, gi and gu systems are negative, except as otherwise noted.      Objective    /85 (BP Location: Right arm, Patient Position: Sitting, Cuff Size: Adult Regular)   Pulse 86   Resp 16   Ht 1.829 m (6')   Wt 72.2 kg (159 lb 3.2 oz)   SpO2 99%   BMI 21.59 kg/m    Body mass index is 21.59 kg/m .  Physical Exam   {Exam List (Optional):017428}    {Diagnostic  Test Results (Optional):029222}        Signed Electronically by: Josselyn Lugo NP  {Email feedback regarding this note to [email or web address]   :[id number]}

## 2024-12-12 ENCOUNTER — TELEPHONE (OUTPATIENT)
Dept: OCCUPATIONAL THERAPY | Facility: CLINIC | Age: 74
End: 2024-12-12
Payer: COMMERCIAL

## 2025-01-07 ENCOUNTER — THERAPY VISIT (OUTPATIENT)
Dept: PHYSICAL THERAPY | Facility: CLINIC | Age: 75
End: 2025-01-07
Payer: COMMERCIAL

## 2025-01-07 ENCOUNTER — MYC MEDICAL ADVICE (OUTPATIENT)
Dept: INTERNAL MEDICINE | Facility: CLINIC | Age: 75
End: 2025-01-07

## 2025-01-07 DIAGNOSIS — M62.89 PELVIC FLOOR DYSFUNCTION: Primary | ICD-10-CM

## 2025-01-07 DIAGNOSIS — R39.11 URINARY HESITANCY: ICD-10-CM

## 2025-01-07 DIAGNOSIS — N52.9 ERECTILE DYSFUNCTION, UNSPECIFIED ERECTILE DYSFUNCTION TYPE: ICD-10-CM

## 2025-01-07 PROCEDURE — 97161 PT EVAL LOW COMPLEX 20 MIN: CPT | Mod: GP | Performed by: PHYSICAL THERAPIST

## 2025-01-07 PROCEDURE — 97535 SELF CARE MNGMENT TRAINING: CPT | Mod: GP | Performed by: PHYSICAL THERAPIST

## 2025-01-07 PROCEDURE — 97530 THERAPEUTIC ACTIVITIES: CPT | Mod: GP | Performed by: PHYSICAL THERAPIST

## 2025-01-07 PROCEDURE — 97140 MANUAL THERAPY 1/> REGIONS: CPT | Mod: GP | Performed by: PHYSICAL THERAPIST

## 2025-01-07 NOTE — PROGRESS NOTES
PHYSICAL THERAPY EVALUATION  Type of Visit: Evaluation              Subjective   Patient reports to physical therapy for sexual dysfunction and urinary issues that have been ongoing, was a previous patient in 2023 with good success and has now forgotten about the exercises.  He reports not being able to have sex since last February, he was on medication for anxiety in the last year and knows that has had some effect systemically.  Felt that the manual therapy helped quite a bit last time, was able to be sexually active.  Stopped doing exercises and having more anxiety which made pelvic floor symptoms worse.  Takes saw palmetto vitamin.  Urinary issues are maybe better than they were the first time that he came in 2023.  He reports urinating once every 2 hours, leaks happen rarely.  Has some slow weak stream that is worse at night, urgency is worse over night.  He did wake up with erection 3-4 times over the last couple of weeks, maybe due to core work he is doing.  He was able to get some erection with self pleasure with orgasm, 50% of normal erection.  Has not tried erection medication or any other erectile function devices.        Presenting condition or subjective complaint:    Date of onset:      Relevant medical history:     Dates & types of surgery:      Prior diagnostic imaging/testing results:       Prior therapy history for the same diagnosis, illness or injury:        Prior Level of Function  Transfers: Independent  Ambulation: Independent  ADL: Independent  IADL: Finances, Housekeeping, Laundry, Meal preparation, Medication management    Living Environment  Social support:     Type of home:     Stairs to enter the home:         Ramp:     Stairs inside the home:         Help at home:    Equipment owned:       Employment:      Hobbies/Interests:      Patient goals for therapy:      Pain assessment: See objective evaluation for additional pain details     Objective      PELVIC EVALUATION  ADDITIONAL  HISTORY:  Sex assigned at birth: (Patient-Rptd) Male  Gender identity: (Patient-Rptd) Male    Pronouns: (Patient-Rptd) He/Him/His      Bladder History:  Feels bladder filling: (Patient-Rptd) Yes  Triggers for feeling of inability to wait to go to the bathroom: (Patient-Rptd) No    How long can you wait to urinate: (Patient-Rptd) 10 min  Gets up at night to urinate: (Patient-Rptd) Yes (Patient-Rptd) 2  Can stop the flow of urine when urinating: (Patient-Rptd) Sometimes  Volume of urine usually released: (Patient-Rptd) Average   Other issues: (Patient-Rptd) Slow or hesitant urine stream  Number of bladder infections in last 12 months:    Fluid intake per day: (Patient-Rptd) 60      Medications taken for bladder: (Patient-Rptd) No     Activities causing urine leak: (Patient-Rptd) Hurrying to the bathroom due to a strong urge to urinate (pee)    Amount of urine typically leaked: (Patient-Rptd) small  Pads used to help with leaking: (Patient-Rptd) No        Bowel History:  Frequency of bowel movement: (Patient-Rptd) 1-2days  Consistency of stool: (Patient-Rptd) Soft-formed    Ignores the urge to defecate: (Patient-Rptd) No  Other bowel issues:    Length of time spent trying to have a bowel movement:      Sexual Function History:  Sexual orientation: (Patient-Rptd) Straight    Sexually active: (Patient-Rptd) Yes  Lubrication used: (Patient-Rptd) Yes (Patient-Rptd) Yes  Pelvic pain:      Pain or difficulty with orgasms/erection/ejaculation: (Patient-Rptd) Yes (Patient-Rptd) difficult to get erection  State of menopause:    Hormone medications:        Are you currently pregnant: (Patient-Rptd) No  Do you get regular exercise: (Patient-Rptd) Yes  Have you tried pelvic floor strengthening exercises for 4 weeks: (Patient-Rptd) No  Do you have any history of trauma that is relevant to your care that you d like to share: (Patient-Rptd) No      Discussed reason for referral regarding pelvic health needs and external/internal  pelvic floor muscle examination with patient/guardian.  Opportunity provided to ask questions and verbal consent for assessment and intervention was given.    PAIN: Pain Level at Rest: 0/10  POSTURE:   LUMBAR SCREEN:  not tested due to recent visit with PT for SI joint pain/IT band issues.    HIP SCREEN:  Strength:    Functional Strength Testing:     PELVIC/SI SCREEN:     PAIN PROVOCATION TEST:   PELVIS/SI SPECIAL TESTS:   BREATHING SYMMETRY:     PELVIC EXAM  External Visual Inspection:  At rest: Elevated perineal body  With voluntary pelvic floor contraction: Perineal elevation  Relaxation of PFM: Partial/delayed relaxation  With intra-abdominal pressure: Cough: Perineal descent  Valsalva: Perineal descent  Bearing down as defecation: Perineal descent    Integumentary:   Anal: WFL    External Digital Palpation per Perineum:   Ischiocavernosis: Unremarkable  Bulbo cavernosis: Tightness, Tenderness  Transverse perineal: Tightness, Tenderness  Perineal body: Tightness, Tenderness, Pain    Scar:   Location/Type:   Mobility:     Internal Digital Palpation:  Per Vagina:      Per Rectum:        Pelvic Organ Prolapse:       ABDOMINAL ASSESSMENT  Diastasis Rectus Abdominis (GONZALO):      Abdominal Activation/Strength:     Scar:   Location/Type:   Mobility:     Fascial Tension/Restriction:     BIOFEEDBACK:  Position:   Surface Electrodes:     Abdominals:     Perianals:       DERMATOMES:   DTR S:     Assessment & Plan   CLINICAL IMPRESSIONS  Medical Diagnosis:      Treatment Diagnosis:     Impression/Assessment: Patient is a 74 year old male with pelvic floor complaints.  The following significant findings have been identified: Pain, Decreased ROM/flexibility, Decreased joint mobility, Decreased strength, Impaired muscle performance, and Decreased activity tolerance. These impairments interfere with their ability to perform self care tasks and recreational activities as compared to previous level of function.     Clinical  Decision Making (Complexity):  Clinical Presentation: Stable/Uncomplicated  Clinical Presentation Rationale: based on medical and personal factors listed in PT evaluation  Clinical Decision Making (Complexity): Low complexity    PLAN OF CARE  Treatment Interventions:  Modalities: Biofeedback  Interventions: Manual Therapy, Neuromuscular Re-education, Therapeutic Activity, Therapeutic Exercise, Self-Care/Home Management    Long Term Goals            Frequency of Treatment:    Duration of Treatment:      Recommended Referrals to Other Professionals:  No further referral needed   Education Assessment:        Risks and benefits of evaluation/treatment have been explained.   Patient/Family/caregiver agrees with Plan of Care.     Evaluation Time:        Evaluation Only     Signing Clinician: Michelle Rush, PT        [facility]                                                                                   OUTPATIENT PHYSICAL THERAPY      PLAN OF TREATMENT FOR OUTPATIENT REHABILITATION   Patient's Last Name, First Name, Jefry Garzon YOB: 1950   Provider's Name   [facility]   Medical Record No.  [id number]     Onset Date:    Start of Care Date:       Medical Diagnosis:         PT Treatment Diagnosis:    Plan of Treatment  Frequency/Duration:  /      Certification date from   to           See note for plan of treatment details and functional goals     Michelle Rush, PT                         I CERTIFY THE NEED FOR THESE SERVICES FURNISHED UNDER        THIS PLAN OF TREATMENT AND WHILE UNDER MY CARE     (Physician attestation of this document indicates review and certification of the therapy plan).              Referring Provider:  Josselyn Lugo    Initial Assessment  See Epic Evaluation-

## 2025-01-08 NOTE — TELEPHONE ENCOUNTER
Recommend evaluation in [facility] clinic in the next week and we can then decide about dermatology (most likely he will need this ) but have to decide on urgency      ThanksESCOBAR

## 2025-01-09 ENCOUNTER — THERAPY VISIT (OUTPATIENT)
Dept: OCCUPATIONAL THERAPY | Facility: CLINIC | Age: 75
End: 2025-01-09
Attending: OPHTHALMOLOGY
Payer: COMMERCIAL

## 2025-01-09 DIAGNOSIS — H15.833 POSTERIOR STAPHYLOMA, BILATERAL: Primary | ICD-10-CM

## 2025-01-09 PROBLEM — M62.89 PELVIC FLOOR DYSFUNCTION: Status: ACTIVE | Noted: 2025-01-09

## 2025-01-09 PROBLEM — R39.11 URINARY HESITANCY: Status: ACTIVE | Noted: 2023-04-06

## 2025-01-09 PROBLEM — N52.9 ERECTILE DYSFUNCTION, UNSPECIFIED ERECTILE DYSFUNCTION TYPE: Status: ACTIVE | Noted: 2023-04-06

## 2025-01-09 PROCEDURE — 97535 SELF CARE MNGMENT TRAINING: CPT | Mod: GO | Performed by: OCCUPATIONAL THERAPIST

## 2025-01-09 NOTE — TELEPHONE ENCOUNTER
Patient confirmed scheduled appointment:  Date: Jan 21st   Time: 1pm  Visit type: Return   Provider: Josselyn Lugo  Location: [facility]

## 2025-01-21 ENCOUNTER — OFFICE VISIT (OUTPATIENT)
Dept: INTERNAL MEDICINE | Facility: CLINIC | Age: 75
End: 2025-01-21
Payer: COMMERCIAL

## 2025-01-21 VITALS
TEMPERATURE: 98 F | OXYGEN SATURATION: 100 % | HEART RATE: 65 BPM | DIASTOLIC BLOOD PRESSURE: 86 MMHG | BODY MASS INDEX: 21.7 KG/M2 | SYSTOLIC BLOOD PRESSURE: 129 MMHG | HEIGHT: 72 IN | RESPIRATION RATE: 18 BRPM | WEIGHT: 160.2 LBS

## 2025-01-21 DIAGNOSIS — Z23 NEED FOR PNEUMOCOCCAL 20-VALENT CONJUGATE VACCINATION: ICD-10-CM

## 2025-01-21 DIAGNOSIS — L98.9 SKIN LESION: Primary | ICD-10-CM

## 2025-01-21 PROCEDURE — 90677 PCV20 VACCINE IM: CPT

## 2025-01-21 PROCEDURE — 99213 OFFICE O/P EST LOW 20 MIN: CPT | Mod: 25

## 2025-01-21 PROCEDURE — G0009 ADMIN PNEUMOCOCCAL VACCINE: HCPCS

## 2025-01-21 NOTE — PROGRESS NOTES
Assessment & Plan     Skin lesion  Scaly, sometimes crusted, lesion which has been present for months. He has tried different OTC management without benefit. He is interested in possible removal. Would recommend dermatology for further evaluation. Referral replaced.   - Adult Dermatology  Referral    Need for pneumococcal 20-valent conjugate vaccination  Ordered.   - Pneumococcal 20 Valent Conjugate (PCV20)    No follow-ups on file.    Sunil Capps is a 74 year old, presenting for the following health issues:  Ear Problem (Skin crust on right ear.)        1/21/2025    12:49 PM   Additional Questions   Roomed by [facility]   Accompanied by Humaira (spouse)     History of Present Illness       Reason for visit:  Recurring sore on nose with crusty dischsrgr  Symptom intensity:  Mild  Symptom progression:  Staying the same  Had these symptoms before:  No  What makes it worse:  Na  What makes it better:  Na   He is taking medications regularly.     Jefry Hatfield presents to the clinic today for concerns of a skin lesion on his ear. Dermatology referral was previously placed on 11/14/24.     He notes the lesion on his ear has been present for months, mild tenderness. No drainage. No itching. Interested in possible removal. No noticed changes, though might have less scale recently.     Review of Systems  Constitutional, HEENT, cardiovascular, pulmonary, gi and gu systems are negative, except as otherwise noted.      Objective    /86 (BP Location: Right arm, Patient Position: Sitting, Cuff Size: Adult Regular)   Pulse 65   Temp 98  F (36.7  C) (Oral)   Resp 18   Ht 1.829 m (6')   Wt 72.7 kg (160 lb 3.2 oz)   SpO2 100%   BMI 21.73 kg/m    Body mass index is 21.73 kg/m .  Physical Exam   GENERAL: alert and no distress  SKIN: 2 mm round, scaly papule to right pinna. No erythema.             Signed Electronically by: Josselyn Lugo NP

## 2025-01-23 ENCOUNTER — MYC MEDICAL ADVICE (OUTPATIENT)
Dept: OPTOMETRY | Facility: CLINIC | Age: 75
End: 2025-01-23

## 2025-01-23 NOTE — TELEPHONE ENCOUNTER
Spoke to pt at 1135 and offered appt at 1220 with Dr. Saenz today and pt has another appt at 1230    Pt with dry eyes and symptoms worsening recently and would like to  come in next week before leaving town for evaluation    Offered Monday next week with Dr. Saenz and pt accepts    Pt aware of date/time/location at [facility].    Josafat Ernst RN 11:49 AM 01/23/25

## 2025-01-27 ENCOUNTER — OFFICE VISIT (OUTPATIENT)
Dept: OPTOMETRY | Facility: CLINIC | Age: 75
End: 2025-01-27
Payer: COMMERCIAL

## 2025-01-27 DIAGNOSIS — H04.123 DRY EYES: Primary | ICD-10-CM

## 2025-01-27 DIAGNOSIS — Z96.1 PSEUDOPHAKIA: ICD-10-CM

## 2025-01-27 DIAGNOSIS — H52.32 ANISOMETROPIA AND ANISEIKONIA: ICD-10-CM

## 2025-01-27 DIAGNOSIS — H16.222 KERATITIS SICCA, LEFT EYE: ICD-10-CM

## 2025-01-27 DIAGNOSIS — H52.31 ANISOMETROPIA AND ANISEIKONIA: ICD-10-CM

## 2025-01-27 RX ORDER — NEOMYCIN SULFATE, POLYMYXIN B SULFATE, AND DEXAMETHASONE 3.5; 10000; 1 MG/G; [USP'U]/G; MG/G
0.5 OINTMENT OPHTHALMIC AT BEDTIME
Qty: 3.5 G | Refills: 2 | Status: SHIPPED | OUTPATIENT
Start: 2025-01-27

## 2025-01-27 ASSESSMENT — REFRACTION_CURRENTRX
OS_SPHERE: -1.00
OS_DIAMETER: 14.5
OS_BRAND: DAILIES TOTAL 1
OD_BRAND: DAILIES TOTAL 1
OS_BASECURVE: 8.5
OS_CYLINDER: SPHERE
OS_AXIS: 160
OS_BRAND: DAILIES TOTAL 1 ASTIGMATISM
OD_DIAMETER: 14.1
OS_SPHERE: -1.50
OS_BASECURVE: 8.6
OD_BASECURVE: 8.5
OS_CYLINDER: -1.25
OS_DIAMETER: 14.1
OD_CYLINDER: SPHERE

## 2025-01-27 ASSESSMENT — SLIT LAMP EXAM - LIDS: COMMENTS: LOOSE LIDS

## 2025-01-27 ASSESSMENT — VISUAL ACUITY
OS_PH_CC: 20/125
OS_PH_CC+: -1
METHOD: SNELLEN - LINEAR
OD_CC: 20/400
CORRECTION_TYPE: CONTACTS
OS_SC: 20/400
OD_PH_CC: 20/150

## 2025-01-27 ASSESSMENT — CONF VISUAL FIELD
OD_INFERIOR_TEMPORAL_RESTRICTION: 3
OD_INFERIOR_NASAL_RESTRICTION: 1
OS_INFERIOR_TEMPORAL_RESTRICTION: 3
OD_SUPERIOR_NASAL_RESTRICTION: 1
OS_SUPERIOR_NASAL_RESTRICTION: 3
OS_INFERIOR_NASAL_RESTRICTION: 1
OD_SUPERIOR_TEMPORAL_RESTRICTION: 3
OS_SUPERIOR_TEMPORAL_RESTRICTION: 3

## 2025-01-27 ASSESSMENT — TONOMETRY
OS_IOP_MMHG: 12
OD_IOP_MMHG: 13
IOP_METHOD: ICARE

## 2025-01-27 ASSESSMENT — EXTERNAL EXAM - LEFT EYE: OS_EXAM: NORMAL

## 2025-01-27 ASSESSMENT — EXTERNAL EXAM - RIGHT EYE: OD_EXAM: ET

## 2025-01-27 NOTE — NURSING NOTE
"Chief Complaints and History of Present Illnesses   Patient presents with    Dry Eye(s)     Pt here for dry eye evaluation.     Chief Complaint(s) and History of Present Illness(es)       Dry Eye(s)              Laterality: both eyes    Comments: Pt here for dry eye evaluation.              Comments    Pt notes his eyes are \"very irritated.\" Pt notes he is on a medication that \"causes dryness.\"   Pt has tried warm compresses and rewetting drops. Wants to make sure there is not an eyelash on his eye.     LYNNETTE Stephenson on 1/27/2025 at 7:59 AM                   "

## 2025-01-27 NOTE — PROGRESS NOTES
A/P  1.) Dry eye left eye>right eye  -Flaring symptoms lately despite AT and warm compress  -Getting BCL effect from right eye - generally not wearing left lens. Would consider trying lens OU for now  -Start Maxitrol denis at bedtime x 2-3 weeks  -Switch to reusable warm compress for longer lasting effects. Continue PFAT    2.) Degenerative Myopia/Anisometropia each eye s/p CE/IOL  -Overall doing well in soft CL's, using as needed. Often goes uncorrected at home due to low refractive error left eye  -Continue -11.00 right, switch back to spherical left for better BCL effect    Can follow-up prn if symptoms do not improve after several weeks for further treatment    I have confirmed the patient's CC, HPI and reviewed Past Medical History, Past Surgical History, Social History, Family History, Problem List, Medication List and agree with Tech note.     Gabriela Saenz, JAMEL TRUJILLOO BARBARAS

## 2025-01-27 NOTE — PATIENT INSTRUCTIONS
Warm compresses: Use 1-2x/day for 5-10 minutes over closed eyelids  -Joey mask  -Tranquileyes beaded mask  -Mibo heating pad  -[place] REST & RELIEF Eye Mask (Hot or Cold)  -I-RELIEF Therapy Mask  -OcuTherm Essentials Kit    You can also use a warm wet washcloth - however this frequently loses heat quickly and can dry your skin out a bit so we recommend any of the above re-usable beaded/gel eyemasks      To purchase these products you can look over-the-counter at [employer] or purchase online at the following websites:  -[email or web address]  -[email or web address]

## 2025-03-17 ENCOUNTER — THERAPY VISIT (OUTPATIENT)
Dept: PHYSICAL THERAPY | Facility: CLINIC | Age: 75
End: 2025-03-17
Payer: COMMERCIAL

## 2025-03-17 DIAGNOSIS — N52.9 ERECTILE DYSFUNCTION, UNSPECIFIED ERECTILE DYSFUNCTION TYPE: ICD-10-CM

## 2025-03-17 DIAGNOSIS — M62.89 PELVIC FLOOR DYSFUNCTION: ICD-10-CM

## 2025-03-17 DIAGNOSIS — R39.11 URINARY HESITANCY: Primary | ICD-10-CM

## 2025-03-17 PROCEDURE — 97530 THERAPEUTIC ACTIVITIES: CPT | Mod: GP | Performed by: PHYSICAL THERAPIST

## 2025-03-17 PROCEDURE — 97140 MANUAL THERAPY 1/> REGIONS: CPT | Mod: GP | Performed by: PHYSICAL THERAPIST

## 2025-03-17 PROCEDURE — 97110 THERAPEUTIC EXERCISES: CPT | Mod: GP | Performed by: PHYSICAL THERAPIST

## 2025-03-27 ENCOUNTER — THERAPY VISIT (OUTPATIENT)
Dept: OCCUPATIONAL THERAPY | Facility: CLINIC | Age: 75
End: 2025-03-27
Attending: OPHTHALMOLOGY
Payer: COMMERCIAL

## 2025-03-27 DIAGNOSIS — H15.833 POSTERIOR STAPHYLOMA, BILATERAL: Primary | ICD-10-CM

## 2025-03-27 PROCEDURE — 97535 SELF CARE MNGMENT TRAINING: CPT | Mod: GO | Performed by: OCCUPATIONAL THERAPIST

## 2025-03-27 NOTE — PROGRESS NOTES
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   OT Tx Diagnosis decreased ADL/IADL independence   Precautions/Limitations falls precaution due to low vision   Progress Note/Certification   Start Of Care Date 12/18/23   Onset of Illness/Injury or Date of Surgery 09/27/23   Therapy Frequency 1x/week, decreasing frequency as indicated   Predicted Duration 6 more months (extended due to scheduling conflicts)   Certification date from 03/12/25   Certification date to 06/08/25   Progress Note Due Date 06/08/25   Progress Note Completed Date 01/09/25   Goals   OT Goals 1;2;3;4   OT Goal 1   Goal Identifier Near Vision   Goal Description Patient will demonstrate 3 pieces of adaptive equipment and/or adaptive techniques in regards to magnification, lighting, contrast and glare for increased ADL/IADL independence with near vision tasks (reading, meal prep, medication management, writing).   Rationale In order to maximize safety and independence with performance of self-care activities;In order to safely and appropriately apply compensatory strategies with ADL/IADL performance   Goal Progress Progress made towards goal - goal continues to be appropriate until fully met/all areas addressed. See education section below for details of education completed today and the following is from previous sessions: continuous reading: audio optimal, CCTV (Closed Circuit Television) next best - OCR technology/features and distance camera successful; numerous Handwriting Strategies and AE; numerous electronic adaptations and accessibility features (voice commands, Seeing  gigi/OCR, screen reader, etc.); filters and other options to help with photophobia: patient preferred jonatan/hazelnut/FL41 tints - and NOIR #43 (extra dark jonatan color) optimal for bright sun   Target Date 06/08/25   OT Goal 2    Goal Identifier Environmental modifications   Goal Description Patient will demonstrate and/or verbalize 2 environmentally-based ADL modifications to improve visual-based ADL/IADL activities.   Rationale In order to maximize safety and independence with performance of self-care activities;In order to safely and appropriately apply compensatory strategies with ADL/IADL performance   Goal Progress Progress made towards goal - goal continues to be appropriate until fully met/all areas addressed. See education section below for details of education completed today and the following is from previous sessions: extensive education in lighting (color, brightness, principles, placement, educated in recommendations in various rooms of the home depending on current lighting situation, wall color, background color of paper, looking at screens, etc.) and through lighting assessment, patient preferred warm light (2700K) and at about 800 lumens; numerous strategies and AE to increase success and independence/safety to cook/meal prep; educated at length in color contrast and numerous strategies and home adapatations to increase independence and visibility during ADLs and IADLs; educated in strategies for grooming success; marking strategies; adaptations for cooking and AE to increase independence and success including numerous strategies to increase contrast;   Target Date 06/08/25   Subjective Report   Subjective Report Patient had a 3 week trip to [place] and found it a bit challenging visually - spouse was there to assist. Patient brought his white cane but didn't use it.  Spouse would like help with locating things in environment - he knocks things over. Not using voiceover for now. Likes to write letters - using dictate on apple devices.  Does occasionally write with pens - rolling writer - can't find these pens anymore.   Objective Measures   Objective Measures Objective Measure 1   Treatment Interventions (OT)    Interventions Self Care/Home Management   Self Care/Home Management   Self-Care/Home Mgmt/ADL, Compensatory, Meal Prep Minutes (05938) 54 Minutes   Self Care 1 Adaptive equipment and adapted strategies to maximize visual based ADLs/IADLs   Skilled Intervention Please see education section below for details of all areas of education completed today including education in AE and adapted techniques to increase visibility for ADL/IADLs.  The most successful techniques and AE are listed below.  Patient demonstrated and/or verbalized use of all of the adapated techniques and AE with min cues after increased time.  Custom typed out instructions and resources were provided in larger print regarding education completed today.  Spouse present for session.   Patient Response/Progress progress in goals 1,2,3   Education   Learner/Method Patient;Listening;Reading;Demonstration   Education Comments strong recommendation to use white cane and O&M in familiar and unfamiliar areas and extensive reasons why; strategies and adaptations to find objects misplaced: putting things back in the same spot all the time, getting organized, simplify/de-clutter, Be My Eyes to help find things, Seeing  to locate objects, getting tubs/bins to organize items; numerous Handwriting Strategies and AE for increased visibility (print, bigger and bolder, spaces), low-vision/bold pen, bold lined paper (various sizes), gel pen, numerous writing guides,  lighting placement with writing, write with CCTV, etc. - all successful today (didn't trial CCTV today but he can at home) - trial of 2x stand magnifier/lamp combo with limited success; Mini ipad: Consider looking into zoom increments on computer - this may help you tolerate and use the zoom function more and not have to change font size when typing letters; Gmail: consider an auto reply on your email to indicate there may be spelling or grammatical errors due to decreased visio; Recommend using screen  reader on computer to read your responses before sending; shortcuts and more education in screen reader/Spoken Content on apple devices   Plan   Home program NEW: download Be My Eyes and Seeing ; use zoom (increments) vs. change font when typing on computer; use screen reader to read chat messages before sending; PBS and writing - try with his CCTV; see previous for additional home program   Plan for next session continue per POC   Total Session Time   Timed Code Treatment Minutes 54   Total Treatment Time (sum of timed and untimed services) 54         [facility]                                                                                   OUTPATIENT OCCUPATIONAL THERAPY    PLAN OF TREATMENT FOR OUTPATIENT REHABILITATION   Patient's Last Name, First Name, LITOBRIAN  SalalisonJefry nelson YOB: 1950   Provider's Name   [facility]   Medical Record No.  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              Referring Provider:  Lisa Chang    Initial Assessment  See Epic Evaluation- 12/18/23       ~~~~~~~~~~~~~~~~~~~~~~~~~~~~~~~~~~  I have not examined this patient.  I have reviewed the documentation above and agree with the assessment and plan as stated above and agree with  all of its relevant components.    Lisa Chang MD  Professor of Ophthalmology  Vitreo-Retinal surgeon   Department of Ophthalmology and Visual Neurosciences   [facility]  Phone: [phone]   Fax: [phone]

## 2025-04-07 ENCOUNTER — THERAPY VISIT (OUTPATIENT)
Dept: OCCUPATIONAL THERAPY | Facility: CLINIC | Age: 75
End: 2025-04-07
Attending: OPHTHALMOLOGY
Payer: COMMERCIAL

## 2025-04-07 DIAGNOSIS — H15.833 POSTERIOR STAPHYLOMA, BILATERAL: Primary | ICD-10-CM

## 2025-04-07 PROCEDURE — 97535 SELF CARE MNGMENT TRAINING: CPT | Mod: GO | Performed by: OCCUPATIONAL THERAPIST

## 2025-04-09 ENCOUNTER — OFFICE VISIT (OUTPATIENT)
Dept: OPTOMETRY | Facility: CLINIC | Age: 75
End: 2025-04-09
Payer: COMMERCIAL

## 2025-04-09 DIAGNOSIS — H52.31 ANISOMETROPIA AND ANISEIKONIA: Primary | ICD-10-CM

## 2025-04-09 DIAGNOSIS — H04.123 DRY EYES: ICD-10-CM

## 2025-04-09 DIAGNOSIS — H52.32 ANISOMETROPIA AND ANISEIKONIA: Primary | ICD-10-CM

## 2025-04-09 DIAGNOSIS — Z96.1 PSEUDOPHAKIA: ICD-10-CM

## 2025-04-09 ASSESSMENT — REFRACTION_CURRENTRX
OS_DIAMETER: 14.1
OS_SPHERE: -1.50
OD_SPHERE: -11.00
OD_CYLINDER: SPHERE
OD_BASECURVE: 8.5
OS_BRAND: DAILIES TOTAL 1
OD_BRAND: DAILIES TOTAL 1
OS_BASECURVE: 8.5
OD_DIAMETER: 14.1
OS_CYLINDER: SPHERE

## 2025-04-09 ASSESSMENT — VISUAL ACUITY
METHOD: SNELLEN - LINEAR
OS_CC: 20/300
METHOD: SNELLEN - LINEAR
OD_CC: 20/300
OS_CC: 20/150
CORRECTION_TYPE: CONTACTS
CORRECTION_TYPE: CONTACTS

## 2025-04-09 ASSESSMENT — EXTERNAL EXAM - RIGHT EYE: OD_EXAM: ET

## 2025-04-09 ASSESSMENT — CONF VISUAL FIELD
OD_SUPERIOR_NASAL_RESTRICTION: 1
OS_SUPERIOR_NASAL_RESTRICTION: 3
OS_INFERIOR_TEMPORAL_RESTRICTION: 3
OD_SUPERIOR_TEMPORAL_RESTRICTION: 3
OS_SUPERIOR_TEMPORAL_RESTRICTION: 3
OS_INFERIOR_NASAL_RESTRICTION: 1
OD_INFERIOR_NASAL_RESTRICTION: 1
OD_INFERIOR_TEMPORAL_RESTRICTION: 3

## 2025-04-09 ASSESSMENT — TONOMETRY
OD_IOP_MMHG: 19
IOP_METHOD: ICARE
OS_IOP_MMHG: 18

## 2025-04-09 ASSESSMENT — SLIT LAMP EXAM - LIDS: COMMENTS: LOOSE LIDS

## 2025-04-09 ASSESSMENT — EXTERNAL EXAM - LEFT EYE: OS_EXAM: NORMAL

## 2025-04-09 NOTE — NURSING NOTE
"Chief Complaints and History of Present Illnesses   Patient presents with    Contact Lens Follow Up     Pt here for contact lens follow up.     Chief Complaint(s) and History of Present Illness(es)       Contact Lens Follow Up              Laterality: right eye    Comments: Pt here for contact lens follow up.              Comments    Pt would like to talk to the Dr about changing the lens power right eye. He also is having issues with eyes \"drying out.\" He notes the denis did help.     Heidi Peraza, COT on 4/9/2025 at 11:18 AM                     "

## 2025-04-09 NOTE — PROGRESS NOTES
A/P  .) Degenerative Myopia/Anisometropia each eye s/p CE/IOL  -Overall doing well in soft CL's, using as needed. Often goes uncorrected at home due to low refractive error left eye  -Further eval on right CL power today. Definitely does not need -11.50 or stronger. Sees well (20/125) with -10.50 but subjectively just a bit sharper in real life with -11.00. Would continue -11.00 right, -1.50 left    2.) Dry eye left eye>right eye  -Improved with AT, warm compress, ointment at night and BCL wear    Can follow-up prn     I have confirmed the patient's CC, HPI and reviewed Past Medical History, Past Surgical History, Social History, Family History, Problem List, Medication List and agree with Tech note.     Gabriela Saenz, JAMEL FAAO FSLS

## 2025-04-15 ENCOUNTER — LAB (OUTPATIENT)
Dept: LAB | Facility: CLINIC | Age: 75
End: 2025-04-15
Payer: COMMERCIAL

## 2025-04-15 ENCOUNTER — OFFICE VISIT (OUTPATIENT)
Dept: INTERNAL MEDICINE | Facility: CLINIC | Age: 75
End: 2025-04-15
Payer: COMMERCIAL

## 2025-04-15 VITALS
BODY MASS INDEX: 22.28 KG/M2 | DIASTOLIC BLOOD PRESSURE: 78 MMHG | OXYGEN SATURATION: 98 % | SYSTOLIC BLOOD PRESSURE: 119 MMHG | TEMPERATURE: 98.6 F | HEART RATE: 71 BPM | HEIGHT: 72 IN | RESPIRATION RATE: 16 BRPM | WEIGHT: 164.5 LBS

## 2025-04-15 DIAGNOSIS — R68.83 CHILLS: ICD-10-CM

## 2025-04-15 DIAGNOSIS — R53.83 FATIGUE, UNSPECIFIED TYPE: Primary | ICD-10-CM

## 2025-04-15 DIAGNOSIS — R53.83 FATIGUE, UNSPECIFIED TYPE: ICD-10-CM

## 2025-04-15 LAB
ALBUMIN SERPL BCG-MCNC: 4.2 G/DL (ref 3.5–5.2)
ALP SERPL-CCNC: 141 U/L (ref 40–150)
ALT SERPL W P-5'-P-CCNC: 14 U/L (ref 0–70)
ANION GAP SERPL CALCULATED.3IONS-SCNC: 7 MMOL/L (ref 7–15)
AST SERPL W P-5'-P-CCNC: 22 U/L (ref 0–45)
BASOPHILS # BLD AUTO: 0 10E3/UL (ref 0–0.2)
BASOPHILS NFR BLD AUTO: 1 %
BILIRUB SERPL-MCNC: 0.5 MG/DL
BUN SERPL-MCNC: 19.7 MG/DL (ref 8–23)
CALCIUM SERPL-MCNC: 9.5 MG/DL (ref 8.8–10.4)
CHLORIDE SERPL-SCNC: 102 MMOL/L (ref 98–107)
CREAT SERPL-MCNC: 1.17 MG/DL (ref 0.67–1.17)
EGFRCR SERPLBLD CKD-EPI 2021: 66 ML/MIN/1.73M2
EOSINOPHIL # BLD AUTO: 0.1 10E3/UL (ref 0–0.7)
EOSINOPHIL NFR BLD AUTO: 2 %
ERYTHROCYTE [DISTWIDTH] IN BLOOD BY AUTOMATED COUNT: 11.9 % (ref 10–15)
GLUCOSE SERPL-MCNC: 81 MG/DL (ref 70–99)
HCO3 SERPL-SCNC: 30 MMOL/L (ref 22–29)
HCT VFR BLD AUTO: 41.3 % (ref 40–53)
HGB BLD-MCNC: 14.1 G/DL (ref 13.3–17.7)
IMM GRANULOCYTES # BLD: 0 10E3/UL
IMM GRANULOCYTES NFR BLD: 0 %
LYMPHOCYTES # BLD AUTO: 1.7 10E3/UL (ref 0.8–5.3)
LYMPHOCYTES NFR BLD AUTO: 33 %
MCH RBC QN AUTO: 31.3 PG (ref 26.5–33)
MCHC RBC AUTO-ENTMCNC: 34.1 G/DL (ref 31.5–36.5)
MCV RBC AUTO: 92 FL (ref 78–100)
MONOCYTES # BLD AUTO: 0.6 10E3/UL (ref 0–1.3)
MONOCYTES NFR BLD AUTO: 11 %
NEUTROPHILS # BLD AUTO: 2.7 10E3/UL (ref 1.6–8.3)
NEUTROPHILS NFR BLD AUTO: 53 %
NRBC # BLD AUTO: 0 10E3/UL
NRBC BLD AUTO-RTO: 0 /100
PLATELET # BLD AUTO: 229 10E3/UL (ref 150–450)
POTASSIUM SERPL-SCNC: 4.6 MMOL/L (ref 3.4–5.3)
PROT SERPL-MCNC: 6.7 G/DL (ref 6.4–8.3)
RBC # BLD AUTO: 4.51 10E6/UL (ref 4.4–5.9)
SODIUM SERPL-SCNC: 139 MMOL/L (ref 135–145)
TSH SERPL DL<=0.005 MIU/L-ACNC: 2.03 UIU/ML (ref 0.3–4.2)
WBC # BLD AUTO: 5.1 10E3/UL (ref 4–11)

## 2025-04-15 PROCEDURE — 84443 ASSAY THYROID STIM HORMONE: CPT | Performed by: PATHOLOGY

## 2025-04-15 PROCEDURE — 85025 COMPLETE CBC W/AUTO DIFF WBC: CPT | Performed by: PATHOLOGY

## 2025-04-15 PROCEDURE — 3074F SYST BP LT 130 MM HG: CPT

## 2025-04-15 PROCEDURE — 80053 COMPREHEN METABOLIC PANEL: CPT | Performed by: PATHOLOGY

## 2025-04-15 PROCEDURE — 99213 OFFICE O/P EST LOW 20 MIN: CPT

## 2025-04-15 PROCEDURE — 3078F DIAST BP <80 MM HG: CPT

## 2025-04-15 PROCEDURE — 36415 COLL VENOUS BLD VENIPUNCTURE: CPT | Performed by: PATHOLOGY

## 2025-04-15 NOTE — PROGRESS NOTES
"  Assessment & Plan     Fatigue, unspecified type  ***  - Comprehensive metabolic panel (BMP + Alb, Alk Phos, ALT, AST, Total. Bili, TP)  - CBC with platelets and differential  - TSH with free T4 reflex    Chills  ***  - Comprehensive metabolic panel (BMP + Alb, Alk Phos, ALT, AST, Total. Bili, TP)  - CBC with platelets and differential  - TSH with free T4 reflex          No follow-ups on file.    Sunil Capps is a 74 year old, presenting for the following health issues:  Chills (Pt had some flu symptoms about 3 weeks ago. Pt still experiencing chill that come and go, feels cold all the time. )        4/15/2025     4:54 PM   Additional Questions   Roomed by aleyda     History of Present Illness       Reason for visit:  Lupillos         Jefry Hatfield presents to the clinic today for evaluation of flu-like symptoms.     About three weeks wife had a stomach bug, he then developed some symptoms (aches, fatigue) but no GI concerns. He continues to fatigued, feels cold, feels like shivering. No vomiting, no diarrhea. No dyspnea. He does experience dry mouth, no rhinorrhea.     He has been on a nortriptyline taper. Was on nortriptyline for anxiety, had side effects with higher doses. Was on 125 mg daily at one point, about a month ago was on 50 mg, has been decreasing slowly, back down to 25 mg daily. Working with psychiatry on biofeedback.       Review of Systems  Constitutional, HEENT, cardiovascular, pulmonary, gi and gu systems are negative, except as otherwise noted.      Objective    /78 (BP Location: Right arm, Patient Position: Sitting, Cuff Size: Adult Regular)   Pulse 71   Temp 98.6  F (37  C) (Oral)   Resp 16   Ht 1.829 m (6' 0.01\")   Wt 74.6 kg (164 lb 8 oz)   SpO2 98%   BMI 22.31 kg/m    Body mass index is 22.31 kg/m .  Physical Exam   GENERAL: alert and no distress  EYES: Eyes grossly normal to inspection, PERRL and conjunctivae and sclerae normal  HENT: oropharynx clear and oral " mucous membranes moist  NECK: no adenopathy, no asymmetry, masses, or scars  RESP: lungs clear to auscultation - no rales, rhonchi or wheezes  CV: regular rate and rhythm, normal S1 S2, no S3 or S4, no murmur, click or rub, no peripheral edema  MS: no gross musculoskeletal defects noted, no edema  PSYCH: mentation appears normal, affect normal/bright            Signed Electronically by: Josselyn Lugo NP  {Email feedback regarding this note to [email or web address]   :[id number]}

## 2025-05-01 ENCOUNTER — THERAPY VISIT (OUTPATIENT)
Dept: PHYSICAL THERAPY | Facility: CLINIC | Age: 75
End: 2025-05-01
Payer: COMMERCIAL

## 2025-05-01 DIAGNOSIS — R39.11 URINARY HESITANCY: Primary | ICD-10-CM

## 2025-05-01 DIAGNOSIS — N52.9 ERECTILE DYSFUNCTION, UNSPECIFIED ERECTILE DYSFUNCTION TYPE: ICD-10-CM

## 2025-05-01 DIAGNOSIS — M62.89 PELVIC FLOOR DYSFUNCTION: ICD-10-CM

## 2025-05-01 PROCEDURE — 97140 MANUAL THERAPY 1/> REGIONS: CPT | Mod: GP | Performed by: PHYSICAL THERAPIST

## 2025-05-01 PROCEDURE — 97530 THERAPEUTIC ACTIVITIES: CPT | Mod: GP | Performed by: PHYSICAL THERAPIST

## 2025-05-06 NOTE — PROGRESS NOTES
05/01/25 0500   Appointment Info   Signing clinician's name / credentials Michelle Rush, PT, DPT, WCS   Total/Authorized Visits [employer] 8   Visits Used 3   Medical Diagnosis Urinary hesitancy (R39.11), Erectile dysfunction, unspecified erectile dysfunction type (N52.9)   PT Tx Diagnosis pelvic floor dysfunction   Progress Note/Certification   Start of Care Date 01/07/25   Onset of illness/injury or Date of Surgery 11/14/24   Therapy Frequency every 1-2 weeks   Predicted Duration 12 weeks   Certification date from 04/02/25   Certification date to 06/25/25   Progress Note Completed Date 01/07/25       Present No   PT Goal 1   Goal Identifier LTG 1   Goal Description Patient will reports ability to engage in all sexual activity and intercourse as desired with pelvic pain 0/10.   Rationale to maximize safety and independence with performance of ADLs and functional tasks   Goal Progress goal not met, extending   Target Date 06/25/25   Subjective Report   Subjective Report Patient reports that his progress is slow, urination is mayble a little bit better, trying to drink more water.  Knows that anxiety is playing a role in tension in muscles.  Has pain in the middle of the night that spasms, thinks it might be better with apple cider vinegar.  Working with a psychiatrist on biofeedback and working on medications for anxiety.  Has not had any sexual activity as he has a pimple/skin issue on penile shaft so doesn't want to participate.   Objective Measures   Objective Measures Objective Measure 1   Objective Measure 1   Objective Measure observation   Details slow relaxation of PFM, very small acne at epidydmis   Treatment Interventions (PT)   Interventions Therapeutic Procedure/Exercise;Therapeutic Activity;Self Care/Home Management;Manual Therapy   Therapeutic Procedure/Exercise   Therapeutic Procedures Ther Proc 2   Ther Proc 1 lorraine pose   Ther Proc 2 butterfly   PTRx Ther Proc 1 Pelvic Floor  Muscle Strengthening Basic    Skilled Intervention PFM relaxation   Patient Response/Progress tolerates well   Therapeutic Activity   Therapeutic Activities: dynamic activities to improve functional performance minutes (32465) 20   Therapeutic Activities Ther Act 2;Ther Act 3;Ther Act 4   Ther Act 1 mindfulness   Ther Act 1 - Details discussed importance of checking in to understand PFM, how tense they are, impact of stress   Ther Act 2 erection   Ther Act 2 - Details reviewed maintaining erection, need for keeping pelvic floor relaxation during erection   PTRx Ther Act 1 Relaxed Awareness of the Pelvic Muscles   PTRx Ther Act 1 - Details reviewed checking in on pelvic floor muscles throughout the day, need for checking in and understanding pelvic floor   Skilled Intervention patient education   Patient Response/Progress tolerates well   Ther Act 3 routine   Ther Act 3 - Details reviewed need for routine, checking in on muscles, doing HEP and sticking with the massage   Ther Act 4 massage   Ther Act 4 - Details reviewed need for PFM massage, how to relax PFM   Manual Therapy   Manual Therapy: Mobilization, MFR, MLD, friction massage minutes (03746) 10   Manual Therapy Manual Therapy 2   Manual Therapy 1 MFR   Manual Therapy 1 - Details to B groin tissue, over adductor tendons using slacking technique   Manual Therapy 2 MFR   Manual Therapy 2 - Details to L BS, IC, STP using fascial release and TpR   Skilled Intervention soft tissue mobility   Patient Response/Progress tolerates well   Education   Learner/Method Patient;No Barriers to Learning   Plan   Home program given PTRx         [facility]                                                                                   OUTPATIENT PHYSICAL THERAPY    PLAN OF TREATMENT FOR OUTPATIENT REHABILITATION   Patient's Last Name, First Name, Jefry Garzon YOB: 1950   Provider's Name   [facility]   Medical Record No.  [id number]     Onset Date: 11/14/24  Start of Care Date: 01/07/25     Medical Diagnosis:  Urinary hesitancy (R39.11), Erectile dysfunction, unspecified erectile dysfunction type (N52.9)      PT Treatment Diagnosis:  pelvic floor dysfunction Plan of Treatment  Frequency/Duration: every 1-2 weeks/ 12 weeks    Certification date from 04/02/25 to 06/25/25         See note for plan of treatment details and functional goals     Michelle Rush, PT                         I CERTIFY THE NEED FOR THESE SERVICES FURNISHED UNDER        THIS PLAN OF TREATMENT AND WHILE UNDER MY CARE     (Physician attestation of this document indicates review and certification of the therapy plan).              Referring Provider:  Josselyn Lugo    Initial Assessment  See Epic Evaluation- Start of Care Date: 01/07/25

## 2025-06-02 NOTE — PROGRESS NOTES
HPI:    He states he had a GI illness a few weeks ago and then a respiratory illness after that. Overall he is getting better; he still has a little fatigue. Maybe a little cough. He did go for a 4 mile run the other day and this went fine. He denies any systemic sxs. No chest pain or shortness of breath.  No other HEENT, cardiopulmonary, abdominal, , neurological, systemic, psychiatric, lymphatic, endocrine, vascular complaints.       Past Medical History:   Diagnosis Date    Anisometropia     Cataract, right eye     Cervical radiculopathy     High myopia     Myopic degeneration     Posterior staphyloma     LE    Pseudophakia of left eye     Retinal detachment RE 1971,[id number]    BE     Past Surgical History:   Procedure Laterality Date    APPENDECTOMY      CATARACT IOL, RT/LT Bilateral 11/14/12LE 7/1/14RE    BE    COLONOSCOPY N/A 5/25/2018    Procedure: COLONOSCOPY;  Screening Colonoscopy;  Surgeon: Zak Kaplan MD;  [facility]    COLONOSCOPY N/A 7/3/2023    Procedure: COLONOSCOPY, WITH POLYPECTOMY AND BIOPSY;  Surgeon: Nancy Silvestre MD;  Location: [facility]    PHACOEMULSIFICATION CLEAR CORNEA WITH STANDARD INTRAOCULAR LENS IMPLANT  7/1/2014    Procedure: PHACOEMULSIFICATION CLEAR CORNEA WITH STANDARD INTRAOCULAR LENS IMPLANT;  Surgeon: Milan Bernardo MD;  Location:  [facility]    SCLERAL BUCKLE  1971    RE    SCLERAL BUCKLE  1980    LE    YAG CAPSULOTOMY OD (RIGHT EYE)  10/30/2014     PE:    Vitals noted, gen, nad, cooperative, alert, neck supple nl rom, lungs with fair air movement, RRR. S1, S2 no MRG, abdomen, no acute findings, Grossly normal neurological exam.       Results for orders placed or performed in visit on 06/03/25   TSH with free T4 reflex     Status: Normal   Result Value Ref Range    TSH 2.47 0.30 - 4.20 uIU/mL   Comprehensive metabolic panel     Status: Normal   Result Value Ref Range    Sodium 139 135 - 145 mmol/L    Potassium 4.5 3.4 - 5.3 mmol/L    Carbon Dioxide (CO2) 28 22  - 29 mmol/L    Anion Gap 9 7 - 15 mmol/L    Urea Nitrogen 16.2 8.0 - 23.0 mg/dL    Creatinine 1.17 0.67 - 1.17 mg/dL    GFR Estimate 66 >60 mL/min/1.73m2    Calcium 9.7 8.8 - 10.4 mg/dL    Chloride 102 98 - 107 mmol/L    Glucose 95 70 - 99 mg/dL    Alkaline Phosphatase 129 40 - 150 U/L    AST 22 0 - 45 U/L    ALT 13 0 - 70 U/L    Protein Total 6.9 6.4 - 8.3 g/dL    Albumin 4.4 3.5 - 5.2 g/dL    Bilirubin Total 0.5 <=1.2 mg/dL   CBC with platelets and differential     Status: None   Result Value Ref Range    WBC Count 5.0 4.0 - 11.0 10e3/uL    RBC Count 4.52 4.40 - 5.90 10e6/uL    Hemoglobin 14.1 13.3 - 17.7 g/dL    Hematocrit 41.3 40.0 - 53.0 %    MCV 91 78 - 100 fL    MCH 31.2 26.5 - 33.0 pg    MCHC 34.1 31.5 - 36.5 g/dL    RDW 12.6 10.0 - 15.0 %    Platelet Count 205 150 - 450 10e3/uL    % Neutrophils 55 %    % Lymphocytes 29 %    % Monocytes 10 %    % Eosinophils 5 %    % Basophils 0 %    % Immature Granulocytes 0 %    NRBCs per 100 WBC 0 <1 /100    Absolute Neutrophils 2.8 1.6 - 8.3 10e3/uL    Absolute Lymphocytes 1.5 0.8 - 5.3 10e3/uL    Absolute Monocytes 0.5 0.0 - 1.3 10e3/uL    Absolute Eosinophils 0.2 0.0 - 0.7 10e3/uL    Absolute Basophils 0.0 0.0 - 0.2 10e3/uL    Absolute Immature Granulocytes 0.0 <=0.4 10e3/uL    Absolute NRBCs 0.0 10e3/uL   CBC with platelets and differential     Status: None    Narrative    The following orders were created for panel order CBC with platelets and differential.  Procedure                               Abnormality         Status                     ---------                               -----------         ------                     CBC with platelets and ...[[id number]]                      Final result                 Please view results for these tests on the individual orders.       A/P:     1. Pelvic Health PT  next 6/12/2025  with Ms. Rush (last 5/5/2025)  2. Dermatology appt. Last seen 2/28/2025 and next 3/9/2026  3. Immunizations; He has completed the Shingrix  vaccine series Tdap 3/27/2018. Prevnar 13 done 3/27/2018. COVID Moderna vaccine x 5; Pfizer x 2.    4. Colonoscopy;  was done 7/3/2023. Could consider repeat in 5-10 years   5. PSA; 1.53 on 10/21/2022.   6. Lipids; 10/21/2022; TG's 53, HDL 59 and    7. B Iliotibal band syndrome, seen 11/14/2022 by Dr. Butt. Last visit with Dr. Butt 6/1/2024 for sacroiliac joint issues. .   8. Vitamin D level 44 on 10/21/2022  9.  Optometry with Dr. Saenz 1/27/2025  10. Ordered CXR, and labs for his current sxs.           40 minutes spent on the date of the encounter doing chart review, history and exam, documentation and further activities as noted above exclusive of procedures and other billable interpretations

## 2025-06-03 ENCOUNTER — ANCILLARY PROCEDURE (OUTPATIENT)
Dept: GENERAL RADIOLOGY | Facility: CLINIC | Age: 75
End: 2025-06-03
Attending: INTERNAL MEDICINE
Payer: COMMERCIAL

## 2025-06-03 ENCOUNTER — LAB (OUTPATIENT)
Dept: LAB | Facility: CLINIC | Age: 75
End: 2025-06-03
Payer: COMMERCIAL

## 2025-06-03 ENCOUNTER — OFFICE VISIT (OUTPATIENT)
Dept: INTERNAL MEDICINE | Facility: CLINIC | Age: 75
End: 2025-06-03
Payer: COMMERCIAL

## 2025-06-03 VITALS
BODY MASS INDEX: 22.34 KG/M2 | WEIGHT: 164.9 LBS | HEIGHT: 72 IN | SYSTOLIC BLOOD PRESSURE: 120 MMHG | RESPIRATION RATE: 16 BRPM | OXYGEN SATURATION: 99 % | TEMPERATURE: 97.9 F | HEART RATE: 83 BPM | DIASTOLIC BLOOD PRESSURE: 81 MMHG

## 2025-06-03 DIAGNOSIS — J34.2 DEVIATED NASAL SEPTUM: ICD-10-CM

## 2025-06-03 DIAGNOSIS — R53.83 OTHER FATIGUE: ICD-10-CM

## 2025-06-03 DIAGNOSIS — J34.2 DEVIATED NASAL SEPTUM: Primary | ICD-10-CM

## 2025-06-03 LAB
ALBUMIN SERPL BCG-MCNC: 4.4 G/DL (ref 3.5–5.2)
ALP SERPL-CCNC: 129 U/L (ref 40–150)
ALT SERPL W P-5'-P-CCNC: 13 U/L (ref 0–70)
ANION GAP SERPL CALCULATED.3IONS-SCNC: 9 MMOL/L (ref 7–15)
AST SERPL W P-5'-P-CCNC: 22 U/L (ref 0–45)
BASOPHILS # BLD AUTO: 0 10E3/UL (ref 0–0.2)
BASOPHILS NFR BLD AUTO: 0 %
BILIRUB SERPL-MCNC: 0.5 MG/DL
BUN SERPL-MCNC: 16.2 MG/DL (ref 8–23)
CALCIUM SERPL-MCNC: 9.7 MG/DL (ref 8.8–10.4)
CHLORIDE SERPL-SCNC: 102 MMOL/L (ref 98–107)
CREAT SERPL-MCNC: 1.17 MG/DL (ref 0.67–1.17)
EGFRCR SERPLBLD CKD-EPI 2021: 66 ML/MIN/1.73M2
EOSINOPHIL # BLD AUTO: 0.2 10E3/UL (ref 0–0.7)
EOSINOPHIL NFR BLD AUTO: 5 %
ERYTHROCYTE [DISTWIDTH] IN BLOOD BY AUTOMATED COUNT: 12.6 % (ref 10–15)
GLUCOSE SERPL-MCNC: 95 MG/DL (ref 70–99)
HCO3 SERPL-SCNC: 28 MMOL/L (ref 22–29)
HCT VFR BLD AUTO: 41.3 % (ref 40–53)
HGB BLD-MCNC: 14.1 G/DL (ref 13.3–17.7)
IMM GRANULOCYTES # BLD: 0 10E3/UL
IMM GRANULOCYTES NFR BLD: 0 %
LYMPHOCYTES # BLD AUTO: 1.5 10E3/UL (ref 0.8–5.3)
LYMPHOCYTES NFR BLD AUTO: 29 %
MCH RBC QN AUTO: 31.2 PG (ref 26.5–33)
MCHC RBC AUTO-ENTMCNC: 34.1 G/DL (ref 31.5–36.5)
MCV RBC AUTO: 91 FL (ref 78–100)
MONOCYTES # BLD AUTO: 0.5 10E3/UL (ref 0–1.3)
MONOCYTES NFR BLD AUTO: 10 %
NEUTROPHILS # BLD AUTO: 2.8 10E3/UL (ref 1.6–8.3)
NEUTROPHILS NFR BLD AUTO: 55 %
NRBC # BLD AUTO: 0 10E3/UL
NRBC BLD AUTO-RTO: 0 /100
PLATELET # BLD AUTO: 205 10E3/UL (ref 150–450)
POTASSIUM SERPL-SCNC: 4.5 MMOL/L (ref 3.4–5.3)
PROT SERPL-MCNC: 6.9 G/DL (ref 6.4–8.3)
RBC # BLD AUTO: 4.52 10E6/UL (ref 4.4–5.9)
SODIUM SERPL-SCNC: 139 MMOL/L (ref 135–145)
TSH SERPL DL<=0.005 MIU/L-ACNC: 2.47 UIU/ML (ref 0.3–4.2)
WBC # BLD AUTO: 5 10E3/UL (ref 4–11)

## 2025-06-03 PROCEDURE — 99215 OFFICE O/P EST HI 40 MIN: CPT | Performed by: INTERNAL MEDICINE

## 2025-06-03 PROCEDURE — 71046 X-RAY EXAM CHEST 2 VIEWS: CPT | Mod: GC | Performed by: STUDENT IN AN ORGANIZED HEALTH CARE EDUCATION/TRAINING PROGRAM

## 2025-06-03 PROCEDURE — 36415 COLL VENOUS BLD VENIPUNCTURE: CPT | Performed by: PATHOLOGY

## 2025-06-03 PROCEDURE — 3074F SYST BP LT 130 MM HG: CPT | Performed by: INTERNAL MEDICINE

## 2025-06-03 PROCEDURE — 84443 ASSAY THYROID STIM HORMONE: CPT | Performed by: PATHOLOGY

## 2025-06-03 PROCEDURE — 80053 COMPREHEN METABOLIC PANEL: CPT | Performed by: PATHOLOGY

## 2025-06-03 PROCEDURE — 3079F DIAST BP 80-89 MM HG: CPT | Performed by: INTERNAL MEDICINE

## 2025-06-03 PROCEDURE — 85025 COMPLETE CBC W/AUTO DIFF WBC: CPT | Performed by: PATHOLOGY

## 2025-06-03 NOTE — PROGRESS NOTES
Jefry is a 74 year old that presents in clinic today for the following:     Chief Complaint   Patient presents with    Consult     Lethargy following upper respiratory infection, sick twice in row once was stomach bug once was upper respiratory           6/3/2025     3:53 PM   Additional Questions   Roomed by [facility] EMT   Accompanied by Wife     Screenings from encounters over the past 10 days    No data recorded       Betsey Engle MA at 4:07 PM on 6/3/2025    Answers submitted by the patient for this visit:  General Questionnaire (Submitted on 6/3/2025)  Chief Complaint: Chronic problems general questions HPI Form  What is the reason for your visit today? : lethargy  How many days per week do you miss taking your medication?: 0  Questionnaire about: Chronic problems general questions HPI Form (Submitted on 6/3/2025)  Chief Complaint: Chronic problems general questions HPI Form

## 2025-06-04 ENCOUNTER — RESULTS FOLLOW-UP (OUTPATIENT)
Dept: INTERNAL MEDICINE | Facility: CLINIC | Age: 75
End: 2025-06-04

## 2025-06-04 ENCOUNTER — PATIENT OUTREACH (OUTPATIENT)
Dept: CARE COORDINATION | Facility: CLINIC | Age: 75
End: 2025-06-04
Payer: COMMERCIAL

## 2025-06-05 NOTE — TELEPHONE ENCOUNTER
FUTURE VISIT INFORMATION:  Appointment Date: 08/01/2025  Appointment Time: 2:40 PM   REFERRAL INFORMATION:  Referring By: Varinder See MD  Referring Clinic:   Reason for Visit/Diagnosis: Per pt-Deviated nasal septum [J34.2]  Other fatigue [R53.83]  . Ref by Varinder See MD. [facility] verified. Records in Epic      NOTES STATUS DETAILS   OFFICE NOTE from referring provider [facility] 06/03/2025 OV with Varinder Aragon MD

## 2025-06-10 ENCOUNTER — THERAPY VISIT (OUTPATIENT)
Dept: PHYSICAL THERAPY | Facility: CLINIC | Age: 75
End: 2025-06-10
Payer: COMMERCIAL

## 2025-06-10 DIAGNOSIS — N52.9 ERECTILE DYSFUNCTION, UNSPECIFIED ERECTILE DYSFUNCTION TYPE: ICD-10-CM

## 2025-06-10 DIAGNOSIS — M62.89 PELVIC FLOOR DYSFUNCTION: ICD-10-CM

## 2025-06-10 DIAGNOSIS — R39.11 URINARY HESITANCY: Primary | ICD-10-CM

## 2025-06-10 PROCEDURE — 97530 THERAPEUTIC ACTIVITIES: CPT | Mod: GP | Performed by: PHYSICAL THERAPIST

## 2025-06-10 PROCEDURE — 97140 MANUAL THERAPY 1/> REGIONS: CPT | Mod: GP | Performed by: PHYSICAL THERAPIST

## 2025-06-15 ENCOUNTER — HEALTH MAINTENANCE LETTER (OUTPATIENT)
Age: 75
End: 2025-06-15

## 2025-06-20 ENCOUNTER — MYC MEDICAL ADVICE (OUTPATIENT)
Dept: INTERNAL MEDICINE | Facility: CLINIC | Age: 75
End: 2025-06-20
Payer: COMMERCIAL

## 2025-06-20 DIAGNOSIS — T78.40XS ALLERGIC REACTION, SEQUELA: Primary | ICD-10-CM

## 2025-06-23 ENCOUNTER — PATIENT OUTREACH (OUTPATIENT)
Dept: CARE COORDINATION | Facility: CLINIC | Age: 75
End: 2025-06-23
Payer: COMMERCIAL

## 2025-08-01 ENCOUNTER — OFFICE VISIT (OUTPATIENT)
Dept: OTOLARYNGOLOGY | Facility: CLINIC | Age: 75
End: 2025-08-01
Attending: INTERNAL MEDICINE
Payer: COMMERCIAL

## 2025-08-01 ENCOUNTER — PRE VISIT (OUTPATIENT)
Dept: OTOLARYNGOLOGY | Facility: CLINIC | Age: 75
End: 2025-08-01

## 2025-08-01 VITALS
HEIGHT: 72 IN | WEIGHT: 164.9 LBS | DIASTOLIC BLOOD PRESSURE: 75 MMHG | SYSTOLIC BLOOD PRESSURE: 124 MMHG | HEART RATE: 64 BPM | BODY MASS INDEX: 22.34 KG/M2 | OXYGEN SATURATION: 98 %

## 2025-08-01 DIAGNOSIS — R09.81 CHRONIC NASAL CONGESTION: ICD-10-CM

## 2025-08-01 DIAGNOSIS — J32.0 CHRONIC MAXILLARY SINUSITIS: ICD-10-CM

## 2025-08-01 DIAGNOSIS — J34.2 DEVIATED NASAL SEPTUM: Primary | ICD-10-CM

## 2025-08-01 PROBLEM — R53.83 OTHER FATIGUE: Status: ACTIVE | Noted: 2025-08-01

## 2025-08-01 PROCEDURE — 31231 NASAL ENDOSCOPY DX: CPT | Performed by: STUDENT IN AN ORGANIZED HEALTH CARE EDUCATION/TRAINING PROGRAM

## 2025-08-01 PROCEDURE — 3078F DIAST BP <80 MM HG: CPT | Performed by: STUDENT IN AN ORGANIZED HEALTH CARE EDUCATION/TRAINING PROGRAM

## 2025-08-01 PROCEDURE — 1126F AMNT PAIN NOTED NONE PRSNT: CPT | Performed by: STUDENT IN AN ORGANIZED HEALTH CARE EDUCATION/TRAINING PROGRAM

## 2025-08-01 PROCEDURE — 3074F SYST BP LT 130 MM HG: CPT | Performed by: STUDENT IN AN ORGANIZED HEALTH CARE EDUCATION/TRAINING PROGRAM

## 2025-08-01 PROCEDURE — 99214 OFFICE O/P EST MOD 30 MIN: CPT | Mod: 25 | Performed by: STUDENT IN AN ORGANIZED HEALTH CARE EDUCATION/TRAINING PROGRAM

## 2025-08-01 ASSESSMENT — PAIN SCALES - GENERAL: PAINLEVEL_OUTOF10: NO PAIN (0)

## 2025-08-14 ENCOUNTER — TELEPHONE (OUTPATIENT)
Dept: OTOLARYNGOLOGY | Facility: CLINIC | Age: 75
End: 2025-08-14
Payer: COMMERCIAL

## 2025-08-15 ENCOUNTER — VIRTUAL VISIT (OUTPATIENT)
Dept: OTOLARYNGOLOGY | Facility: CLINIC | Age: 75
End: 2025-08-15
Payer: COMMERCIAL

## 2025-08-15 DIAGNOSIS — J32.0 CHRONIC MAXILLARY SINUSITIS: Primary | ICD-10-CM

## 2025-08-15 DIAGNOSIS — R09.81 CHRONIC NASAL CONGESTION: ICD-10-CM

## 2025-08-16 ENCOUNTER — PREP FOR PROCEDURE (OUTPATIENT)
Dept: OTOLARYNGOLOGY | Facility: CLINIC | Age: 75
End: 2025-08-16
Payer: COMMERCIAL

## 2025-08-16 DIAGNOSIS — J34.3 HYPERTROPHY OF INFERIOR NASAL TURBINATE: ICD-10-CM

## 2025-08-16 DIAGNOSIS — R09.81 CHRONIC NASAL CONGESTION: ICD-10-CM

## 2025-08-16 DIAGNOSIS — J32.0 CHRONIC MAXILLARY SINUSITIS: Primary | ICD-10-CM

## 2025-08-16 RX ORDER — CEFAZOLIN SODIUM 2 G/50ML
2 SOLUTION INTRAVENOUS
OUTPATIENT
Start: 2025-08-16

## 2025-08-16 RX ORDER — CEFAZOLIN SODIUM 2 G/50ML
2 SOLUTION INTRAVENOUS SEE ADMIN INSTRUCTIONS
OUTPATIENT
Start: 2025-08-16

## 2025-08-16 RX ORDER — DEXAMETHASONE SODIUM PHOSPHATE 4 MG/ML
10 INJECTION, SOLUTION INTRA-ARTICULAR; INTRALESIONAL; INTRAMUSCULAR; INTRAVENOUS; SOFT TISSUE ONCE
OUTPATIENT
Start: 2025-08-16 | End: 2025-08-16

## 2025-08-18 ENCOUNTER — TELEPHONE (OUTPATIENT)
Dept: OTOLARYNGOLOGY | Facility: CLINIC | Age: 75
End: 2025-08-18
Payer: COMMERCIAL

## 2025-08-18 PROBLEM — R09.81 CHRONIC NASAL CONGESTION: Status: ACTIVE | Noted: 2025-08-16

## 2025-08-18 PROBLEM — J32.0 CHRONIC MAXILLARY SINUSITIS: Status: ACTIVE | Noted: 2025-08-16

## 2025-08-18 PROBLEM — J34.3 HYPERTROPHY OF INFERIOR NASAL TURBINATE: Status: ACTIVE | Noted: 2025-08-16

## 2025-08-19 ENCOUNTER — TELEPHONE (OUTPATIENT)
Dept: OTOLARYNGOLOGY | Facility: CLINIC | Age: 75
End: 2025-08-19
Payer: COMMERCIAL

## 2025-08-28 ENCOUNTER — TELEPHONE (OUTPATIENT)
Dept: OTOLARYNGOLOGY | Facility: CLINIC | Age: 75
End: 2025-08-28
Payer: COMMERCIAL

## 2025-09-03 ENCOUNTER — MYC MEDICAL ADVICE (OUTPATIENT)
Dept: INTERNAL MEDICINE | Facility: CLINIC | Age: 75
End: 2025-09-03
Payer: COMMERCIAL

## (undated) DEVICE — KIT ENDO TURNOVER/PROCEDURE CARRY-ON 101822

## (undated) DEVICE — GOWN IMPERVIOUS 2XL BLUE

## (undated) DEVICE — SUCTION MANIFOLD NEPTUNE 2 SYS 1 PORT 702-025-000

## (undated) DEVICE — ENDO SNARE EXACTO COLD 9MM LOOP 2.4MMX230CM 00711115

## (undated) DEVICE — ENDO FORCEP BX CAPTURA PRO SPIKE G50696

## (undated) DEVICE — SNARE CAPIVATOR ROUND COLD SNR BX10 M00561101

## (undated) DEVICE — SPECIMEN CONTAINER 3OZ W/FORMALIN 59901

## (undated) DEVICE — TUBING SUCTION MEDI-VAC 1/4"X20' N620A

## (undated) DEVICE — SOL WATER IRRIG 500ML BOTTLE 2F7113

## (undated) DEVICE — ENDO TRAP POLYP E-TRAP 00711099

## (undated) RX ORDER — LIDOCAINE HYDROCHLORIDE 10 MG/ML
INJECTION, SOLUTION EPIDURAL; INFILTRATION; INTRACAUDAL; PERINEURAL
Status: DISPENSED
Start: 2018-05-14

## (undated) RX ORDER — TRIAMCINOLONE ACETONIDE 40 MG/ML
INJECTION, SUSPENSION INTRA-ARTICULAR; INTRAMUSCULAR
Status: DISPENSED
Start: 2018-05-14

## (undated) RX ORDER — FENTANYL CITRATE 50 UG/ML
INJECTION, SOLUTION INTRAMUSCULAR; INTRAVENOUS
Status: DISPENSED
Start: 2018-05-25

## (undated) RX ORDER — DIPHENHYDRAMINE HYDROCHLORIDE 50 MG/ML
INJECTION INTRAMUSCULAR; INTRAVENOUS
Status: DISPENSED
Start: 2018-05-25